# Patient Record
Sex: FEMALE | Race: WHITE | Employment: FULL TIME | URBAN - METROPOLITAN AREA
[De-identification: names, ages, dates, MRNs, and addresses within clinical notes are randomized per-mention and may not be internally consistent; named-entity substitution may affect disease eponyms.]

---

## 2017-04-06 ENCOUNTER — HOSPITAL ENCOUNTER (OUTPATIENT)
Age: 42
Discharge: HOME | End: 2017-04-06
Attending: FAMILY MEDICINE
Payer: COMMERCIAL

## 2017-04-06 VITALS
DIASTOLIC BLOOD PRESSURE: 89 MMHG | RESPIRATION RATE: 20 BRPM | WEIGHT: 170 LBS | BODY MASS INDEX: 26 KG/M2 | OXYGEN SATURATION: 100 % | SYSTOLIC BLOOD PRESSURE: 127 MMHG | HEART RATE: 69 BPM | TEMPERATURE: 98 F

## 2017-04-06 DIAGNOSIS — H57.89 IRRITATION OF LEFT EYE: Primary | ICD-10-CM

## 2017-04-06 PROCEDURE — 99213 OFFICE O/P EST LOW 20 MIN: CPT

## 2017-04-06 PROCEDURE — 99204 OFFICE O/P NEW MOD 45 MIN: CPT

## 2017-04-06 RX ORDER — TOBRAMYCIN 3 MG/ML
1 SOLUTION/ DROPS OPHTHALMIC EVERY 6 HOURS
Qty: 1 BOTTLE | Refills: 0 | Status: SHIPPED | OUTPATIENT
Start: 2017-04-06 | End: 2017-04-13

## 2017-04-07 ENCOUNTER — TELEPHONE (OUTPATIENT)
Dept: INTERNAL MEDICINE CLINIC | Facility: CLINIC | Age: 42
End: 2017-04-07

## 2017-04-07 DIAGNOSIS — H57.89 IRRITATION OF LEFT EYE: Primary | ICD-10-CM

## 2017-04-07 NOTE — TELEPHONE ENCOUNTER
LOV 10/12/16 w/ SD  Referral pended for your approval if ok  Please advise. Thank you.      Rose Marie Fuentes [facility] Clinical Staff Cc: [facility] Referral Pool       Phone Number: [phone]                     Patient Name: Nico Garcia   LAYLA

## 2017-04-07 NOTE — ED PROVIDER NOTES
Patient Seen in: [address] [facility] [place]    History   Patient presents with: Eye Visual Problem (opthalmic)    Stated Complaint: FB in L. Eye    HPI    69-year-old female presents to clinic today with chief complaints of left eye irritation.   [place] Josette headache. May repeat in 2 hours as needed to a max of 2 tablets in 24 hours. Levothyroxine Sodium 150 MCG Oral Tab,  Take 1 tablet (150 mcg total) by mouth once daily.    Pimecrolimus (ELIDEL) 1 % External Cream,  Apply to AA of face BID   ibuprofen (MOT atraumatic.   Left  eye exam:   - Orbits and periorbital area normal : Yes  - PERRL+ : Yes  - EOMI+: Yes  - Cornea : clear  - Conjunctiva: palpebral and bulbar conjunctival injection.   - Perilimbal injection: Yes  - Eyelids normal : Yes  - FB : No  - Photo

## 2017-06-23 NOTE — PROGRESS NOTES
Dallas Tapia is a 37year old female. Patient presents with: Anxiety: has been having alot of anxiety and would  like help for it       HPI:   Presents for eval of anxiety. Her father with cancer and is ill. Children are busy with sports.   Zan Russo MG Oral Tab Take 400 mg by mouth every 6 (six) hours as needed for Pain. Disp:  Rfl:    Multiple Vitamins-Minerals (MULTIVITAMIN & MINERAL OR) Take  by mouth daily. Disp:  Rfl:    Aspirin (ASPIR-81) 81 MG Oral Tab EC Take 81 mg by mouth daily.  Disp:  Rfl: depression  (primary encounter diagnosis)  Start wellbutrin xl. She will email in a few weeks with update. If needed, will add low dose sertraline 25mg. Reviewed medication including use, side effects and follow up.   The options of treatment were discus

## 2017-07-03 ENCOUNTER — APPOINTMENT (OUTPATIENT)
Dept: LAB | Age: 42
End: 2017-07-03
Attending: NURSE PRACTITIONER
Payer: COMMERCIAL

## 2017-07-03 ENCOUNTER — NURSE ONLY (OUTPATIENT)
Dept: INTERNAL MEDICINE CLINIC | Facility: CLINIC | Age: 42
End: 2017-07-03

## 2017-07-03 DIAGNOSIS — Z01.84 IMMUNITY STATUS TESTING: ICD-10-CM

## 2017-07-03 DIAGNOSIS — Z01.84 IMMUNITY STATUS TESTING: Primary | ICD-10-CM

## 2017-07-03 LAB
RUBELLA IGG QUANTITATIVE: 173.8 IU/ML
RUBV IGG SER QL: POSITIVE

## 2017-07-03 PROCEDURE — 90471 IMMUNIZATION ADMIN: CPT | Performed by: NURSE PRACTITIONER

## 2017-07-03 PROCEDURE — 86735 MUMPS ANTIBODY: CPT | Performed by: NURSE PRACTITIONER

## 2017-07-03 PROCEDURE — 90715 TDAP VACCINE 7 YRS/> IM: CPT | Performed by: NURSE PRACTITIONER

## 2017-07-03 PROCEDURE — 86762 RUBELLA ANTIBODY: CPT | Performed by: NURSE PRACTITIONER

## 2017-07-03 PROCEDURE — 86765 RUBEOLA ANTIBODY: CPT | Performed by: NURSE PRACTITIONER

## 2017-07-06 LAB
MEV IGG SER IA-ACNC: POSITIVE
MUV IGG SER IA-ACNC: POSITIVE

## 2017-07-31 RX ORDER — SUMATRIPTAN 100 MG/1
TABLET, FILM COATED ORAL
Qty: 10 TABLET | Refills: 3 | Status: SHIPPED | OUTPATIENT
Start: 2017-07-31 | End: 2018-04-02

## 2017-08-23 ENCOUNTER — HOSPITAL ENCOUNTER (OUTPATIENT)
Dept: CT IMAGING | Facility: HOSPITAL | Age: 42
Discharge: HOME | End: 2017-08-23
Attending: INTERNAL MEDICINE

## 2017-08-23 DIAGNOSIS — Z13.6 SCREENING FOR HEART DISEASE: ICD-10-CM

## 2017-10-19 RX ORDER — LEVOTHYROXINE SODIUM 0.15 MG/1
150 TABLET ORAL
Qty: 90 TABLET | Refills: 0 | Status: SHIPPED | OUTPATIENT
Start: 2017-10-19 | End: 2018-01-21

## 2017-11-21 ENCOUNTER — TELEPHONE (OUTPATIENT)
Dept: INTERNAL MEDICINE CLINIC | Facility: CLINIC | Age: 42
End: 2017-11-21

## 2017-11-21 DIAGNOSIS — Z00.00 LABORATORY EXAMINATION ORDERED AS PART OF A COMPLETE PHYSICAL EXAMINATION: Primary | ICD-10-CM

## 2017-11-21 NOTE — TELEPHONE ENCOUNTER
Patient is having a physical.  Please place orders for [facility]. Patient will be fasting.   Future Appointments  Date Time Provider Melissa Ortiz   12/11/2017 9:10 AM Joshua Alaniz MD [address]   12/12/2017 9:15 AM WAN Caldwell [facility] 7

## 2017-12-12 ENCOUNTER — LAB ENCOUNTER (OUTPATIENT)
Dept: LAB | Age: 42
End: 2017-12-12
Attending: NURSE PRACTITIONER
Payer: COMMERCIAL

## 2017-12-12 ENCOUNTER — OFFICE VISIT (OUTPATIENT)
Dept: INTERNAL MEDICINE CLINIC | Facility: CLINIC | Age: 42
End: 2017-12-12

## 2017-12-12 VITALS
DIASTOLIC BLOOD PRESSURE: 62 MMHG | HEIGHT: 68 IN | RESPIRATION RATE: 16 BRPM | SYSTOLIC BLOOD PRESSURE: 110 MMHG | BODY MASS INDEX: 26.22 KG/M2 | WEIGHT: 173 LBS | HEART RATE: 60 BPM | TEMPERATURE: 98 F

## 2017-12-12 DIAGNOSIS — G43.009 MIGRAINE WITHOUT AURA AND WITHOUT STATUS MIGRAINOSUS, NOT INTRACTABLE: ICD-10-CM

## 2017-12-12 DIAGNOSIS — R93.1 ABNORMAL CT SCAN OF HEART: ICD-10-CM

## 2017-12-12 DIAGNOSIS — Z12.31 ENCOUNTER FOR SCREENING MAMMOGRAM FOR MALIGNANT NEOPLASM OF BREAST: ICD-10-CM

## 2017-12-12 DIAGNOSIS — Z00.00 LABORATORY EXAMINATION ORDERED AS PART OF A COMPLETE PHYSICAL EXAMINATION: ICD-10-CM

## 2017-12-12 DIAGNOSIS — Z00.00 ROUTINE GENERAL MEDICAL EXAMINATION AT A HEALTH CARE FACILITY: Primary | ICD-10-CM

## 2017-12-12 DIAGNOSIS — F41.1 ANXIETY STATE: ICD-10-CM

## 2017-12-12 DIAGNOSIS — Z82.49 FAMILY HISTORY OF EARLY CAD: ICD-10-CM

## 2017-12-12 DIAGNOSIS — M54.50 LOW BACK PAIN WITH RADIATION: ICD-10-CM

## 2017-12-12 DIAGNOSIS — E78.5 DYSLIPIDEMIA: Primary | ICD-10-CM

## 2017-12-12 DIAGNOSIS — E03.9 ACQUIRED HYPOTHYROIDISM: ICD-10-CM

## 2017-12-12 PROCEDURE — 84443 ASSAY THYROID STIM HORMONE: CPT

## 2017-12-12 PROCEDURE — 80061 LIPID PANEL: CPT

## 2017-12-12 PROCEDURE — 99396 PREV VISIT EST AGE 40-64: CPT | Performed by: NURSE PRACTITIONER

## 2017-12-12 PROCEDURE — 80053 COMPREHEN METABOLIC PANEL: CPT

## 2017-12-12 PROCEDURE — 85025 COMPLETE CBC W/AUTO DIFF WBC: CPT

## 2017-12-12 RX ORDER — METHYLPREDNISOLONE 4 MG/1
TABLET ORAL
Refills: 0 | COMMUNITY
Start: 2017-12-09 | End: 2019-01-18 | Stop reason: ALTCHOICE

## 2017-12-12 RX ORDER — ROSUVASTATIN CALCIUM 5 MG/1
5 TABLET, COATED ORAL NIGHTLY
Qty: 90 TABLET | Refills: 1 | Status: SHIPPED | OUTPATIENT
Start: 2017-12-12 | End: 2018-06-08

## 2017-12-12 NOTE — PROGRESS NOTES
Melvina Hollins is a 40year old female who presents for a complete physical exam:       Patient complains of:    Back pain  Started last Wednesday. Sudden onset right low back/buttocks pain with radiation to anterior thigh.   Worsening pain prompted WI 100 MG Oral Tab TAKE 1 TAB BY MOUTH AT ONSET OF HEADACHE, REPEAT DOSE IN 2 HRS AS NEEDED TO A MAX OF 2 TABS IN 24HRS Disp: 10 tablet Rfl: 3   BuPROPion HCl ER, XL, 150 MG Oral Tablet 24 Hr Take 1 tablet (150 mg total) by mouth daily.  Disp: 90 tablet Rfl: 1 status: Never Smoker                                                              Smokeless tobacco: Never Used                      Alcohol use: No              Social History: Occ: . : . Children:   Exercise: three times per week.   Diet: watches f suspicious lesions  HEENT: atraumatic, normocephalic,ears and throat are clear  EYES:PERRLA, EOMI, conjunctiva are clear  NECK: supple,no adenopathy,no bruits  CHEST: no chest tenderness  BREAST: no dominant or suspicious mass  LUNGS: clear to auscultation

## 2017-12-13 RX ORDER — BUPROPION HYDROCHLORIDE 150 MG/1
150 TABLET ORAL DAILY
Qty: 90 TABLET | Refills: 1 | Status: SHIPPED | OUTPATIENT
Start: 2017-12-13 | End: 2018-06-08

## 2017-12-13 NOTE — TELEPHONE ENCOUNTER
LOV:12/13/2017   Upcoming Visit: None   Last Labs: 12/13/2017 lipid, cmp, tsh , cbc   Last refill: 6/23/2017 , 90 tabs .  0 refills     Per protocol route to provider

## 2018-01-22 RX ORDER — LEVOTHYROXINE SODIUM 0.15 MG/1
150 TABLET ORAL
Qty: 90 TABLET | Refills: 3 | Status: SHIPPED | OUTPATIENT
Start: 2018-01-22 | End: 2019-01-11

## 2018-04-02 RX ORDER — SUMATRIPTAN 100 MG/1
TABLET, FILM COATED ORAL
Qty: 10 TABLET | Refills: 3 | Status: SHIPPED | OUTPATIENT
Start: 2018-04-02 | End: 2019-02-14

## 2018-04-02 NOTE — TELEPHONE ENCOUNTER
Medication(s) to Refill:   Pending Prescriptions Disp Refills    SUMATRIPTAN SUCCINATE 100 MG Oral Tab [Pharmacy Med Name: SUMATRIPTAN SUCC 100 MG TABLET] 10 tablet 3     Sig: TAKE 1 TAB BY MOUTH AT ONSET OF HEADACHE, REPEAT DOSE IN 2 HRS AS NEEDED TO JOSÉ MIGUEL CHRISTIAN

## 2018-04-09 ENCOUNTER — HOSPITAL ENCOUNTER (OUTPATIENT)
Dept: MAMMOGRAPHY | Age: 43
Discharge: HOME | End: 2018-04-09
Attending: NURSE PRACTITIONER
Payer: COMMERCIAL

## 2018-04-09 DIAGNOSIS — Z12.31 ENCOUNTER FOR SCREENING MAMMOGRAM FOR MALIGNANT NEOPLASM OF BREAST: ICD-10-CM

## 2018-04-09 PROCEDURE — 77067 SCR MAMMO BI INCL CAD: CPT | Performed by: NURSE PRACTITIONER

## 2018-04-10 ENCOUNTER — APPOINTMENT (OUTPATIENT)
Dept: LAB | Age: 43
End: 2018-04-10
Attending: NURSE PRACTITIONER
Payer: COMMERCIAL

## 2018-04-10 DIAGNOSIS — E78.5 DYSLIPIDEMIA: ICD-10-CM

## 2018-04-10 PROCEDURE — 36415 COLL VENOUS BLD VENIPUNCTURE: CPT

## 2018-04-10 PROCEDURE — 80061 LIPID PANEL: CPT

## 2018-04-10 PROCEDURE — 80053 COMPREHEN METABOLIC PANEL: CPT

## 2018-06-08 ENCOUNTER — TELEPHONE (OUTPATIENT)
Dept: INTERNAL MEDICINE CLINIC | Facility: CLINIC | Age: 43
End: 2018-06-08

## 2018-06-08 RX ORDER — BUPROPION HYDROCHLORIDE 150 MG/1
150 TABLET ORAL DAILY
Qty: 90 TABLET | Refills: 0 | Status: SHIPPED | OUTPATIENT
Start: 2018-06-08 | End: 2019-01-18

## 2018-06-08 RX ORDER — ROSUVASTATIN CALCIUM 5 MG/1
5 TABLET, COATED ORAL NIGHTLY
Qty: 90 TABLET | Refills: 0 | Status: SHIPPED | OUTPATIENT
Start: 2018-06-08 | End: 2019-01-18

## 2018-06-08 NOTE — TELEPHONE ENCOUNTER
LMTCB re: info listed below but pt had a CPE 12/12/17. Is she due for something else other than a CPE?

## 2018-06-08 NOTE — TELEPHONE ENCOUNTER
LOV: 12/12/2017  Future Visit: No appointment scheduled   Last Labs: 04/10/2018 COMP, Lipid panel   Last Rx: 12/13/2017 Bupropion HCL, Rosuvastatin     Per protocol: sent to provider

## 2018-07-23 RX ORDER — BUPROPION HYDROCHLORIDE 150 MG/1
150 TABLET ORAL DAILY
Qty: 90 TABLET | Refills: 1 | Status: SHIPPED | OUTPATIENT
Start: 2018-07-23 | End: 2019-01-18

## 2018-07-23 RX ORDER — ROSUVASTATIN CALCIUM 5 MG/1
5 TABLET, COATED ORAL NIGHTLY
Qty: 90 TABLET | Refills: 1 | Status: SHIPPED | OUTPATIENT
Start: 2018-07-23 | End: 2019-01-18

## 2018-07-23 NOTE — TELEPHONE ENCOUNTER
LOV:12/12/17 SD  FOV: none on file   LAST RX: 6/8/18 Bupropion Hcl Er: 150 mg 1 tablet by mouth daily . 90 tabs 0 refills. 6/8/18 Rosuvastatin calcuim 5 mg 1 tab nightly 90 tabs 0 refills.    LAST LABS:4/10/18 cmp,lipid  PER PROTOCOL: to provider

## 2018-09-04 RX ORDER — ROSUVASTATIN CALCIUM 5 MG/1
TABLET, COATED ORAL
Qty: 90 TABLET | Refills: 0 | OUTPATIENT
Start: 2018-09-04

## 2018-09-04 RX ORDER — BUPROPION HYDROCHLORIDE 150 MG/1
TABLET ORAL
Qty: 90 TABLET | Refills: 0 | OUTPATIENT
Start: 2018-09-04

## 2019-01-02 ENCOUNTER — TELEPHONE (OUTPATIENT)
Dept: INTERNAL MEDICINE CLINIC | Facility: CLINIC | Age: 44
End: 2019-01-02

## 2019-01-02 DIAGNOSIS — Z13.0 SCREENING FOR DISORDER OF BLOOD AND BLOOD-FORMING ORGANS: ICD-10-CM

## 2019-01-02 DIAGNOSIS — Z13.220 SCREENING FOR LIPID DISORDERS: ICD-10-CM

## 2019-01-02 DIAGNOSIS — Z13.228 SCREENING FOR METABOLIC DISORDER: ICD-10-CM

## 2019-01-02 DIAGNOSIS — Z13.29 SCREENING FOR THYROID DISORDER: ICD-10-CM

## 2019-01-02 DIAGNOSIS — Z00.00 ROUTINE GENERAL MEDICAL EXAMINATION AT A HEALTH CARE FACILITY: Primary | ICD-10-CM

## 2019-01-02 NOTE — TELEPHONE ENCOUNTER
Future Appointments   Date Time Provider Melissa Perezi   1/18/2019  9:15 AM JÚNIOR Eaton [facility]   6/12/2019  9:00 AM Hong Moseley MD [address]     Orders to edward- Pt aware to fast-no call back required    Pt h

## 2019-01-11 ENCOUNTER — LAB ENCOUNTER (OUTPATIENT)
Dept: LAB | Age: 44
End: 2019-01-11
Attending: NURSE PRACTITIONER
Payer: COMMERCIAL

## 2019-01-11 DIAGNOSIS — Z00.00 ROUTINE GENERAL MEDICAL EXAMINATION AT A HEALTH CARE FACILITY: ICD-10-CM

## 2019-01-11 DIAGNOSIS — Z13.220 SCREENING FOR LIPID DISORDERS: ICD-10-CM

## 2019-01-11 DIAGNOSIS — E03.9 ACQUIRED HYPOTHYROIDISM: Primary | ICD-10-CM

## 2019-01-11 DIAGNOSIS — Z13.0 SCREENING FOR DISORDER OF BLOOD AND BLOOD-FORMING ORGANS: ICD-10-CM

## 2019-01-11 DIAGNOSIS — Z13.29 SCREENING FOR THYROID DISORDER: ICD-10-CM

## 2019-01-11 DIAGNOSIS — Z13.228 SCREENING FOR METABOLIC DISORDER: ICD-10-CM

## 2019-01-11 LAB
ALBUMIN SERPL-MCNC: 3.7 G/DL (ref 3.1–4.5)
ALBUMIN/GLOB SERPL: 1.1 {RATIO} (ref 1–2)
ALP LIVER SERPL-CCNC: 81 U/L (ref 37–98)
ALT SERPL-CCNC: 34 U/L (ref 14–54)
ANION GAP SERPL CALC-SCNC: 3 MMOL/L (ref 0–18)
AST SERPL-CCNC: 23 U/L (ref 15–41)
BASOPHILS # BLD AUTO: 0.05 X10(3) UL (ref 0–0.1)
BASOPHILS NFR BLD AUTO: 0.8 %
BILIRUB SERPL-MCNC: 0.4 MG/DL (ref 0.1–2)
BUN BLD-MCNC: 11 MG/DL (ref 8–20)
BUN/CREAT SERPL: 14.7 (ref 10–20)
CALCIUM BLD-MCNC: 9.2 MG/DL (ref 8.3–10.3)
CHLORIDE SERPL-SCNC: 105 MMOL/L (ref 101–111)
CHOLEST SMN-MCNC: 120 MG/DL (ref ?–200)
CO2 SERPL-SCNC: 30 MMOL/L (ref 22–32)
CREAT BLD-MCNC: 0.75 MG/DL (ref 0.55–1.02)
EOSINOPHIL # BLD AUTO: 0.18 X10(3) UL (ref 0–0.3)
EOSINOPHIL NFR BLD AUTO: 2.9 %
ERYTHROCYTE [DISTWIDTH] IN BLOOD BY AUTOMATED COUNT: 11.6 % (ref 11.5–16)
GLOBULIN PLAS-MCNC: 3.4 G/DL (ref 2.8–4.4)
GLUCOSE BLD-MCNC: 88 MG/DL (ref 70–99)
HCT VFR BLD AUTO: 39.4 % (ref 34–50)
HDLC SERPL-MCNC: 62 MG/DL (ref 40–59)
HGB BLD-MCNC: 13.5 G/DL (ref 12–16)
IMMATURE GRANULOCYTE COUNT: 0.02 X10(3) UL (ref 0–1)
IMMATURE GRANULOCYTE RATIO %: 0.3 %
LDLC SERPL CALC-MCNC: 46 MG/DL (ref ?–100)
LYMPHOCYTES # BLD AUTO: 2.41 X10(3) UL (ref 0.9–4)
LYMPHOCYTES NFR BLD AUTO: 39.4 %
M PROTEIN MFR SERPL ELPH: 7.1 G/DL (ref 6.4–8.2)
MCH RBC QN AUTO: 31.9 PG (ref 27–33.2)
MCHC RBC AUTO-ENTMCNC: 34.3 G/DL (ref 31–37)
MCV RBC AUTO: 93.1 FL (ref 81–100)
MONOCYTES # BLD AUTO: 0.58 X10(3) UL (ref 0.1–1)
MONOCYTES NFR BLD AUTO: 9.5 %
NEUTROPHIL ABS PRELIM: 2.87 X10 (3) UL (ref 1.3–6.7)
NEUTROPHILS # BLD AUTO: 2.87 X10(3) UL (ref 1.3–6.7)
NEUTROPHILS NFR BLD AUTO: 47.1 %
NONHDLC SERPL-MCNC: 58 MG/DL (ref ?–130)
OSMOLALITY SERPL CALC.SUM OF ELEC: 285 MOSM/KG (ref 275–295)
PLATELET # BLD AUTO: 284 10(3)UL (ref 150–450)
POTASSIUM SERPL-SCNC: 4.1 MMOL/L (ref 3.6–5.1)
RBC # BLD AUTO: 4.23 X10(6)UL (ref 3.8–5.1)
RED CELL DISTRIBUTION WIDTH-SD: 39.5 FL (ref 35.1–46.3)
SODIUM SERPL-SCNC: 138 MMOL/L (ref 136–144)
T4 FREE SERPL-MCNC: 1.5 NG/DL (ref 0.9–1.8)
TRIGL SERPL-MCNC: 60 MG/DL (ref 30–149)
TSI SER-ACNC: 0.06 MIU/ML (ref 0.35–5.5)
VLDLC SERPL CALC-MCNC: 12 MG/DL (ref 0–30)
WBC # BLD AUTO: 6.1 X10(3) UL (ref 4–13)

## 2019-01-11 PROCEDURE — 80053 COMPREHEN METABOLIC PANEL: CPT

## 2019-01-11 PROCEDURE — 80061 LIPID PANEL: CPT

## 2019-01-11 PROCEDURE — 84439 ASSAY OF FREE THYROXINE: CPT

## 2019-01-11 PROCEDURE — 36415 COLL VENOUS BLD VENIPUNCTURE: CPT

## 2019-01-11 PROCEDURE — 84443 ASSAY THYROID STIM HORMONE: CPT

## 2019-01-11 PROCEDURE — 85025 COMPLETE CBC W/AUTO DIFF WBC: CPT

## 2019-01-11 RX ORDER — LEVOTHYROXINE SODIUM 137 UG/1
150 TABLET ORAL
Qty: 90 TABLET | Refills: 1 | Status: SHIPPED | OUTPATIENT
Start: 2019-01-11 | End: 2019-01-16

## 2019-01-16 ENCOUNTER — TELEPHONE (OUTPATIENT)
Dept: INTERNAL MEDICINE CLINIC | Facility: CLINIC | Age: 44
End: 2019-01-16

## 2019-01-16 RX ORDER — LEVOTHYROXINE SODIUM 137 UG/1
137 TABLET ORAL
Qty: 90 TABLET | Refills: 1 | Status: SHIPPED | OUTPATIENT
Start: 2019-01-16 | End: 2019-07-13

## 2019-01-16 NOTE — TELEPHONE ENCOUNTER
Calling to get clarification on directions for Levothyroxine Sodium 137 MCG Oral Tab. It says to take 150mcg?  Please advise

## 2019-01-18 ENCOUNTER — OFFICE VISIT (OUTPATIENT)
Dept: INTERNAL MEDICINE CLINIC | Facility: CLINIC | Age: 44
End: 2019-01-18

## 2019-01-18 VITALS
HEART RATE: 72 BPM | SYSTOLIC BLOOD PRESSURE: 114 MMHG | BODY MASS INDEX: 25.61 KG/M2 | DIASTOLIC BLOOD PRESSURE: 70 MMHG | HEIGHT: 68 IN | TEMPERATURE: 98 F | WEIGHT: 169 LBS

## 2019-01-18 DIAGNOSIS — G43.009 MIGRAINE WITHOUT AURA AND WITHOUT STATUS MIGRAINOSUS, NOT INTRACTABLE: ICD-10-CM

## 2019-01-18 DIAGNOSIS — Z11.51 SCREENING FOR HPV (HUMAN PAPILLOMAVIRUS): ICD-10-CM

## 2019-01-18 DIAGNOSIS — Z12.31 ENCOUNTER FOR SCREENING MAMMOGRAM FOR MALIGNANT NEOPLASM OF BREAST: ICD-10-CM

## 2019-01-18 DIAGNOSIS — F41.1 ANXIETY STATE: ICD-10-CM

## 2019-01-18 DIAGNOSIS — E03.9 ACQUIRED HYPOTHYROIDISM: ICD-10-CM

## 2019-01-18 DIAGNOSIS — L70.0 ACNE VULGARIS: ICD-10-CM

## 2019-01-18 DIAGNOSIS — Z00.00 ROUTINE GENERAL MEDICAL EXAMINATION AT A HEALTH CARE FACILITY: Primary | ICD-10-CM

## 2019-01-18 DIAGNOSIS — Z12.4 SCREENING FOR MALIGNANT NEOPLASM OF CERVIX: ICD-10-CM

## 2019-01-18 PROCEDURE — 99396 PREV VISIT EST AGE 40-64: CPT | Performed by: NURSE PRACTITIONER

## 2019-01-18 PROCEDURE — 87624 HPV HI-RISK TYP POOLED RSLT: CPT | Performed by: NURSE PRACTITIONER

## 2019-01-18 PROCEDURE — 88175 CYTOPATH C/V AUTO FLUID REDO: CPT | Performed by: NURSE PRACTITIONER

## 2019-01-18 RX ORDER — ROSUVASTATIN CALCIUM 5 MG/1
5 TABLET, COATED ORAL NIGHTLY
Qty: 90 TABLET | Refills: 3 | Status: SHIPPED | OUTPATIENT
Start: 2019-01-18

## 2019-01-18 RX ORDER — BUPROPION HYDROCHLORIDE 150 MG/1
150 TABLET ORAL DAILY
Qty: 90 TABLET | Refills: 3 | Status: SHIPPED | OUTPATIENT
Start: 2019-01-18 | End: 2020-02-19

## 2019-01-18 NOTE — PROGRESS NOTES
Amber Hernandez is a 44year old female who presents for a complete physical exam:       Patient complains of:     Anxiety   Well controlled with wellbutrin xl 150mg. Hypothyroid  Over replaced with 150mcg   decresaed to 137mcg with labs 3 months. ONSET OF HEADACHE, REPEAT DOSE IN 2 HRS AS NEEDED TO A MAX OF 2 TABS IN 24HRS Disp: 10 tablet Rfl: 3   ibuprofen (MOTRIN) 400 MG Oral Tab Take 400 mg by mouth every 6 (six) hours as needed for Pain.  Disp:  Rfl:    Multiple Vitamins-Minerals (MULTIVITAMIN & Active:  Yes   Breast Cancer Screening:  Mammogram due on 04/09/2019     Bone Density:  na    Osteoperosis Prevention:    Osteoperosis Prevention was discussed. Reviewed Calcium, Vitamin D supplementation and Weight Bearing Exercises.     Patient is perfor facility  (primary encounter diagnosis)  Screening for malignant neoplasm of cervix  Screening for hpv (human papillomavirus)  Encounter for screening mammogram for malignant neoplasm of breast  Acne vulgaris  Per derm  Anxiety state  Well controlled with

## 2019-01-20 LAB — HPV I/H RISK 1 DNA SPEC QL NAA+PROBE: NEGATIVE

## 2019-01-22 LAB — LAST PAP RESULT: NORMAL

## 2019-02-14 RX ORDER — SUMATRIPTAN 100 MG/1
TABLET, FILM COATED ORAL
Qty: 10 TABLET | Refills: 3 | Status: SHIPPED | OUTPATIENT
Start: 2019-02-14 | End: 2019-09-10

## 2019-02-14 NOTE — TELEPHONE ENCOUNTER
LOV:1/18/19 SD  FOV:none on file   LAST RX:4/2/18 100 mg take 1 tab as needed for headaches 10 tabs 3 refills   LAST LABS:1/11/19 t4 free,cbc,cmp,lipids,tsh   PER PROTOCOL: to provider

## 2019-03-03 ENCOUNTER — HOSPITAL ENCOUNTER (OUTPATIENT)
Age: 44
Discharge: HOME | End: 2019-03-03
Attending: EMERGENCY MEDICINE
Payer: COMMERCIAL

## 2019-03-03 VITALS
HEART RATE: 61 BPM | BODY MASS INDEX: 25.01 KG/M2 | RESPIRATION RATE: 16 BRPM | WEIGHT: 165 LBS | SYSTOLIC BLOOD PRESSURE: 128 MMHG | HEIGHT: 68 IN | OXYGEN SATURATION: 98 % | DIASTOLIC BLOOD PRESSURE: 82 MMHG

## 2019-03-03 DIAGNOSIS — J02.0 STREP PHARYNGITIS: Primary | ICD-10-CM

## 2019-03-03 LAB — POCT RAPID STREP: POSITIVE

## 2019-03-03 PROCEDURE — 87430 STREP A AG IA: CPT | Performed by: EMERGENCY MEDICINE

## 2019-03-03 PROCEDURE — 99213 OFFICE O/P EST LOW 20 MIN: CPT

## 2019-03-03 PROCEDURE — 99214 OFFICE O/P EST MOD 30 MIN: CPT

## 2019-03-03 RX ORDER — AMOXICILLIN 500 MG/1
500 TABLET, FILM COATED ORAL 2 TIMES DAILY
Qty: 30 TABLET | Refills: 0 | Status: SHIPPED | OUTPATIENT
Start: 2019-03-03 | End: 2019-03-13

## 2019-03-03 NOTE — ED PROVIDER NOTES
Patient Seen in: [facility]    History   Patient presents with:  Sore Throat    Stated Complaint: possible strep x3 days     HPI    Patient is a 79-year-old female who presents for evaluation of sore throat, fevers and chil Couple small exudates on the left. No asymmetry concerning for abscess. Eyes: Conjunctivae and EOM are normal. Pupils are equal, round, and reactive to light. Neck: Normal range of motion. Neck supple.    Cardiovascular: Normal rate, regular rhythm and

## 2019-03-15 ENCOUNTER — APPOINTMENT (OUTPATIENT)
Dept: LAB | Age: 44
End: 2019-03-15
Attending: NURSE PRACTITIONER
Payer: COMMERCIAL

## 2019-03-15 DIAGNOSIS — E03.9 ACQUIRED HYPOTHYROIDISM: ICD-10-CM

## 2019-03-15 LAB
T4 FREE SERPL-MCNC: 1.3 NG/DL (ref 0.8–1.7)
TSI SER-ACNC: 1.43 MIU/ML (ref 0.36–3.74)

## 2019-03-15 PROCEDURE — 84439 ASSAY OF FREE THYROXINE: CPT

## 2019-03-15 PROCEDURE — 84443 ASSAY THYROID STIM HORMONE: CPT

## 2019-03-15 PROCEDURE — 36415 COLL VENOUS BLD VENIPUNCTURE: CPT

## 2019-06-13 NOTE — PROGRESS NOTES
Elisa De La Cruz is a 44year old female. Patient presents with: Anxiety: LG. Room 11. Talk about anxiety and depression       HPI:   Here to further discuss her anxiety and depression.   Struggling with some life events she just cant seem to \"get over External Lotion Apply topically to face every morning. Disp: 118 mL Rfl: 2   Multiple Vitamins-Minerals (MULTIVITAMIN & MINERAL OR) Take  by mouth daily. Disp:  Rfl:    Aspirin (ASPIR-81) 81 MG Oral Tab EC Take 81 mg by mouth daily.  Disp:  Rfl:    Folic Ac (primary encounter diagnosis)  Cont wellbutrin xl 150mg   Add sertraline 50mg. Start 25mg and increase to 50mg if tolerating. See me in 4-6 weeks   She will see me end of summer if she is doing well due to her young boys out of school.       No orders of

## 2019-07-13 RX ORDER — LEVOTHYROXINE SODIUM 137 UG/1
TABLET ORAL
Qty: 90 TABLET | Refills: 1 | Status: SHIPPED | OUTPATIENT
Start: 2019-07-13 | End: 2020-01-13

## 2019-08-22 ENCOUNTER — HOSPITAL ENCOUNTER (OUTPATIENT)
Dept: MAMMOGRAPHY | Age: 44
Discharge: HOME | End: 2019-08-22
Attending: NURSE PRACTITIONER
Payer: COMMERCIAL

## 2019-08-22 DIAGNOSIS — Z12.31 ENCOUNTER FOR SCREENING MAMMOGRAM FOR MALIGNANT NEOPLASM OF BREAST: ICD-10-CM

## 2019-08-22 PROCEDURE — 77067 SCR MAMMO BI INCL CAD: CPT | Performed by: NURSE PRACTITIONER

## 2019-08-29 ENCOUNTER — OFFICE VISIT (OUTPATIENT)
Dept: INTERNAL MEDICINE CLINIC | Facility: CLINIC | Age: 44
End: 2019-08-29

## 2019-08-29 VITALS
SYSTOLIC BLOOD PRESSURE: 118 MMHG | HEART RATE: 68 BPM | HEIGHT: 68 IN | TEMPERATURE: 98 F | BODY MASS INDEX: 28.19 KG/M2 | DIASTOLIC BLOOD PRESSURE: 76 MMHG | WEIGHT: 186 LBS

## 2019-08-29 DIAGNOSIS — N92.0 MENORRHAGIA WITH REGULAR CYCLE: ICD-10-CM

## 2019-08-29 DIAGNOSIS — N89.8 VAGINAL ODOR: Primary | ICD-10-CM

## 2019-08-29 LAB
APPEARANCE: CLEAR
MULTISTIX LOT#: ABNORMAL NUMERIC
PH, URINE: 6.5 (ref 4.5–8)
SPECIFIC GRAVITY: 1.01 (ref 1–1.03)
URINE-COLOR: YELLOW
UROBILINOGEN,SEMI-QN: 0.2 MG/DL (ref 0–1.9)

## 2019-08-29 PROCEDURE — 99213 OFFICE O/P EST LOW 20 MIN: CPT | Performed by: NURSE PRACTITIONER

## 2019-08-29 PROCEDURE — 87510 GARDNER VAG DNA DIR PROBE: CPT | Performed by: NURSE PRACTITIONER

## 2019-08-29 PROCEDURE — 87480 CANDIDA DNA DIR PROBE: CPT | Performed by: NURSE PRACTITIONER

## 2019-08-29 PROCEDURE — 87660 TRICHOMONAS VAGIN DIR PROBE: CPT | Performed by: NURSE PRACTITIONER

## 2019-08-29 PROCEDURE — 81003 URINALYSIS AUTO W/O SCOPE: CPT | Performed by: NURSE PRACTITIONER

## 2019-08-29 RX ORDER — METRONIDAZOLE 500 MG/1
500 TABLET ORAL 2 TIMES DAILY
Qty: 14 TABLET | Refills: 0 | Status: SHIPPED | OUTPATIENT
Start: 2019-08-29 | End: 2019-10-17

## 2019-08-29 RX ORDER — FLUCONAZOLE 150 MG/1
150 TABLET ORAL ONCE
Qty: 1 TABLET | Refills: 0 | Status: SHIPPED | OUTPATIENT
Start: 2019-08-29 | End: 2019-08-29

## 2019-08-29 NOTE — PROGRESS NOTES
Padilla Mendoza is a 38year old female. Patient presents with:  Vaginal Problem: LG. Room 11. Vaginal odor for 2 weeks. No irritation or discharge      HPI:   Here for c/o vaginal odor  Noted 2 weeks ago. No pain with intercourse.   No risk factors for Vitamins-Minerals (MULTIVITAMIN & MINERAL OR) Take  by mouth daily. Disp:  Rfl:    Aspirin (ASPIR-81) 81 MG Oral Tab EC Take 81 mg by mouth daily. Disp:  Rfl:    Folic Acid 20 MG Oral Cap Take  by mouth daily.  Disp:  Rfl:       Past Medical History:   Diag encounter diagnosis) diflucan x 1 dsoe. Start flagyl bid until vaginitis panel available given holiday weekend. She agrees. No ETOH with flagyl. Call with unresolved symptoms. Menorrhagia with regular cycle  She will monitor.   If persistent with

## 2019-09-11 RX ORDER — SUMATRIPTAN 100 MG/1
TABLET, FILM COATED ORAL
Qty: 10 TABLET | Refills: 3 | Status: SHIPPED | OUTPATIENT
Start: 2019-09-11 | End: 2020-08-17

## 2019-09-11 NOTE — TELEPHONE ENCOUNTER
Last Office Visit: 8-29-19 with SD for vaginal problem   Last Rx Filled: 2-14-19 10 tabs with 3 refills  Last Labs: 3-15-19 tsh/t4. 1-11-19 t4/tsh/cbc/cmp/lipid/tsh  Future Appointment: none    Per protocol to provider

## 2019-10-17 ENCOUNTER — OFFICE VISIT (OUTPATIENT)
Dept: INTERNAL MEDICINE CLINIC | Facility: CLINIC | Age: 44
End: 2019-10-17

## 2019-10-17 VITALS
DIASTOLIC BLOOD PRESSURE: 78 MMHG | RESPIRATION RATE: 16 BRPM | HEART RATE: 70 BPM | SYSTOLIC BLOOD PRESSURE: 122 MMHG | HEIGHT: 68 IN | BODY MASS INDEX: 29.7 KG/M2 | WEIGHT: 196 LBS

## 2019-10-17 DIAGNOSIS — F41.8 ANXIETY WITH DEPRESSION: ICD-10-CM

## 2019-10-17 DIAGNOSIS — Z51.81 ENCOUNTER FOR THERAPEUTIC DRUG MONITORING: Primary | ICD-10-CM

## 2019-10-17 DIAGNOSIS — E03.9 ACQUIRED HYPOTHYROIDISM: ICD-10-CM

## 2019-10-17 DIAGNOSIS — E66.9 OBESITY (BMI 30.0-34.9): ICD-10-CM

## 2019-10-17 PROBLEM — E72.12 MTHFR (METHYLENE THF REDUCTASE) DEFICIENCY AND HOMOCYSTINURIA (HCC): Status: ACTIVE | Noted: 2019-10-17

## 2019-10-17 PROBLEM — E72.11 MTHFR (METHYLENE THF REDUCTASE) DEFICIENCY AND HOMOCYSTINURIA (HCC): Status: ACTIVE | Noted: 2019-10-17

## 2019-10-17 PROCEDURE — 93000 ELECTROCARDIOGRAM COMPLETE: CPT | Performed by: NURSE PRACTITIONER

## 2019-10-17 PROCEDURE — 99215 OFFICE O/P EST HI 40 MIN: CPT | Performed by: NURSE PRACTITIONER

## 2019-10-17 RX ORDER — DIETHYLPROPION HYDROCHLORIDE 75 MG/1
1 TABLET ORAL EVERY MORNING
Qty: 30 TABLET | Refills: 0 | Status: SHIPPED | OUTPATIENT
Start: 2019-10-17 | End: 2019-11-14

## 2019-10-17 RX ORDER — FLUCONAZOLE 150 MG/1
TABLET ORAL
Refills: 0 | COMMUNITY
Start: 2019-08-29 | End: 2019-10-17

## 2019-10-17 NOTE — PATIENT INSTRUCTIONS
PLAN:  Continue with medications: diethyloprion 75mg   Go to the lab for blood work  Follow up with me in 1 month  Schedule follow up appointments:  Dietitian/nutritionist, behavioral psych      Please try to work on the following dietary changes:  1.  [place]iffin sleep, and raul to your daily life.

## 2019-10-17 NOTE — PROGRESS NOTES
HISTORY OF PRESENT ILLNESS  Patient presents with:  Weight Problem: referred by friends, tried phentermine    Chester Allred is a 38year old female new to our office today for initiation of medical weight loss program.  Patient presents today with c/o per/month  Supplements: none  Stress level: 7-8/10  Sleep hours and integrity: 6-7  Hours per night    MEDICAL HISTORY  PMH reviewed:   Cardiac disorders:negative   Diabetes: negative  Thyroid disease: hypothyroid  Renal disease: negative   Kidney stones: 12/12/2017    GFRNAA 99 01/11/2019    GFRAA 114 01/11/2019    CA 9.2 01/11/2019    OSMOCALC 285 01/11/2019    ALKPHO 81 01/11/2019    AST 23 01/11/2019    ALT 34 01/11/2019    BILT 0.4 01/11/2019    TP 7.1 01/11/2019    ALB 3.7 01/11/2019    GLOBULT 3.0 01 by mouth daily. , Disp: , Rfl:   Folic Acid 20 MG Oral Cap, Take  by mouth daily. , Disp: , Rfl:     No current facility-administered medications on file prior to visit.        ASSESSMENT/PLAN  (Z51.81) Encounter for therapeutic drug monitoring  (primary enco obesity and healthy diet and exercise. Discussed:  · Counseled on comprehensive weight loss plan including attention to nutrition, exercise and behavior/stress management for success. See patient instruction below for more details.   · Schedule appointme option is Lose it)) to help you to monitor daily dietary intake and you will be able to see if you are eating the right amount of calories, protein, carbs                With My Fitness Pal-->When you set-up the razia or need to adjust settings:

## 2019-10-18 ENCOUNTER — LAB ENCOUNTER (OUTPATIENT)
Dept: LAB | Age: 44
End: 2019-10-18
Attending: NURSE PRACTITIONER
Payer: COMMERCIAL

## 2019-10-18 DIAGNOSIS — E66.9 OBESITY (BMI 30.0-34.9): ICD-10-CM

## 2019-10-18 DIAGNOSIS — Z51.81 ENCOUNTER FOR THERAPEUTIC DRUG MONITORING: ICD-10-CM

## 2019-10-18 PROCEDURE — 83036 HEMOGLOBIN GLYCOSYLATED A1C: CPT

## 2019-10-18 PROCEDURE — 82306 VITAMIN D 25 HYDROXY: CPT

## 2019-10-18 PROCEDURE — 82607 VITAMIN B-12: CPT

## 2019-10-18 PROCEDURE — 36415 COLL VENOUS BLD VENIPUNCTURE: CPT

## 2019-10-18 NOTE — PROGRESS NOTES
a1c normal  Borderline low vitamin D level, start daily maintenance vitamin D 2000 Units  Low Vitamin b level,  would recommend vit b injections monthly X 6 months or nascobal

## 2019-10-22 ENCOUNTER — TELEPHONE (OUTPATIENT)
Dept: INTERNAL MEDICINE CLINIC | Facility: CLINIC | Age: 44
End: 2019-10-22

## 2019-11-14 ENCOUNTER — OFFICE VISIT (OUTPATIENT)
Dept: INTERNAL MEDICINE CLINIC | Facility: CLINIC | Age: 44
End: 2019-11-14

## 2019-11-14 VITALS
HEART RATE: 74 BPM | HEIGHT: 68 IN | BODY MASS INDEX: 28.49 KG/M2 | SYSTOLIC BLOOD PRESSURE: 114 MMHG | DIASTOLIC BLOOD PRESSURE: 76 MMHG | WEIGHT: 188 LBS | RESPIRATION RATE: 16 BRPM

## 2019-11-14 DIAGNOSIS — F41.8 ANXIETY WITH DEPRESSION: ICD-10-CM

## 2019-11-14 DIAGNOSIS — Z51.81 ENCOUNTER FOR THERAPEUTIC DRUG MONITORING: Primary | ICD-10-CM

## 2019-11-14 DIAGNOSIS — E03.9 ACQUIRED HYPOTHYROIDISM: ICD-10-CM

## 2019-11-14 DIAGNOSIS — E66.9 OBESITY (BMI 30.0-34.9): ICD-10-CM

## 2019-11-14 DIAGNOSIS — E53.8 B12 DEFICIENCY: ICD-10-CM

## 2019-11-14 PROCEDURE — 99214 OFFICE O/P EST MOD 30 MIN: CPT | Performed by: NURSE PRACTITIONER

## 2019-11-14 PROCEDURE — 96372 THER/PROPH/DIAG INJ SC/IM: CPT | Performed by: NURSE PRACTITIONER

## 2019-11-14 RX ORDER — CYANOCOBALAMIN 1000 UG/ML
1000 INJECTION INTRAMUSCULAR; SUBCUTANEOUS ONCE
Status: COMPLETED | OUTPATIENT
Start: 2019-11-14 | End: 2019-11-14

## 2019-11-14 RX ORDER — DIETHYLPROPION HYDROCHLORIDE 75 MG/1
1 TABLET ORAL EVERY MORNING
Qty: 30 TABLET | Refills: 0 | Status: SHIPPED | OUTPATIENT
Start: 2019-11-14 | End: 2019-12-11

## 2019-11-14 RX ADMIN — CYANOCOBALAMIN 1000 MCG: 1000 INJECTION INTRAMUSCULAR; SUBCUTANEOUS at 10:36:00

## 2019-11-14 NOTE — PROGRESS NOTES
HISTORY OF PRESENT ILLNESS  Patient presents with:  Weight Check: down 8 lbs    Sharla Rosario is a 38year old female here for follow up with medical weight loss program for the treatment of overweight, obesity, or morbid obesity.  Patient has lost -#   Support: yes  Tobacco use: none  ETOH use: 1  per/month  Supplements: none  Stress level: 7-8/10  Sleep hours and integrity: 6-7  Hours per night    MEDICAL HISTORY  PMH reviewed:   Cardiac disorders:negative   Diabetes: negative  Thyroid disease: 01/11/2019     Lab Results   Component Value Date    EAG 91 10/18/2019    A1C 4.8 10/18/2019     Lab Results   Component Value Date    CHOLEST 120 01/11/2019    TRIG 60 01/11/2019    HDL 62 (H) 01/11/2019    LDL 46 01/11/2019    VLDL 12 01/11/2019    TCHDL visit.       ASSESSMENT/PLAN  (Z51.81) Encounter for therapeutic drug monitoring  (primary encounter diagnosis)  Plan: Diethylpropion HCl ER 75 MG Oral Tablet 24 Hr    (E66.9) Obesity (BMI 30.0-34. 9)  Plan: Diethylpropion HCl ER 75 MG Oral Tablet 24 Hr food log  -drink a lot of water 65 oz of water per day  - Do not drink your calories (no soda, juice, high calorie coffee drinks, limit alcohol)  - Do not eat late at night  · FITTE: ACSM recommendations (150-300 minutes/ week in active weight loss)   · Madison Hines

## 2019-11-14 NOTE — PATIENT INSTRUCTIONS
Keep up the great work! !     PLAN:  Continue with medications:  diethylproion 75mg  Vitamin b12 injection #1  Follow up with me in 1 month  Schedule follow up appointments:  Dietitian/nutritionist      Please try to work on the following dietary changes: clarity, sleep, and raul to your daily life.

## 2019-11-20 ENCOUNTER — OFFICE VISIT (OUTPATIENT)
Dept: INTERNAL MEDICINE CLINIC | Facility: CLINIC | Age: 44
End: 2019-11-20

## 2019-11-20 VITALS
BODY MASS INDEX: 28.34 KG/M2 | OXYGEN SATURATION: 98 % | DIASTOLIC BLOOD PRESSURE: 78 MMHG | WEIGHT: 187 LBS | SYSTOLIC BLOOD PRESSURE: 136 MMHG | HEART RATE: 66 BPM | HEIGHT: 68 IN | RESPIRATION RATE: 18 BRPM | TEMPERATURE: 98 F

## 2019-11-20 DIAGNOSIS — N89.8 VAGINAL ODOR: Primary | ICD-10-CM

## 2019-11-20 PROCEDURE — 99213 OFFICE O/P EST LOW 20 MIN: CPT | Performed by: NURSE PRACTITIONER

## 2019-11-20 PROCEDURE — 87480 CANDIDA DNA DIR PROBE: CPT | Performed by: NURSE PRACTITIONER

## 2019-11-20 PROCEDURE — 87510 GARDNER VAG DNA DIR PROBE: CPT | Performed by: NURSE PRACTITIONER

## 2019-11-20 PROCEDURE — 87660 TRICHOMONAS VAGIN DIR PROBE: CPT | Performed by: NURSE PRACTITIONER

## 2019-11-20 NOTE — PROGRESS NOTES
Mirella Yates is a 38year old female. Patient presents with:  Vaginal Discharge: RG rm 10 BV ongoing issues. HPI:   Presents for eval of BV. Treated August, 2019. Flagyl bid x 7 days. Got a bit better, cycled, increased odor.   [address] [place] [address] Oral Cap Take  by mouth daily.         Past Medical History:   Diagnosis Date   • Acne    • Anxiety state, unspecified    • Hypothyroid    • Migraine    • MTHFR (methylene THF reductase) deficiency and homocystinuria (HCC)    • OTHER DISEASES     mthfr clot Orders Placed This Encounter      Vaginitis/Vaginosis, Dna Probe      Meds & Refills for this Visit:  Requested Prescriptions      No prescriptions requested or ordered in this encounter       Imaging & Consults:  None    No follow-ups on file.   There

## 2019-11-21 RX ORDER — METRONIDAZOLE 500 MG/1
500 TABLET ORAL 2 TIMES DAILY
Qty: 28 TABLET | Refills: 0 | Status: SHIPPED | OUTPATIENT
Start: 2019-11-21 | End: 2019-12-05

## 2019-12-10 ENCOUNTER — OFFICE VISIT (OUTPATIENT)
Dept: INTERNAL MEDICINE CLINIC | Facility: CLINIC | Age: 44
End: 2019-12-10

## 2019-12-10 VITALS — BODY MASS INDEX: 28.4 KG/M2 | WEIGHT: 187.38 LBS | HEIGHT: 68 IN

## 2019-12-10 DIAGNOSIS — E66.3 OVERWEIGHT (BMI 25.0-29.9): Primary | ICD-10-CM

## 2019-12-10 PROCEDURE — 97802 MEDICAL NUTRITION INDIV IN: CPT | Performed by: DIETITIAN, REGISTERED

## 2019-12-10 NOTE — PROGRESS NOTES
INITIAL OUTPATIENT NUTRITION CONSULTATION    Nutrition Assessment    Medical Diagnosis: Overweight    Physical Findings: None reported    Client Age and Gender: 38year old female    Marital Status and Occupation: , works as an RN at [employer] Reference interval for fasting triglycerides  Desirable: <150 mg/dL  Borderline: 150-199 mg/dL  High: 200-499 mg/dL  Very High: >=500 mg/dL           LDL Cholesterol   Date Value Ref Range Status   01/11/2019 46 <100 mg/dL Final     Comment:       Leigha Watkins or piece of candy  Beverages: 40 oz per day, 2 coke zeros per day    Meal pattern: 3 meals/d, 1 snacks/d    Number of meals/week eaten at restaurants: 1-2x        Alcohol Intake: 0 ozs/wk    Diet Quality: Poor-moderate    Estimated current caloric intake: CRISTAL, THIERRY

## 2019-12-10 NOTE — PROGRESS NOTES
HISTORY OF PRESENT ILLNESS  Patient presents with:  Weight Check: lost 2 pounds    Elvin Victor is a 38year old female here for follow up with medical weight loss program for the treatment of overweight, obesity, or morbid obesity.  Patient has lost salad 2 cookies Water: adequate   Soda: 2 coke zero  Juice:  Coffee/Tea: 2 coffee     Sweet/ Salty tooth:  Portion: medium  Eats 3 meals per day: yes   Number of restaurant or fast food meals/week: 3 meals/week    Social hx and lifestyle reviewed:    Work: 01/11/2019    OSMOCALC 285 01/11/2019    ALKPHO 81 01/11/2019    AST 23 01/11/2019    ALT 34 01/11/2019    BILT 0.4 01/11/2019    TP 7.1 01/11/2019    ALB 3.7 01/11/2019    GLOBULT 3.0 01/12/2016    GLOBULIN 3.4 01/11/2019    ALBGLOBRAT 1.5 01/12/2016    N Rfl:   Aspirin (ASPIR-81) 81 MG Oral Tab EC, Take 81 mg by mouth daily. , Disp: , Rfl:   Folic Acid 20 MG Oral Cap, Take  by mouth daily. , Disp: , Rfl:     No current facility-administered medications on file prior to visit.        ASSESSMENT/PLAN  (Z51.81) weight loss plan including attention to nutrition, exercise and behavior/stress management for success. See patient instruction below for more details.   · Schedule appointment with dietitian  -low carb high protein  -portion control  -Limit carbohydrates per day)  6. Get a good night of sleep  7. Try to decrease stress in life     Please download apps:  1.  \"My Fitness Pal\" (other option is Lose it)) to help you to monitor daily dietary intake and you will be able to see if you are eating the right amount

## 2019-12-11 ENCOUNTER — OFFICE VISIT (OUTPATIENT)
Dept: INTERNAL MEDICINE CLINIC | Facility: CLINIC | Age: 44
End: 2019-12-11

## 2019-12-11 VITALS
BODY MASS INDEX: 28.19 KG/M2 | DIASTOLIC BLOOD PRESSURE: 78 MMHG | HEIGHT: 68 IN | WEIGHT: 186 LBS | SYSTOLIC BLOOD PRESSURE: 138 MMHG | HEART RATE: 70 BPM | RESPIRATION RATE: 16 BRPM

## 2019-12-11 DIAGNOSIS — E03.9 ACQUIRED HYPOTHYROIDISM: ICD-10-CM

## 2019-12-11 DIAGNOSIS — E53.8 B12 DEFICIENCY: ICD-10-CM

## 2019-12-11 DIAGNOSIS — E66.9 OBESITY (BMI 30.0-34.9): ICD-10-CM

## 2019-12-11 DIAGNOSIS — F41.1 ANXIETY STATE: ICD-10-CM

## 2019-12-11 DIAGNOSIS — Z51.81 ENCOUNTER FOR THERAPEUTIC DRUG MONITORING: Primary | ICD-10-CM

## 2019-12-11 PROCEDURE — 99214 OFFICE O/P EST MOD 30 MIN: CPT | Performed by: NURSE PRACTITIONER

## 2019-12-11 PROCEDURE — 96372 THER/PROPH/DIAG INJ SC/IM: CPT | Performed by: NURSE PRACTITIONER

## 2019-12-11 RX ORDER — TOPIRAMATE 25 MG/1
25 TABLET ORAL 2 TIMES DAILY
Qty: 60 TABLET | Refills: 1 | Status: SHIPPED | OUTPATIENT
Start: 2019-12-11 | End: 2020-01-09

## 2019-12-11 RX ORDER — DIETHYLPROPION HYDROCHLORIDE 75 MG/1
1 TABLET ORAL EVERY MORNING
Qty: 30 TABLET | Refills: 0 | Status: SHIPPED | OUTPATIENT
Start: 2019-12-11 | End: 2020-01-09

## 2019-12-11 RX ORDER — CYANOCOBALAMIN 1000 UG/ML
1000 INJECTION INTRAMUSCULAR; SUBCUTANEOUS ONCE
Status: COMPLETED | OUTPATIENT
Start: 2019-12-11 | End: 2019-12-11

## 2019-12-11 RX ADMIN — CYANOCOBALAMIN 1000 MCG: 1000 INJECTION INTRAMUSCULAR; SUBCUTANEOUS at 11:56:00

## 2019-12-11 NOTE — PATIENT INSTRUCTIONS
Keep up the great work! !     PLAN:  Continue with medications: diethylproion 75mg, Topamax: take 1 tablet before dinner for the first week (to decrease side effects) and than the second week--> increase to 1 tablet in the morning and 1 tablet before dinner minute workout\" to help with exercise/activity which takes 7 minutes of your day and that you can do at home! 3. \"Calm\" or \"Headspace\" which helps with mindfulness, meditation, clarity, sleep, and raul to your daily life.

## 2019-12-13 NOTE — TELEPHONE ENCOUNTER
BDB:[id number] SD  FOV:none on file   LAST RX:6/13/19 50 mg take 1 tab daily 90 tabs 1 refill   LAST LABS: 11/20/19 vaginitis panel  PER PROTOCOL: to provider

## 2019-12-19 NOTE — TELEPHONE ENCOUNTER
Last Ov: 11/20/19, SD, acute  Upcoming appt: no upcoming appt  Last labs: Vaginitis/vaginosis DNA 11/20/19  Last Rx: sertraline 50mg, #90, 0R 12/13/19    Per Protocol, not on protocol. Rx pending.

## 2019-12-31 ENCOUNTER — OFFICE VISIT (OUTPATIENT)
Dept: OBGYN CLINIC | Facility: CLINIC | Age: 44
End: 2019-12-31

## 2019-12-31 VITALS
DIASTOLIC BLOOD PRESSURE: 62 MMHG | WEIGHT: 186 LBS | SYSTOLIC BLOOD PRESSURE: 119 MMHG | HEART RATE: 74 BPM | BODY MASS INDEX: 28.19 KG/M2 | HEIGHT: 68 IN

## 2019-12-31 DIAGNOSIS — Z12.31 ENCOUNTER FOR SCREENING MAMMOGRAM FOR MALIGNANT NEOPLASM OF BREAST: Primary | ICD-10-CM

## 2019-12-31 DIAGNOSIS — N76.0 VAGINITIS AND VULVOVAGINITIS: ICD-10-CM

## 2019-12-31 DIAGNOSIS — Z01.419 WELL WOMAN EXAM WITH ROUTINE GYNECOLOGICAL EXAM: ICD-10-CM

## 2019-12-31 PROCEDURE — 99396 PREV VISIT EST AGE 40-64: CPT | Performed by: OBSTETRICS & GYNECOLOGY

## 2019-12-31 PROCEDURE — 87480 CANDIDA DNA DIR PROBE: CPT | Performed by: OBSTETRICS & GYNECOLOGY

## 2019-12-31 PROCEDURE — 87510 GARDNER VAG DNA DIR PROBE: CPT | Performed by: OBSTETRICS & GYNECOLOGY

## 2019-12-31 PROCEDURE — 87660 TRICHOMONAS VAGIN DIR PROBE: CPT | Performed by: OBSTETRICS & GYNECOLOGY

## 2019-12-31 NOTE — PROGRESS NOTES
Olga Azul is a 38year old female [id number] Patient's last menstrual period was 12/26/2019 (exact date). Patient presents with:  Gyn Problem: flagyl and vaginal suppositories have not helped with vaginal odor  .      Periods are getting closer no hot level: Not on file    Occupational History      Not on file    Social Needs      Financial resource strain: Not on file      Food insecurity:        Worry: Not on file        Inability: Not on file      Transportation needs:        Medical: Not on file Disorder Father    • Prostate Cancer Father 66   • Cancer Father 66        LUNG   • Other (CAD) Father    • Cancer Paternal Grandfather         liver dx age 42-54's   • Breast Cancer Paternal Aunt 61        post menopause 64-70 yrs old   • Hypertension Mot EC, Take 81 mg by mouth daily. , Disp: , Rfl:   •  Folic Acid 20 MG Oral Cap, Take  by mouth daily. , Disp: , Rfl:     ALLERGIES:  No Known Allergies      Review of Systems:  Constitutional:  Denies fatigue, night sweats, hot flashes  Gastrointestinal:  dirk

## 2020-01-07 DIAGNOSIS — Z51.81 ENCOUNTER FOR THERAPEUTIC DRUG MONITORING: ICD-10-CM

## 2020-01-07 DIAGNOSIS — E66.9 OBESITY (BMI 30.0-34.9): ICD-10-CM

## 2020-01-08 RX ORDER — TOPIRAMATE 25 MG/1
TABLET ORAL
Qty: 60 TABLET | Refills: 1 | OUTPATIENT
Start: 2020-01-08

## 2020-01-08 NOTE — TELEPHONE ENCOUNTER
Requesting Topiramate 25 mg  LOV: 12/11/19  RTC: one month  Last Relevant Labs: na  Filled: 12/11/19 #60 with 1 refills    Future Appointments   Date Time Provider Melissa Ortiz   1/9/2020  1:20 PM JÚNIOR Almonte [facility]   2/3/202

## 2020-01-09 ENCOUNTER — OFFICE VISIT (OUTPATIENT)
Dept: INTERNAL MEDICINE CLINIC | Facility: CLINIC | Age: 45
End: 2020-01-09

## 2020-01-09 VITALS
SYSTOLIC BLOOD PRESSURE: 110 MMHG | RESPIRATION RATE: 16 BRPM | WEIGHT: 187 LBS | HEART RATE: 68 BPM | DIASTOLIC BLOOD PRESSURE: 70 MMHG | BODY MASS INDEX: 28.34 KG/M2 | HEIGHT: 68 IN

## 2020-01-09 DIAGNOSIS — E03.9 ACQUIRED HYPOTHYROIDISM: ICD-10-CM

## 2020-01-09 DIAGNOSIS — E53.8 B12 DEFICIENCY: ICD-10-CM

## 2020-01-09 DIAGNOSIS — E66.9 OBESITY (BMI 30.0-34.9): ICD-10-CM

## 2020-01-09 DIAGNOSIS — Z51.81 ENCOUNTER FOR THERAPEUTIC DRUG MONITORING: Primary | ICD-10-CM

## 2020-01-09 PROCEDURE — 99214 OFFICE O/P EST MOD 30 MIN: CPT | Performed by: NURSE PRACTITIONER

## 2020-01-09 PROCEDURE — 96372 THER/PROPH/DIAG INJ SC/IM: CPT | Performed by: NURSE PRACTITIONER

## 2020-01-09 RX ORDER — DIETHYLPROPION HYDROCHLORIDE 75 MG/1
1 TABLET ORAL EVERY MORNING
Qty: 30 TABLET | Refills: 0 | Status: CANCELLED | OUTPATIENT
Start: 2020-01-09 | End: 2020-02-08

## 2020-01-09 RX ORDER — TOPIRAMATE 25 MG/1
25 TABLET ORAL 2 TIMES DAILY
Qty: 60 TABLET | Refills: 1 | Status: CANCELLED | OUTPATIENT
Start: 2020-01-09 | End: 2020-02-08

## 2020-01-09 RX ORDER — CYANOCOBALAMIN 1000 UG/ML
1000 INJECTION INTRAMUSCULAR; SUBCUTANEOUS ONCE
Status: COMPLETED | OUTPATIENT
Start: 2020-01-09 | End: 2020-01-09

## 2020-01-09 RX ORDER — TOPIRAMATE 50 MG/1
50 TABLET, FILM COATED ORAL 2 TIMES DAILY
Qty: 60 TABLET | Refills: 1 | Status: SHIPPED | OUTPATIENT
Start: 2020-01-09 | End: 2020-03-02

## 2020-01-09 RX ORDER — PHENTERMINE HYDROCHLORIDE 37.5 MG/1
37.5 TABLET ORAL
Qty: 30 TABLET | Refills: 0 | Status: SHIPPED | OUTPATIENT
Start: 2020-01-09 | End: 2020-02-03

## 2020-01-09 RX ADMIN — CYANOCOBALAMIN 1000 MCG: 1000 INJECTION INTRAMUSCULAR; SUBCUTANEOUS at 13:50:00

## 2020-01-09 NOTE — PATIENT INSTRUCTIONS
PLAN:  Continue with medications: phentermine 37.5mg, topamax 50mg,   Vitamin b12 #3  Follow up with me in 1 month  Schedule follow up appointments:  Dietitian/nutritionist      Please try to work on the following dietary changes:  1.  Drink lots of wa to your daily life.

## 2020-01-09 NOTE — PROGRESS NOTES
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 , Disp: , Rfl:   Folic Acid 20 MG Oral Cap, Take  by mouth daily. , Disp: , Rfl:     No current facility-administered medications on file prior to visit.        ASSESSMENT/PLAN  (Z51.81) Encounter for t taken phentermine in the past    Discussed:  · Counseled on comprehensive weight loss plan including attention to nutrition, exercise and behavior/stress management for success. See patient instruction below for more details.   · Schedule appointment with rasheed

## 2020-01-13 ENCOUNTER — TELEPHONE (OUTPATIENT)
Dept: INTERNAL MEDICINE CLINIC | Facility: CLINIC | Age: 45
End: 2020-01-13

## 2020-01-13 DIAGNOSIS — Z13.220 SCREENING FOR LIPID DISORDERS: ICD-10-CM

## 2020-01-13 DIAGNOSIS — Z13.29 SCREENING FOR THYROID DISORDER: ICD-10-CM

## 2020-01-13 DIAGNOSIS — Z00.00 ROUTINE GENERAL MEDICAL EXAMINATION AT A HEALTH CARE FACILITY: Primary | ICD-10-CM

## 2020-01-13 DIAGNOSIS — Z13.0 SCREENING FOR DISORDER OF BLOOD AND BLOOD-FORMING ORGANS: ICD-10-CM

## 2020-01-13 DIAGNOSIS — Z13.228 SCREENING FOR METABOLIC DISORDER: ICD-10-CM

## 2020-01-13 RX ORDER — LEVOTHYROXINE SODIUM 137 UG/1
TABLET ORAL
Qty: 90 TABLET | Refills: 0 | Status: SHIPPED | OUTPATIENT
Start: 2020-01-13 | End: 2020-01-29

## 2020-01-13 NOTE — TELEPHONE ENCOUNTER
Future Appointments   Date Time Provider Melissa Diana   1/29/2020  9:00 AM Ailin Montemayor, APRN [facility]     Orders to edward- Pt informed that labs need to be completed no sooner than 2 weeks prior to the appt.  Pt aware to fast-no call twin

## 2020-01-27 ENCOUNTER — LAB ENCOUNTER (OUTPATIENT)
Dept: LAB | Age: 45
End: 2020-01-27
Attending: INTERNAL MEDICINE
Payer: COMMERCIAL

## 2020-01-27 DIAGNOSIS — Z00.00 ROUTINE GENERAL MEDICAL EXAMINATION AT A HEALTH CARE FACILITY: ICD-10-CM

## 2020-01-27 DIAGNOSIS — Z13.0 SCREENING FOR DISORDER OF BLOOD AND BLOOD-FORMING ORGANS: ICD-10-CM

## 2020-01-27 DIAGNOSIS — Z13.228 SCREENING FOR METABOLIC DISORDER: ICD-10-CM

## 2020-01-27 DIAGNOSIS — Z13.29 SCREENING FOR THYROID DISORDER: ICD-10-CM

## 2020-01-27 DIAGNOSIS — Z13.220 SCREENING FOR LIPID DISORDERS: ICD-10-CM

## 2020-01-27 LAB
ALBUMIN SERPL-MCNC: 4 G/DL (ref 3.4–5)
ALBUMIN/GLOB SERPL: 1 {RATIO} (ref 1–2)
ALP LIVER SERPL-CCNC: 69 U/L (ref 37–98)
ALT SERPL-CCNC: 34 U/L (ref 13–56)
ANION GAP SERPL CALC-SCNC: 3 MMOL/L (ref 0–18)
AST SERPL-CCNC: 19 U/L (ref 15–37)
BASOPHILS # BLD AUTO: 0.07 X10(3) UL (ref 0–0.2)
BASOPHILS NFR BLD AUTO: 1.1 %
BILIRUB SERPL-MCNC: 0.6 MG/DL (ref 0.1–2)
BUN BLD-MCNC: 13 MG/DL (ref 7–18)
BUN/CREAT SERPL: 12.3 (ref 10–20)
CALCIUM BLD-MCNC: 10 MG/DL (ref 8.5–10.1)
CHLORIDE SERPL-SCNC: 108 MMOL/L (ref 98–112)
CHOLEST SMN-MCNC: 143 MG/DL (ref ?–200)
CO2 SERPL-SCNC: 28 MMOL/L (ref 21–32)
CREAT BLD-MCNC: 1.06 MG/DL (ref 0.55–1.02)
DEPRECATED RDW RBC AUTO: 41.6 FL (ref 35.1–46.3)
EOSINOPHIL # BLD AUTO: 0.16 X10(3) UL (ref 0–0.7)
EOSINOPHIL NFR BLD AUTO: 2.5 %
ERYTHROCYTE [DISTWIDTH] IN BLOOD BY AUTOMATED COUNT: 12.2 % (ref 11–15)
GLOBULIN PLAS-MCNC: 4 G/DL (ref 2.8–4.4)
GLUCOSE BLD-MCNC: 100 MG/DL (ref 70–99)
HCT VFR BLD AUTO: 43.1 % (ref 35–48)
HDLC SERPL-MCNC: 61 MG/DL (ref 40–59)
HGB BLD-MCNC: 14.7 G/DL (ref 12–16)
IMM GRANULOCYTES # BLD AUTO: 0.01 X10(3) UL (ref 0–1)
IMM GRANULOCYTES NFR BLD: 0.2 %
LDLC SERPL CALC-MCNC: 63 MG/DL (ref ?–100)
LYMPHOCYTES # BLD AUTO: 2.26 X10(3) UL (ref 1–4)
LYMPHOCYTES NFR BLD AUTO: 35.7 %
M PROTEIN MFR SERPL ELPH: 8 G/DL (ref 6.4–8.2)
MCH RBC QN AUTO: 31.9 PG (ref 26–34)
MCHC RBC AUTO-ENTMCNC: 34.1 G/DL (ref 31–37)
MCV RBC AUTO: 93.5 FL (ref 80–100)
MONOCYTES # BLD AUTO: 0.65 X10(3) UL (ref 0.1–1)
MONOCYTES NFR BLD AUTO: 10.3 %
NEUTROPHILS # BLD AUTO: 3.18 X10 (3) UL (ref 1.5–7.7)
NEUTROPHILS # BLD AUTO: 3.18 X10(3) UL (ref 1.5–7.7)
NEUTROPHILS NFR BLD AUTO: 50.2 %
NONHDLC SERPL-MCNC: 82 MG/DL (ref ?–130)
OSMOLALITY SERPL CALC.SUM OF ELEC: 288 MOSM/KG (ref 275–295)
PATIENT FASTING Y/N/NP: YES
PATIENT FASTING Y/N/NP: YES
PLATELET # BLD AUTO: 315 10(3)UL (ref 150–450)
POTASSIUM SERPL-SCNC: 4.2 MMOL/L (ref 3.5–5.1)
RBC # BLD AUTO: 4.61 X10(6)UL (ref 3.8–5.3)
SODIUM SERPL-SCNC: 139 MMOL/L (ref 136–145)
T4 FREE SERPL-MCNC: 1.3 NG/DL (ref 0.8–1.7)
TRIGL SERPL-MCNC: 93 MG/DL (ref 30–149)
TSI SER-ACNC: 4.2 MIU/ML (ref 0.36–3.74)
VLDLC SERPL CALC-MCNC: 19 MG/DL (ref 0–30)
WBC # BLD AUTO: 6.3 X10(3) UL (ref 4–11)

## 2020-01-27 PROCEDURE — 80053 COMPREHEN METABOLIC PANEL: CPT

## 2020-01-27 PROCEDURE — 80061 LIPID PANEL: CPT

## 2020-01-27 PROCEDURE — 84443 ASSAY THYROID STIM HORMONE: CPT

## 2020-01-27 PROCEDURE — 84439 ASSAY OF FREE THYROXINE: CPT

## 2020-01-27 PROCEDURE — 85025 COMPLETE CBC W/AUTO DIFF WBC: CPT

## 2020-01-29 ENCOUNTER — OFFICE VISIT (OUTPATIENT)
Dept: INTERNAL MEDICINE CLINIC | Facility: CLINIC | Age: 45
End: 2020-01-29

## 2020-01-29 VITALS
SYSTOLIC BLOOD PRESSURE: 100 MMHG | HEART RATE: 60 BPM | DIASTOLIC BLOOD PRESSURE: 62 MMHG | WEIGHT: 181 LBS | BODY MASS INDEX: 28.41 KG/M2 | TEMPERATURE: 98 F | HEIGHT: 66.75 IN

## 2020-01-29 DIAGNOSIS — G43.009 MIGRAINE WITHOUT AURA AND WITHOUT STATUS MIGRAINOSUS, NOT INTRACTABLE: ICD-10-CM

## 2020-01-29 DIAGNOSIS — Z00.00 ROUTINE GENERAL MEDICAL EXAMINATION AT A HEALTH CARE FACILITY: Primary | ICD-10-CM

## 2020-01-29 DIAGNOSIS — E03.9 ACQUIRED HYPOTHYROIDISM: ICD-10-CM

## 2020-01-29 PROCEDURE — 99396 PREV VISIT EST AGE 40-64: CPT | Performed by: NURSE PRACTITIONER

## 2020-01-29 RX ORDER — LEVOTHYROXINE SODIUM 0.15 MG/1
137 TABLET ORAL
Qty: 90 TABLET | Refills: 1 | Status: SHIPPED | OUTPATIENT
Start: 2020-01-29 | End: 2020-09-21

## 2020-01-29 NOTE — PROGRESS NOTES
Delaney Garcia is a 38year old female who presents for a complete physical exam:       Patient complains of:    Seeing Mariano Dill in weight loss clinic  Doing well. topamax added which she feels has helped her headaches.     Hypothyroid  Levothyroxine HCL 50 MG Oral Tab TAKE 1 TABLET BY MOUTH EVERY DAY 90 tablet 1   • spironolactone 50 MG Oral Tab Take 1 tablet (50 mg total) by mouth daily.  90 tablet 1   • SUMATRIPTAN SUCCINATE 100 MG Oral Tab TAKE 1 TAB BY MOUTH AT ONSET OF HEADACHE, REPEAT DOSE IN 2 H Hypertension Mother    • Asthma Son    • No Known Problems Maternal Grandfather    • No Known Problems Paternal Grandmother    • No Known Problems Son    • No Known Problems Son            Health Maintenance  Immunizations: flu done    Immunization History clear  EYES:PERRLA, EOMI, conjunctiva are clear  NECK: supple,no adenopathy,  CHEST: no chest tenderness  BREAST: no dominant or suspicious mass  LUNGS: clear to auscultation  CARDIO: RRR without murmur  GI: good BS's,no masses, HSM or tenderness  : Vagi

## 2020-02-03 ENCOUNTER — OFFICE VISIT (OUTPATIENT)
Dept: INTERNAL MEDICINE CLINIC | Facility: CLINIC | Age: 45
End: 2020-02-03

## 2020-02-03 VITALS
RESPIRATION RATE: 16 BRPM | HEIGHT: 68 IN | WEIGHT: 180 LBS | SYSTOLIC BLOOD PRESSURE: 128 MMHG | BODY MASS INDEX: 27.28 KG/M2 | DIASTOLIC BLOOD PRESSURE: 78 MMHG | HEART RATE: 74 BPM

## 2020-02-03 DIAGNOSIS — Z51.81 ENCOUNTER FOR THERAPEUTIC DRUG MONITORING: Primary | ICD-10-CM

## 2020-02-03 DIAGNOSIS — E53.8 VITAMIN B12 DEFICIENCY: ICD-10-CM

## 2020-02-03 DIAGNOSIS — E72.12 MTHFR (METHYLENE THF REDUCTASE) DEFICIENCY AND HOMOCYSTINURIA (HCC): ICD-10-CM

## 2020-02-03 DIAGNOSIS — E72.11 MTHFR (METHYLENE THF REDUCTASE) DEFICIENCY AND HOMOCYSTINURIA (HCC): ICD-10-CM

## 2020-02-03 DIAGNOSIS — F41.8 ANXIETY WITH DEPRESSION: ICD-10-CM

## 2020-02-03 DIAGNOSIS — E66.9 OBESITY (BMI 30.0-34.9): ICD-10-CM

## 2020-02-03 DIAGNOSIS — E03.9 ACQUIRED HYPOTHYROIDISM: ICD-10-CM

## 2020-02-03 PROCEDURE — 96372 THER/PROPH/DIAG INJ SC/IM: CPT | Performed by: NURSE PRACTITIONER

## 2020-02-03 PROCEDURE — 99214 OFFICE O/P EST MOD 30 MIN: CPT | Performed by: NURSE PRACTITIONER

## 2020-02-03 RX ORDER — PHENTERMINE HYDROCHLORIDE 37.5 MG/1
37.5 TABLET ORAL
Qty: 30 TABLET | Refills: 1 | Status: SHIPPED | OUTPATIENT
Start: 2020-02-03 | End: 2020-03-02

## 2020-02-03 RX ORDER — CYANOCOBALAMIN 1000 UG/ML
1000 INJECTION INTRAMUSCULAR; SUBCUTANEOUS ONCE
Status: COMPLETED | OUTPATIENT
Start: 2020-02-03 | End: 2020-02-03

## 2020-02-03 RX ADMIN — CYANOCOBALAMIN 1000 MCG: 1000 INJECTION INTRAMUSCULAR; SUBCUTANEOUS at 13:45:00

## 2020-02-03 NOTE — PATIENT INSTRUCTIONS
Keep up the great work!! # 16 lbs of weight loss so far@!     PLAN:  Continue with medications: phentermine and topamax   Follow up with me in 1 month  Schedule follow up appointments:  Dietitian/nutritionist      Please try to work on the following dietar meditation, clarity, sleep, and raul to your daily life.

## 2020-02-03 NOTE — PROGRESS NOTES
HISTORY OF PRESENT ILLNESS  Patient presents with:  Weight Check: lost 7 pounds    Vesta Jennings is a 38year old female here for follow up with medical weight loss program for the treatment of overweight, obesity, or morbid obesity.  Patient has lost- 2 cookies Water: adequate   Soda: 2 coke zero  Juice:  Coffee/Tea: 2 coffee     Sweet/ Salty tooth:  Portion: medium  Eats 3 meals per day: yes   Number of restaurant or fast food meals/week: 3 meals/week    Social hx and lifestyle reviewed:    Work: RN at 01/27/2020    OSMOCALC 288 01/27/2020    ALKPHO 69 01/27/2020    AST 19 01/27/2020    ALT 34 01/27/2020    BILT 0.6 01/27/2020    TP 8.0 01/27/2020    ALB 4.0 01/27/2020    GLOBULT 3.0 01/12/2016    GLOBULIN 4.0 01/27/2020    ALBGLOBRAT 1.5 01/12/2016    N to face every morning., Disp: 118 mL, Rfl: 2  ibuprofen (MOTRIN) 400 MG Oral Tab, Take 400 mg by mouth every 6 (six) hours as needed for Pain., Disp: , Rfl:   Multiple Vitamins-Minerals (MULTIVITAMIN & MINERAL OR), Take  by mouth daily. , Disp: , Rfl:   Asp effects reviewed with patient.    · Will continue with phentermine 37.5mg  · Will continue topamax 50mg bid off label for weight loss   · Will start vitamin b12 #4  · Is currently taking wellbutrin from PCP   Contraindications: had taken phentermine in the

## 2020-02-05 DIAGNOSIS — Z51.81 ENCOUNTER FOR THERAPEUTIC DRUG MONITORING: ICD-10-CM

## 2020-02-05 DIAGNOSIS — E66.9 OBESITY (BMI 30.0-34.9): ICD-10-CM

## 2020-02-05 RX ORDER — TOPIRAMATE 50 MG/1
TABLET, FILM COATED ORAL
Qty: 60 TABLET | Refills: 1 | OUTPATIENT
Start: 2020-02-05

## 2020-02-05 NOTE — TELEPHONE ENCOUNTER
Requesting topiramate  LOV: 2/3/2020  RTC: one month  Last Relevant Labs: na  Filled: 1/9/2020 #60 with 1 refills    Future Appointments   Date Time Provider Melissa Ortiz   3/2/2020  9:00 AM [place]OR [place] [facility] [place] [address]   6/10/2020  9:

## 2020-02-19 RX ORDER — BUPROPION HYDROCHLORIDE 150 MG/1
TABLET ORAL
Qty: 90 TABLET | Refills: 3 | Status: SHIPPED | OUTPATIENT
Start: 2020-02-19 | End: 2021-03-22

## 2020-02-19 NOTE — TELEPHONE ENCOUNTER
Last Ov: 1/29/20, SD, physical  Upcoming appt: no upcoming appt  Last labs: Free T4, TSH w Ref, Lipid, CMP, CBC 1/27/20  Last Rx: bupropion ER 150mg, #90, 3R 1/18/19    Per Protocol, not on protocol. Rx pending.

## 2020-03-02 ENCOUNTER — OFFICE VISIT (OUTPATIENT)
Dept: INTERNAL MEDICINE CLINIC | Facility: CLINIC | Age: 45
End: 2020-03-02

## 2020-03-02 VITALS
SYSTOLIC BLOOD PRESSURE: 130 MMHG | RESPIRATION RATE: 16 BRPM | HEIGHT: 68 IN | BODY MASS INDEX: 26.83 KG/M2 | HEART RATE: 72 BPM | WEIGHT: 177 LBS | DIASTOLIC BLOOD PRESSURE: 78 MMHG

## 2020-03-02 DIAGNOSIS — E53.8 VITAMIN B12 DEFICIENCY: ICD-10-CM

## 2020-03-02 DIAGNOSIS — E72.12 MTHFR (METHYLENE THF REDUCTASE) DEFICIENCY AND HOMOCYSTINURIA (HCC): ICD-10-CM

## 2020-03-02 DIAGNOSIS — Z51.81 ENCOUNTER FOR THERAPEUTIC DRUG MONITORING: ICD-10-CM

## 2020-03-02 DIAGNOSIS — E03.9 ACQUIRED HYPOTHYROIDISM: ICD-10-CM

## 2020-03-02 DIAGNOSIS — F41.8 ANXIETY WITH DEPRESSION: ICD-10-CM

## 2020-03-02 DIAGNOSIS — E72.11 MTHFR (METHYLENE THF REDUCTASE) DEFICIENCY AND HOMOCYSTINURIA (HCC): ICD-10-CM

## 2020-03-02 PROCEDURE — 99214 OFFICE O/P EST MOD 30 MIN: CPT | Performed by: NURSE PRACTITIONER

## 2020-03-02 PROCEDURE — 96372 THER/PROPH/DIAG INJ SC/IM: CPT | Performed by: NURSE PRACTITIONER

## 2020-03-02 RX ORDER — CYANOCOBALAMIN 1000 UG/ML
1000 INJECTION INTRAMUSCULAR; SUBCUTANEOUS ONCE
Status: COMPLETED | OUTPATIENT
Start: 2020-03-02 | End: 2020-03-02

## 2020-03-02 RX ORDER — PHENTERMINE HYDROCHLORIDE 37.5 MG/1
37.5 TABLET ORAL
Qty: 30 TABLET | Refills: 1 | Status: SHIPPED | OUTPATIENT
Start: 2020-03-02 | End: 2020-05-04

## 2020-03-02 RX ORDER — TOPIRAMATE 50 MG/1
50 TABLET, FILM COATED ORAL 2 TIMES DAILY
Qty: 90 TABLET | Refills: 1 | Status: SHIPPED | OUTPATIENT
Start: 2020-03-02 | End: 2020-05-25

## 2020-03-02 RX ADMIN — CYANOCOBALAMIN 1000 MCG: 1000 INJECTION INTRAMUSCULAR; SUBCUTANEOUS at 09:51:00

## 2020-03-02 NOTE — PATIENT INSTRUCTIONS
We are here to support you with weight loss, but please remember that you still need your primary care provider for your routine health maintenance.       PLAN:   Will continue with phentermine 37.5mg and topamax 50mg  vitamin b12 injection #5/6  Follow up Activity level: not very active (can't count exercise towards calorie number per day)                   ** Daily INPUT> Look at nutrition section-- \"nutrients\" and it will break down your macros for the day (ie.  Protein, carbs, fibers, suga

## 2020-03-02 NOTE — PROGRESS NOTES
HISTORY OF PRESENT ILLNESS  Patient presents with:  Weight Check: lost 3 pounds    Carola Story is a 38year old female here for follow up with medical weight loss program for the treatment of overweight, obesity, or morbid obesity.  Patient has lost /78   Pulse 72   Resp 16   Ht 68\"   Wt 177 lb (80.3 kg)   LMP 02/10/2020   BMI 26.91 kg/m² , Percent body fat: Female 38.1%;       GENERAL: well developed, well nourished, in no apparent distress, A/O x3  SKIN: no rashes,no suspicious lesions  HEENT Levothyroxine Sodium 150 MCG Oral Tab, Take 1 tablet (150 mcg total) by mouth once daily. , Disp: 90 tablet, Rfl: 1  SERTRALINE HCL 50 MG Oral Tab, TAKE 1 TABLET BY MOUTH EVERY DAY, Disp: 90 tablet, Rfl: 1  spironolactone 50 MG Oral Tab, Take 1 tablet (50 m PLAN   · Initial consult: #196 lbs on 10/2019 # Down 3 lb from previous visit  · Down 19 lbs total  · Continue with medications: phentermine 37.5mg  · --advised of side effects and adverse effects of this medication  · Continue with topamax 50mg bid off la 2. Drink lots of water and cut down on soda/juice consumption if soda/juice drinker  3. Focus on protein: (15-30 grams with each meal) ie. greek yogurt, cottage cheese, string cheese, hard boiled eggs  4.  Healthy snacks: peanut butter and apples, hummus an -dried edamame   -poncho seeds soaked in water or almond milk  -soy nuts  -cured meats (monitor for sodium issues)   -hummus with vegetables  -bean dip with vegetables     FRUIT  Low carb fruit options   Raspberries: Half a cup (60 grams) contains 3 grams of

## 2020-03-09 ENCOUNTER — TELEPHONE (OUTPATIENT)
Dept: INTERNAL MEDICINE CLINIC | Facility: CLINIC | Age: 45
End: 2020-03-09

## 2020-03-09 NOTE — TELEPHONE ENCOUNTER
Matias Watson at [facility] transferred me to the pharmacy to give verbal for their mistake.   Given to the pharmacist Adela Mccormack

## 2020-04-01 ENCOUNTER — E-VISIT (OUTPATIENT)
Dept: FAMILY MEDICINE CLINIC | Facility: CLINIC | Age: 45
End: 2020-04-01

## 2020-04-01 DIAGNOSIS — J02.9 SORE THROAT: Primary | ICD-10-CM

## 2020-04-01 RX ORDER — AMOXICILLIN 875 MG/1
875 TABLET, COATED ORAL 2 TIMES DAILY
Qty: 20 TABLET | Refills: 0 | Status: SHIPPED | OUTPATIENT
Start: 2020-04-01 | End: 2020-04-11

## 2020-04-02 NOTE — PROGRESS NOTES
Dallas Tapia is a 38year old female. HPI:   See answers to questions above. Current Outpatient Medications   Medication Sig Dispense Refill   • amoxicillin 875 MG Oral Tab Take 1 tablet (875 mg total) by mouth 2 (two) times daily for 10 days.  2 Date   •      • LASER SURGERY OF EYE      corneal LASIK   • ORAL SURGERY PROCEDURE  20yo    wisdom teeth extraction   • TUBAL LIGATION        Family History   Problem Relation Age of Onset   • Heart Attack Maternal Grandmother    • Stroke [facility]

## 2020-04-06 ENCOUNTER — HOSPITAL ENCOUNTER (OUTPATIENT)
Age: 45
Discharge: HOME | End: 2020-04-06
Attending: FAMILY MEDICINE
Payer: COMMERCIAL

## 2020-04-06 ENCOUNTER — HOSPITAL ENCOUNTER (OUTPATIENT)
Dept: CT IMAGING | Age: 45
Discharge: HOME | End: 2020-04-06
Attending: FAMILY MEDICINE
Payer: COMMERCIAL

## 2020-04-06 VITALS
OXYGEN SATURATION: 98 % | HEART RATE: 59 BPM | TEMPERATURE: 99 F | RESPIRATION RATE: 16 BRPM | WEIGHT: 170 LBS | SYSTOLIC BLOOD PRESSURE: 134 MMHG | DIASTOLIC BLOOD PRESSURE: 71 MMHG | BODY MASS INDEX: 26 KG/M2

## 2020-04-06 DIAGNOSIS — H10.32 ACUTE CONJUNCTIVITIS OF LEFT EYE, UNSPECIFIED ACUTE CONJUNCTIVITIS TYPE: ICD-10-CM

## 2020-04-06 DIAGNOSIS — G51.0 BELL'S PALSY: Primary | ICD-10-CM

## 2020-04-06 PROCEDURE — 99214 OFFICE O/P EST MOD 30 MIN: CPT

## 2020-04-06 PROCEDURE — 70450 CT HEAD/BRAIN W/O DYE: CPT | Performed by: FAMILY MEDICINE

## 2020-04-06 RX ORDER — VALACYCLOVIR HYDROCHLORIDE 1 G/1
1 TABLET, FILM COATED ORAL 3 TIMES DAILY
Qty: 21 TABLET | Refills: 0 | Status: SHIPPED | OUTPATIENT
Start: 2020-04-06 | End: 2020-04-13

## 2020-04-06 RX ORDER — PREDNISONE 20 MG/1
60 TABLET ORAL DAILY
Qty: 21 TABLET | Refills: 0 | Status: SHIPPED | OUTPATIENT
Start: 2020-04-06 | End: 2020-04-13

## 2020-04-06 RX ORDER — ERYTHROMYCIN 5 MG/G
1 OINTMENT OPHTHALMIC 3 TIMES DAILY
Qty: 3.5 G | Refills: 0 | Status: SHIPPED | OUTPATIENT
Start: 2020-04-06 | End: 2020-04-11

## 2020-04-06 NOTE — ED INITIAL ASSESSMENT (HPI)
Pt states starting last night having sensations to left cheek while blowing nose. Pt states today unable to blow out cheeks. Also unable to blink left eye and smile crooked.   Pt states while drinking fluids dribbling out of left eye

## 2020-04-06 NOTE — ED PROVIDER NOTES
Patient Seen in: [id number] [facility]      History   Patient presents with: Eye Visual Problem    Stated Complaint: Eye Issues    HPI    52-year-old female presents with complaints of left-sided facial weakness that started last night.   Jose 77.1 kg   LMP 03/30/2020   SpO2 98%   BMI 25.85 kg/m²         Physical Exam    Patient is alert oriented ×3 in no acute distress  Slight redness to the bulbar conjunctiva on the left eye.   Fluorescein staining shows some fluorescein staining over the conju Patient is unable to close left eye. Patient denies any injury, headache, visual change or other neurological symptom. Patient has history of MTHFR a clotting disorder. FINDINGS:  The ventricles are normal in size and configuration.  There is no evidence List as of 4/6/2020 12:46 PM    START taking these medications    erythromycin 5 MG/GM Ophthalmic Ointment  Place 1 Application into the left eye 3 (three) times daily for 5 days. , Normal, Disp-3.5 g, R-0    predniSONE 20 MG Oral Tab  Take 3 tablets (60 mg

## 2020-04-13 ENCOUNTER — TELEMEDICINE (OUTPATIENT)
Dept: INTERNAL MEDICINE CLINIC | Facility: CLINIC | Age: 45
End: 2020-04-13

## 2020-04-13 DIAGNOSIS — G51.0 BELL'S PALSY: Primary | ICD-10-CM

## 2020-04-13 PROCEDURE — 99213 OFFICE O/P EST LOW 20 MIN: CPT | Performed by: NURSE PRACTITIONER

## 2020-04-13 NOTE — PROGRESS NOTES
Due to COVID-19 ACTION PLAN, the patient's office visit was converted to a phone or video visit. This visit is conducted using live video and/or audio. The patient consents to this service.   The patient understands and accepts financial responsibility fo Sig Dispense Refill   • predniSONE 20 MG Oral Tab Take 3 tablets (60 mg total) by mouth daily for 7 days. 21 tablet 0   • valACYclovir HCl 1 G Oral Tab Take 1 tablet (1,000 mg total) by mouth 3 (three) times daily for 7 days.  21 tablet 0   • Phentermine HC visit. Appropriate affect, alert and oriented x 3. No distress. No conversational dyspnea. Speaking in full sentences. Sounds comfortable. Left facial droop is mild. Asymmetry with smile. Unable to blink left eye.       ASSESSMENT AND PLAN:     Weplay Rickie

## 2020-05-03 NOTE — PROGRESS NOTES
Virtual Telephone Check-In    [facility] verbally consents to a Virtual/Telephone Check-In visit on 5/4/2020. Patient understands and accepts financial responsibility for any deductible, co-insurance and/or co-pays associated with this service. visit:    Encounter for therapeutic drug monitoring  BMI 27.0-27.9,adult  See below     Vitamin B12 deficiency  Will continue with injection #6, when the office opens back up     Acquired hypothyroidism  Stable, on meds with pcp    Anxiety with depression

## 2020-05-04 ENCOUNTER — VIRTUAL PHONE E/M (OUTPATIENT)
Dept: INTERNAL MEDICINE CLINIC | Facility: CLINIC | Age: 45
End: 2020-05-04

## 2020-05-04 VITALS — WEIGHT: 173 LBS | BODY MASS INDEX: 26 KG/M2

## 2020-05-04 DIAGNOSIS — E53.8 VITAMIN B12 DEFICIENCY: ICD-10-CM

## 2020-05-04 DIAGNOSIS — Z51.81 ENCOUNTER FOR THERAPEUTIC DRUG MONITORING: Primary | ICD-10-CM

## 2020-05-04 DIAGNOSIS — E03.9 ACQUIRED HYPOTHYROIDISM: ICD-10-CM

## 2020-05-04 DIAGNOSIS — F41.8 ANXIETY WITH DEPRESSION: ICD-10-CM

## 2020-05-04 PROCEDURE — 99213 OFFICE O/P EST LOW 20 MIN: CPT | Performed by: NURSE PRACTITIONER

## 2020-05-04 RX ORDER — PHENTERMINE HYDROCHLORIDE 37.5 MG/1
37.5 TABLET ORAL
Qty: 30 TABLET | Refills: 1 | Status: SHIPPED | OUTPATIENT
Start: 2020-05-04 | End: 2020-06-22

## 2020-05-04 NOTE — PATIENT INSTRUCTIONS
We are here to support you with weight loss, but please remember that you still need your primary care provider for your routine health maintenance.       PLAN:  Continue with phentermine 37,5mg and topamax 50mg   Follow up with me in 2 month  Schedule foll ** Daily INPUT> Look at nutrition section-- \"nutrients\" and it will break down your macros for the day (ie. Protein, carbs, fibers, sugars and fats). Try to stay within these numbers daily     2.  \"7 minute workout\" to help with exercise/a

## 2020-05-23 DIAGNOSIS — Z51.81 ENCOUNTER FOR THERAPEUTIC DRUG MONITORING: ICD-10-CM

## 2020-05-25 RX ORDER — TOPIRAMATE 50 MG/1
TABLET, FILM COATED ORAL
Qty: 180 TABLET | Refills: 0 | Status: SHIPPED | OUTPATIENT
Start: 2020-05-25 | End: 2020-07-21

## 2020-05-25 NOTE — TELEPHONE ENCOUNTER
Requesting Topiramate 50 mg  LOV: 5/4/20  RTC: 8 weeks  Last Relevant Labs: na  Filled: 3/2/20 #90 with 1 refills    6/22/2020 10:40 AM JÚNIOR Barrios [facility]   7/7/2020  9:00 AM Jackie Rivero APRN [facility]     Pended

## 2020-06-21 NOTE — PROGRESS NOTES
Virtual Telephone Check-In    [facility] verbally consents to a Virtual/Telephone Check-In visit on 6/22/2020. Patient understands and accepts financial responsibility for any deductible, co-insurance and/or co-pays associated with this service. breathing    Assessment/Plan:   Diagnoses and all orders for this visit:    Encounter for therapeutic drug monitoring  BMI 27.0-27.9,adult  See below     Vitamin B12 deficiency  Will continue with injection #6, when the office opens back up     Acquired hy

## 2020-06-22 ENCOUNTER — VIRTUAL PHONE E/M (OUTPATIENT)
Dept: INTERNAL MEDICINE CLINIC | Facility: CLINIC | Age: 45
End: 2020-06-22

## 2020-06-22 VITALS — WEIGHT: 175 LBS | BODY MASS INDEX: 27 KG/M2

## 2020-06-22 DIAGNOSIS — Z51.81 ENCOUNTER FOR THERAPEUTIC DRUG MONITORING: Primary | ICD-10-CM

## 2020-06-22 DIAGNOSIS — E53.8 VITAMIN B12 DEFICIENCY: ICD-10-CM

## 2020-06-22 DIAGNOSIS — F41.8 ANXIETY WITH DEPRESSION: ICD-10-CM

## 2020-06-22 DIAGNOSIS — E03.9 ACQUIRED HYPOTHYROIDISM: ICD-10-CM

## 2020-06-22 PROCEDURE — 99213 OFFICE O/P EST LOW 20 MIN: CPT | Performed by: NURSE PRACTITIONER

## 2020-06-22 RX ORDER — PHENTERMINE HYDROCHLORIDE 37.5 MG/1
37.5 TABLET ORAL
Qty: 30 TABLET | Refills: 0 | Status: SHIPPED | OUTPATIENT
Start: 2020-07-02 | End: 2020-07-21

## 2020-06-22 NOTE — PATIENT INSTRUCTIONS
We are here to support you with weight loss, but please remember that you still need your primary care provider for your routine health maintenance.       PLAN:  Will continue with phentermine 37.5mg and topamax 50mg   Follow up with me in 1 month  Schedule ** Daily INPUT> Look at nutrition section-- \"nutrients\" and it will break down your macros for the day (ie. Protein, carbs, fibers, sugars and fats). Try to stay within these numbers daily     2.  \"7 minute workout\" to help with exercise/a

## 2020-07-13 DIAGNOSIS — Z78.9 PARTICIPANT IN HEALTH AND WELLNESS PLAN: Primary | ICD-10-CM

## 2020-07-21 ENCOUNTER — VIRTUAL PHONE E/M (OUTPATIENT)
Dept: INTERNAL MEDICINE CLINIC | Facility: CLINIC | Age: 45
End: 2020-07-21

## 2020-07-21 VITALS — BODY MASS INDEX: 26 KG/M2 | WEIGHT: 172 LBS

## 2020-07-21 DIAGNOSIS — E53.8 VITAMIN B12 DEFICIENCY: ICD-10-CM

## 2020-07-21 DIAGNOSIS — Z51.81 ENCOUNTER FOR THERAPEUTIC DRUG MONITORING: Primary | ICD-10-CM

## 2020-07-21 DIAGNOSIS — F41.8 ANXIETY WITH DEPRESSION: ICD-10-CM

## 2020-07-21 DIAGNOSIS — E03.9 ACQUIRED HYPOTHYROIDISM: ICD-10-CM

## 2020-07-21 PROCEDURE — 99213 OFFICE O/P EST LOW 20 MIN: CPT | Performed by: NURSE PRACTITIONER

## 2020-07-21 RX ORDER — PHENTERMINE HYDROCHLORIDE 37.5 MG/1
37.5 TABLET ORAL
Qty: 30 TABLET | Refills: 1 | Status: SHIPPED | OUTPATIENT
Start: 2020-07-31 | End: 2020-09-28

## 2020-07-21 RX ORDER — TOPIRAMATE 50 MG/1
50 TABLET, FILM COATED ORAL 2 TIMES DAILY
Qty: 180 TABLET | Refills: 0 | Status: SHIPPED | OUTPATIENT
Start: 2020-08-20 | End: 2021-03-15

## 2020-07-21 NOTE — PATIENT INSTRUCTIONS
We are here to support you with weight loss, but please remember that you still need your primary care provider for your routine health maintenance.       PLAN:  Will continue with phentermine 37.5mg and topamax  Follow up with me in 2 month  Schedule rico ** Daily INPUT> Look at nutrition section-- \"nutrients\" and it will break down your macros for the day (ie. Protein, carbs, fibers, sugars and fats). Try to stay within these numbers daily     2.  \"7 minute workout\" to help with exercise/a

## 2020-07-21 NOTE — PROGRESS NOTES
Virtual Telephone Check-In    Buzz Kawasaki verbally consents to a Virtual/Telephone Check-In visit on 7/21/2020. Patient understands and accepts financial responsibility for any deductible, co-insurance and/or co-pays associated with this service. visit:    Encounter for therapeutic drug monitoring  BMI 27.0-27.9,adult  See below     Vitamin B12 deficiency  Will continue with injection #6, when the office opens back up     Acquired hypothyroidism  Stable, on meds with pcp    Anxiety with depression

## 2020-08-15 NOTE — TELEPHONE ENCOUNTER
LOV:1/29/20, CPE, SD  Last CPE:1/29/20  Last refill:SUMATRIPTAN SUCCINATE 100 MG Oral Tab-9/11/19  Quantity:10 tablet, 3 refills  Last labs that are related: cbc 1/27/20  Future appointment:  Future Appointments   Date Time Provider Melissa Ortiz   8/31/2020  9:00 AM [facility] Antibody [facility]   12/9/2020  9:00 AM Kayli Massey MD [facility]     Protocol: pend for provider    Please approve or deny

## 2020-08-17 RX ORDER — SUMATRIPTAN 100 MG/1
TABLET, FILM COATED ORAL
Qty: 27 TABLET | Refills: 0 | Status: SHIPPED | OUTPATIENT
Start: 2020-08-17 | End: 2020-09-08

## 2020-08-22 NOTE — TELEPHONE ENCOUNTER
Last Ov:1/29/20  Upcoming appt:none  Last labs:1/27/20 cbc, cmp, lipid, tsh, t4  Last Rx:  12/19/19 sertraline HCL 50mg  Per Protocol sent for review

## 2020-08-31 ENCOUNTER — NURSE ONLY (OUTPATIENT)
Dept: LAB | Age: 45
End: 2020-08-31
Attending: PREVENTIVE MEDICINE
Payer: COMMERCIAL

## 2020-08-31 DIAGNOSIS — Z78.9 PARTICIPANT IN HEALTH AND WELLNESS PLAN: ICD-10-CM

## 2020-08-31 LAB — SARS-COV-2 IGG SERPLBLD QL IA.RAPID: NEGATIVE

## 2020-08-31 PROCEDURE — 86769 SARS-COV-2 COVID-19 ANTIBODY: CPT

## 2020-09-08 RX ORDER — SUMATRIPTAN 100 MG/1
TABLET, FILM COATED ORAL
Qty: 18 TABLET | Refills: 1 | Status: SHIPPED | OUTPATIENT
Start: 2020-09-08 | End: 2021-05-13

## 2020-09-08 NOTE — TELEPHONE ENCOUNTER
Last Ov: 1/29/20, SD, CPE  Last labs: Free T4, TSH w Ref, Lipid, CMP, CBC 1/27/20  Last Rx: sumatriptan 100mg, #27, 0R 8/17/20    Future Appointments   Date Time Provider Melissa Ortiz   12/9/2020  9:00 AM Bryan Catherine MD  [address]       Per

## 2020-09-21 RX ORDER — LEVOTHYROXINE SODIUM 0.15 MG/1
137 TABLET ORAL
Qty: 90 TABLET | Refills: 1 | Status: SHIPPED | OUTPATIENT
Start: 2020-09-21 | End: 2021-04-05

## 2020-09-28 ENCOUNTER — LAB ENCOUNTER (OUTPATIENT)
Dept: LAB | Age: 45
End: 2020-09-28
Attending: NURSE PRACTITIONER
Payer: COMMERCIAL

## 2020-09-28 ENCOUNTER — VIRTUAL PHONE E/M (OUTPATIENT)
Dept: INTERNAL MEDICINE CLINIC | Facility: CLINIC | Age: 45
End: 2020-09-28

## 2020-09-28 DIAGNOSIS — E03.9 ACQUIRED HYPOTHYROIDISM: ICD-10-CM

## 2020-09-28 DIAGNOSIS — Z51.81 ENCOUNTER FOR THERAPEUTIC DRUG MONITORING: ICD-10-CM

## 2020-09-28 LAB
T4 FREE SERPL-MCNC: 1.2 NG/DL (ref 0.8–1.7)
TSI SER-ACNC: 1.59 MIU/ML (ref 0.36–3.74)

## 2020-09-28 PROCEDURE — 36415 COLL VENOUS BLD VENIPUNCTURE: CPT

## 2020-09-28 PROCEDURE — 99213 OFFICE O/P EST LOW 20 MIN: CPT | Performed by: PHYSICIAN ASSISTANT

## 2020-09-28 PROCEDURE — 84439 ASSAY OF FREE THYROXINE: CPT

## 2020-09-28 PROCEDURE — 84443 ASSAY THYROID STIM HORMONE: CPT

## 2020-09-28 RX ORDER — PHENTERMINE HYDROCHLORIDE 37.5 MG/1
37.5 TABLET ORAL
Qty: 30 TABLET | Refills: 0 | Status: SHIPPED | OUTPATIENT
Start: 2020-09-28 | End: 2021-03-15

## 2020-09-28 NOTE — PROGRESS NOTES
Virtual Telephone Check-In    Sagrario Villeda verbally consents to a Virtual/Telephone Check-In visit on 9/28/2020. Patient understands and accepts financial responsibility for any deductible, co-insurance and/or co-pays associated with this service. speaking in full sentences, no increased work of breathing    Assessment/Plan:   Diagnoses and all orders for this visit:    Encounter for therapeutic drug monitoring  -     Phentermine HCl 37.5 MG Oral Tab;  Take 1 tablet (37.5 mg total) by mouth every mor

## 2020-11-09 ENCOUNTER — HOSPITAL ENCOUNTER (OUTPATIENT)
Age: 45
Discharge: HOME | End: 2020-11-09
Payer: COMMERCIAL

## 2020-11-09 VITALS
RESPIRATION RATE: 18 BRPM | DIASTOLIC BLOOD PRESSURE: 83 MMHG | SYSTOLIC BLOOD PRESSURE: 145 MMHG | TEMPERATURE: 97 F | OXYGEN SATURATION: 100 % | HEART RATE: 60 BPM

## 2020-11-09 DIAGNOSIS — Z20.822 CLOSE EXPOSURE TO COVID-19 VIRUS: Primary | ICD-10-CM

## 2020-11-09 PROCEDURE — 99214 OFFICE O/P EST MOD 30 MIN: CPT | Performed by: NURSE PRACTITIONER

## 2020-11-09 NOTE — ED PROVIDER NOTES
Patient Seen in: [facility] [address]      History   Patient presents with:  Testing: Entered by patient    Stated Complaint: cough, congestion, headache, exposure x1 day    Patient presents to immediate care for COVID testing.   Patient states 96.7 °F (35.9 °C)   Temp src Temporal   SpO2 100 %   O2 Device None (Room air)       Current:/83   Pulse 60   Temp 96.7 °F (35.9 °C) (Temporal)   Resp 18   LMP 11/02/2020   SpO2 100%         Physical Exam  Constitutional:       Appearance: Normal razia

## 2020-11-09 NOTE — ED INITIAL ASSESSMENT (HPI)
Pt here for testing. States she was exposed to a pt with covid on Friday. Pt having h/a, congestion and cough.

## 2021-02-12 ENCOUNTER — TELEPHONE (OUTPATIENT)
Dept: INTERNAL MEDICINE CLINIC | Facility: CLINIC | Age: 46
End: 2021-02-12

## 2021-02-12 DIAGNOSIS — Z00.00 ROUTINE GENERAL MEDICAL EXAMINATION AT A HEALTH CARE FACILITY: Primary | ICD-10-CM

## 2021-02-12 DIAGNOSIS — Z13.0 SCREENING FOR DISORDER OF BLOOD AND BLOOD-FORMING ORGANS: ICD-10-CM

## 2021-02-12 DIAGNOSIS — Z13.29 SCREENING FOR THYROID DISORDER: ICD-10-CM

## 2021-02-12 DIAGNOSIS — Z13.228 SCREENING FOR METABOLIC DISORDER: ICD-10-CM

## 2021-02-12 DIAGNOSIS — Z13.220 SCREENING FOR LIPID DISORDERS: ICD-10-CM

## 2021-02-12 NOTE — TELEPHONE ENCOUNTER
Bloodwork orders placed to [facility] lab for upcoming physical per protocol.

## 2021-02-12 NOTE — TELEPHONE ENCOUNTER
Future Appointments   Date Time Provider Melissa Diana   3/5/2021  1:00 PM JÚNIOR Rdz [facility] [address] [facility]     Orders to edward -Pt informed that labs need to be completed no sooner than 2 weeks prior to the appt.  Pt aware to fast-no call back

## 2021-03-01 ENCOUNTER — TELEPHONE (OUTPATIENT)
Dept: INTERNAL MEDICINE CLINIC | Facility: CLINIC | Age: 46
End: 2021-03-01

## 2021-03-01 NOTE — TELEPHONE ENCOUNTER
CPE   Future Appointments   Date Time Provider Melissa Ortiz   3/22/2021  1:00 PM JÚNIOR Khan [facility] [address] [facility]      Orders to [facility]  aware must fast no call back required

## 2021-03-15 ENCOUNTER — OFFICE VISIT (OUTPATIENT)
Dept: INTERNAL MEDICINE CLINIC | Facility: CLINIC | Age: 46
End: 2021-03-15

## 2021-03-15 VITALS
SYSTOLIC BLOOD PRESSURE: 124 MMHG | HEART RATE: 58 BPM | DIASTOLIC BLOOD PRESSURE: 82 MMHG | WEIGHT: 181 LBS | HEIGHT: 68 IN | BODY MASS INDEX: 27.43 KG/M2 | RESPIRATION RATE: 16 BRPM

## 2021-03-15 DIAGNOSIS — E03.9 ACQUIRED HYPOTHYROIDISM: ICD-10-CM

## 2021-03-15 DIAGNOSIS — E53.8 VITAMIN B12 DEFICIENCY: ICD-10-CM

## 2021-03-15 DIAGNOSIS — E55.9 VITAMIN D DEFICIENCY: ICD-10-CM

## 2021-03-15 DIAGNOSIS — Z23 NEED FOR VACCINATION: ICD-10-CM

## 2021-03-15 DIAGNOSIS — F41.8 ANXIETY WITH DEPRESSION: ICD-10-CM

## 2021-03-15 DIAGNOSIS — Z51.81 ENCOUNTER FOR THERAPEUTIC DRUG MONITORING: Primary | ICD-10-CM

## 2021-03-15 PROCEDURE — 3008F BODY MASS INDEX DOCD: CPT | Performed by: NURSE PRACTITIONER

## 2021-03-15 PROCEDURE — 3074F SYST BP LT 130 MM HG: CPT | Performed by: NURSE PRACTITIONER

## 2021-03-15 PROCEDURE — 99214 OFFICE O/P EST MOD 30 MIN: CPT | Performed by: NURSE PRACTITIONER

## 2021-03-15 PROCEDURE — 3079F DIAST BP 80-89 MM HG: CPT | Performed by: NURSE PRACTITIONER

## 2021-03-15 RX ORDER — PHENTERMINE HYDROCHLORIDE 37.5 MG/1
37.5 TABLET ORAL
Qty: 30 TABLET | Refills: 0 | Status: CANCELLED | OUTPATIENT
Start: 2021-03-15

## 2021-03-15 RX ORDER — PHENTERMINE HYDROCHLORIDE 15 MG/1
15 CAPSULE ORAL EVERY MORNING
Qty: 30 CAPSULE | Refills: 0 | Status: SHIPPED | OUTPATIENT
Start: 2021-03-15 | End: 2021-04-12

## 2021-03-15 RX ORDER — TOPIRAMATE 50 MG/1
50 TABLET, FILM COATED ORAL 2 TIMES DAILY
Qty: 180 TABLET | Refills: 0 | Status: SHIPPED | OUTPATIENT
Start: 2021-03-15 | End: 2021-06-08

## 2021-03-15 NOTE — PATIENT INSTRUCTIONS
We are here to support you with weight loss, but please remember that you still need your primary care provider for your routine health maintenance.       PLAN:  Will restart phentermine 15mg, continue with topamax   Follow up with me in 1 month  Schedule f ** Daily INPUT> Look at nutrition section-- \"nutrients\" and it will break down your macros for the day (ie. Protein, carbs, fibers, sugars and fats). Try to stay within these numbers daily     2.  \"7 minute workout\" to help with exercise/activity wh

## 2021-03-15 NOTE — PROGRESS NOTES
HISTORY OF PRESENT ILLNESS  Patient presents with:  Weight Check: up 4 lbs     Deana Waters is a 41year old female here for follow up with medical weight loss program for the treatment of overweight, obesity, or morbid obesity.      Up#4 lbs  Has bee developed, well nourished, in no apparent distress, A/O x3  SKIN: no rashes, no suspicious lesions  HEENT: conjunctiva pink, sclera non icteric, PERRLA  NECK: supple, no adenopathy  LUNGS: CTA in all fields, breathing non labored  CARDIO: RRR without murmu MOUTH AT ONSET OF HEADACHE, REPEAT DOSE IN 2 HRS AS NEEDED TO A MAX OF 2 TABS IN 24HRS, Disp: 18 tablet, Rfl: 1  SERTRALINE HCL 50 MG Oral Tab, TAKE 1 TABLET BY MOUTH EVERY DAY, Disp: 90 tablet, Rfl: 1  BUPROPION HCL ER, XL, 150 MG Oral Tablet 24 Hr, TAKE do another round of vitamin b12 injections   · Try out protein shake on nights that working long hours  · Hypothyroid- stable, continue with current medication regimen, managed by PCP   · Nutrition: Low carb diet, recommended to eat breakfast daily/ regula

## 2021-03-22 ENCOUNTER — OFFICE VISIT (OUTPATIENT)
Dept: INTERNAL MEDICINE CLINIC | Facility: CLINIC | Age: 46
End: 2021-03-22

## 2021-03-22 VITALS
DIASTOLIC BLOOD PRESSURE: 86 MMHG | SYSTOLIC BLOOD PRESSURE: 134 MMHG | BODY MASS INDEX: 27.89 KG/M2 | HEART RATE: 68 BPM | WEIGHT: 184 LBS | TEMPERATURE: 97 F | HEIGHT: 68 IN

## 2021-03-22 DIAGNOSIS — E78.5 DYSLIPIDEMIA: ICD-10-CM

## 2021-03-22 DIAGNOSIS — Z12.31 ENCOUNTER FOR SCREENING MAMMOGRAM FOR MALIGNANT NEOPLASM OF BREAST: ICD-10-CM

## 2021-03-22 DIAGNOSIS — G43.009 MIGRAINE WITHOUT AURA AND WITHOUT STATUS MIGRAINOSUS, NOT INTRACTABLE: ICD-10-CM

## 2021-03-22 DIAGNOSIS — E03.9 ACQUIRED HYPOTHYROIDISM: ICD-10-CM

## 2021-03-22 DIAGNOSIS — E72.12 MTHFR (METHYLENE THF REDUCTASE) DEFICIENCY AND HOMOCYSTINURIA (HCC): ICD-10-CM

## 2021-03-22 DIAGNOSIS — F41.1 ANXIETY STATE: ICD-10-CM

## 2021-03-22 DIAGNOSIS — Z00.00 ROUTINE GENERAL MEDICAL EXAMINATION AT A HEALTH CARE FACILITY: Primary | ICD-10-CM

## 2021-03-22 DIAGNOSIS — E72.11 MTHFR (METHYLENE THF REDUCTASE) DEFICIENCY AND HOMOCYSTINURIA (HCC): ICD-10-CM

## 2021-03-22 PROCEDURE — 99396 PREV VISIT EST AGE 40-64: CPT | Performed by: NURSE PRACTITIONER

## 2021-03-22 PROCEDURE — 3075F SYST BP GE 130 - 139MM HG: CPT | Performed by: NURSE PRACTITIONER

## 2021-03-22 PROCEDURE — 3079F DIAST BP 80-89 MM HG: CPT | Performed by: NURSE PRACTITIONER

## 2021-03-22 PROCEDURE — 3008F BODY MASS INDEX DOCD: CPT | Performed by: NURSE PRACTITIONER

## 2021-03-22 RX ORDER — BUPROPION HYDROCHLORIDE 150 MG/1
TABLET ORAL
Qty: 90 TABLET | Refills: 3 | Status: SHIPPED | OUTPATIENT
Start: 2021-03-22

## 2021-03-22 RX ORDER — LEVOTHYROXINE SODIUM 0.15 MG/1
137 TABLET ORAL
Qty: 90 TABLET | Refills: 0 | OUTPATIENT
Start: 2021-03-22

## 2021-03-22 NOTE — PROGRESS NOTES
Elvia Haley is a 41year old female who presents for a complete physical exam:       Patient complains of:     Hypothyroid  levothryoxine 150mcg  Having labs done next week    Anxiety  Stable with sertraline and wellbutrin XL    Irregular mensus   S 30 capsule 0   • spironolactone 50 MG Oral Tab Take 1 tablet (50 mg total) by mouth daily.  90 tablet 1   • Dapsone (ACZONE) 5 % External Gel Apply topically to face every AM 60 g 2   • Tretinoin 0.05 % External Cream Apply small rice sz amount to face Q PM dx age 42-54's   • Breast Cancer Paternal Aunt 61        post menopause 64-70 yrs old   • Hypertension Mother    • Asthma Son    • No Known Problems Maternal Grandfather    • No Known Problems Paternal Grandmother    • No Known Problems Son    • No Known P Wt 184 lb (83.5 kg)   LMP 03/21/2021   BMI 27.98 kg/m²   Body mass index is 27.98 kg/m².    GENERAL: well developed, well nourished,in no apparent distress  SKIN: no rashes,no suspicious lesions  HEENT: atraumatic, normocephalic,ears and throat are clear

## 2021-03-27 ENCOUNTER — LAB ENCOUNTER (OUTPATIENT)
Dept: LAB | Age: 46
End: 2021-03-27
Attending: NURSE PRACTITIONER
Payer: COMMERCIAL

## 2021-03-27 DIAGNOSIS — Z00.00 ROUTINE GENERAL MEDICAL EXAMINATION AT A HEALTH CARE FACILITY: ICD-10-CM

## 2021-03-27 DIAGNOSIS — Z13.220 SCREENING FOR LIPID DISORDERS: ICD-10-CM

## 2021-03-27 DIAGNOSIS — Z13.29 SCREENING FOR THYROID DISORDER: ICD-10-CM

## 2021-03-27 DIAGNOSIS — Z13.228 SCREENING FOR METABOLIC DISORDER: ICD-10-CM

## 2021-03-27 DIAGNOSIS — E53.8 VITAMIN B12 DEFICIENCY: ICD-10-CM

## 2021-03-27 DIAGNOSIS — Z13.0 SCREENING FOR DISORDER OF BLOOD AND BLOOD-FORMING ORGANS: ICD-10-CM

## 2021-03-27 DIAGNOSIS — E55.9 VITAMIN D DEFICIENCY: ICD-10-CM

## 2021-03-27 DIAGNOSIS — Z51.81 ENCOUNTER FOR THERAPEUTIC DRUG MONITORING: ICD-10-CM

## 2021-03-27 LAB
ALBUMIN SERPL-MCNC: 3.7 G/DL (ref 3.4–5)
ALBUMIN/GLOB SERPL: 0.9 {RATIO} (ref 1–2)
ALP LIVER SERPL-CCNC: 71 U/L
ALT SERPL-CCNC: 40 U/L
ANION GAP SERPL CALC-SCNC: 8 MMOL/L (ref 0–18)
AST SERPL-CCNC: 19 U/L (ref 15–37)
BASOPHILS # BLD AUTO: 0.04 X10(3) UL (ref 0–0.2)
BASOPHILS NFR BLD AUTO: 0.6 %
BILIRUB SERPL-MCNC: 0.4 MG/DL (ref 0.1–2)
BUN BLD-MCNC: 14 MG/DL (ref 7–18)
BUN/CREAT SERPL: 16.3 (ref 10–20)
CALCIUM BLD-MCNC: 9.7 MG/DL (ref 8.5–10.1)
CHLORIDE SERPL-SCNC: 106 MMOL/L (ref 98–112)
CHOLEST SMN-MCNC: 172 MG/DL (ref ?–200)
CO2 SERPL-SCNC: 24 MMOL/L (ref 21–32)
CREAT BLD-MCNC: 0.86 MG/DL
DEPRECATED RDW RBC AUTO: 46.6 FL (ref 35.1–46.3)
EOSINOPHIL # BLD AUTO: 0.2 X10(3) UL (ref 0–0.7)
EOSINOPHIL NFR BLD AUTO: 3.1 %
ERYTHROCYTE [DISTWIDTH] IN BLOOD BY AUTOMATED COUNT: 13 % (ref 11–15)
GLOBULIN PLAS-MCNC: 3.9 G/DL (ref 2.8–4.4)
GLUCOSE BLD-MCNC: 84 MG/DL (ref 70–99)
HCT VFR BLD AUTO: 45.9 %
HDLC SERPL-MCNC: 79 MG/DL (ref 40–59)
HGB BLD-MCNC: 14.9 G/DL
IMM GRANULOCYTES # BLD AUTO: 0.06 X10(3) UL (ref 0–1)
IMM GRANULOCYTES NFR BLD: 0.9 %
LDLC SERPL CALC-MCNC: 79 MG/DL (ref ?–100)
LYMPHOCYTES # BLD AUTO: 1.67 X10(3) UL (ref 1–4)
LYMPHOCYTES NFR BLD AUTO: 25.9 %
M PROTEIN MFR SERPL ELPH: 7.6 G/DL (ref 6.4–8.2)
MCH RBC QN AUTO: 32 PG (ref 26–34)
MCHC RBC AUTO-ENTMCNC: 32.5 G/DL (ref 31–37)
MCV RBC AUTO: 98.7 FL
MONOCYTES # BLD AUTO: 0.65 X10(3) UL (ref 0.1–1)
MONOCYTES NFR BLD AUTO: 10.1 %
NEUTROPHILS # BLD AUTO: 3.84 X10 (3) UL (ref 1.5–7.7)
NEUTROPHILS # BLD AUTO: 3.84 X10(3) UL (ref 1.5–7.7)
NEUTROPHILS NFR BLD AUTO: 59.4 %
NONHDLC SERPL-MCNC: 93 MG/DL (ref ?–130)
OSMOLALITY SERPL CALC.SUM OF ELEC: 286 MOSM/KG (ref 275–295)
PATIENT FASTING Y/N/NP: YES
PATIENT FASTING Y/N/NP: YES
PLATELET # BLD AUTO: 301 10(3)UL (ref 150–450)
POTASSIUM SERPL-SCNC: 3.9 MMOL/L (ref 3.5–5.1)
RBC # BLD AUTO: 4.65 X10(6)UL
SODIUM SERPL-SCNC: 138 MMOL/L (ref 136–145)
TRIGL SERPL-MCNC: 70 MG/DL (ref 30–149)
TSI SER-ACNC: 1.15 MIU/ML (ref 0.36–3.74)
VIT B12 SERPL-MCNC: 586 PG/ML (ref 193–986)
VIT D+METAB SERPL-MCNC: 26.3 NG/ML (ref 30–100)
VLDLC SERPL CALC-MCNC: 14 MG/DL (ref 0–30)
WBC # BLD AUTO: 6.5 X10(3) UL (ref 4–11)

## 2021-03-27 PROCEDURE — 85025 COMPLETE CBC W/AUTO DIFF WBC: CPT

## 2021-03-27 PROCEDURE — 36415 COLL VENOUS BLD VENIPUNCTURE: CPT

## 2021-03-27 PROCEDURE — 84443 ASSAY THYROID STIM HORMONE: CPT

## 2021-03-27 PROCEDURE — 82306 VITAMIN D 25 HYDROXY: CPT

## 2021-03-27 PROCEDURE — 80061 LIPID PANEL: CPT

## 2021-03-27 PROCEDURE — 82607 VITAMIN B-12: CPT

## 2021-03-27 PROCEDURE — 80053 COMPREHEN METABOLIC PANEL: CPT

## 2021-03-28 NOTE — PROGRESS NOTES
Vitamin b12 is normal (don't need to do vitamin b12 injections)   Low vitamin D level, please start taking ergocalciferol 50,000 U q week 12 weeks (please order 12 no refill).  Then start daily maintenance vitamin D 2000 Units

## 2021-03-29 ENCOUNTER — HOSPITAL ENCOUNTER (OUTPATIENT)
Dept: MAMMOGRAPHY | Age: 46
Discharge: HOME | End: 2021-03-29
Attending: NURSE PRACTITIONER
Payer: COMMERCIAL

## 2021-03-29 DIAGNOSIS — Z12.31 ENCOUNTER FOR SCREENING MAMMOGRAM FOR MALIGNANT NEOPLASM OF BREAST: ICD-10-CM

## 2021-03-29 PROCEDURE — 77067 SCR MAMMO BI INCL CAD: CPT | Performed by: NURSE PRACTITIONER

## 2021-03-29 PROCEDURE — 77063 BREAST TOMOSYNTHESIS BI: CPT | Performed by: NURSE PRACTITIONER

## 2021-04-05 RX ORDER — LEVOTHYROXINE SODIUM 0.15 MG/1
137 TABLET ORAL
Qty: 90 TABLET | Refills: 1 | Status: SHIPPED | OUTPATIENT
Start: 2021-04-05 | End: 2021-10-11

## 2021-04-11 NOTE — PROGRESS NOTES
HISTORY OF PRESENT ILLNESS  Patient presents with:  Weight Check: down 1 lb    Sagrario Villeda is a 41year old female here for follow up with medical weight loss program for the treatment of overweight, obesity, or morbid obesity.      Down 1 lbs  Compl conjunctiva pink, sclera non icteric, PERRLA  NECK: supple, no adenopathy  LUNGS: CTA in all fields, breathing non labored  CARDIO: RRR without murmur  GI: +BS, NT/ND, no masses or HSM  EXTREMITIES: no cyanosis, no clubbing, no edema  PSYCH: pleasant, coop times daily. , Disp: 180 tablet, Rfl: 0  spironolactone 50 MG Oral Tab, Take 1 tablet (50 mg total) by mouth daily. , Disp: 90 tablet, Rfl: 1  Dapsone (ACZONE) 5 % External Gel, Apply topically to face every AM, Disp: 60 g, Rfl: 2  Tretinoin 0.05 % External past, diethylproion 75mg   · Reviewed labs   · Continue with vitamin d OTC   · Gave ideas to help with lunches, and frozen meals (eating well) that she can get from the grocery store  · Hypothyroid- stable, continue with current medication regimen, managed

## 2021-04-12 ENCOUNTER — OFFICE VISIT (OUTPATIENT)
Dept: INTERNAL MEDICINE CLINIC | Facility: CLINIC | Age: 46
End: 2021-04-12

## 2021-04-12 VITALS
BODY MASS INDEX: 27.28 KG/M2 | WEIGHT: 180 LBS | HEART RATE: 60 BPM | DIASTOLIC BLOOD PRESSURE: 84 MMHG | RESPIRATION RATE: 16 BRPM | SYSTOLIC BLOOD PRESSURE: 122 MMHG | HEIGHT: 68 IN

## 2021-04-12 DIAGNOSIS — E03.9 ACQUIRED HYPOTHYROIDISM: ICD-10-CM

## 2021-04-12 DIAGNOSIS — E78.5 DYSLIPIDEMIA: ICD-10-CM

## 2021-04-12 DIAGNOSIS — Z51.81 ENCOUNTER FOR THERAPEUTIC DRUG MONITORING: Primary | ICD-10-CM

## 2021-04-12 DIAGNOSIS — E55.9 VITAMIN D DEFICIENCY: ICD-10-CM

## 2021-04-12 DIAGNOSIS — E66.3 OVERWEIGHT (BMI 25.0-29.9): ICD-10-CM

## 2021-04-12 DIAGNOSIS — F41.1 ANXIETY STATE: ICD-10-CM

## 2021-04-12 DIAGNOSIS — E53.8 VITAMIN B12 DEFICIENCY: ICD-10-CM

## 2021-04-12 PROCEDURE — 3074F SYST BP LT 130 MM HG: CPT | Performed by: NURSE PRACTITIONER

## 2021-04-12 PROCEDURE — 99213 OFFICE O/P EST LOW 20 MIN: CPT | Performed by: NURSE PRACTITIONER

## 2021-04-12 PROCEDURE — 3079F DIAST BP 80-89 MM HG: CPT | Performed by: NURSE PRACTITIONER

## 2021-04-12 PROCEDURE — 3008F BODY MASS INDEX DOCD: CPT | Performed by: NURSE PRACTITIONER

## 2021-04-12 RX ORDER — PHENTERMINE HYDROCHLORIDE 37.5 MG/1
37.5 CAPSULE ORAL EVERY MORNING
Qty: 30 CAPSULE | Refills: 0 | Status: SHIPPED | OUTPATIENT
Start: 2021-04-12 | End: 2021-07-09

## 2021-04-12 RX ORDER — PHENTERMINE HYDROCHLORIDE 15 MG/1
15 CAPSULE ORAL EVERY MORNING
Qty: 30 CAPSULE | Refills: 0 | Status: CANCELLED | OUTPATIENT
Start: 2021-04-12

## 2021-04-12 NOTE — PATIENT INSTRUCTIONS
We are here to support you with weight loss, but please remember that you still need your primary care provider for your routine health maintenance.       PLAN:  Will increase phentermine 37.5mg and topamax 50mg  Check out eating well- frozen meals, try to per week                Activity level: not very active (can't count exercise towards calorie number per day)                   ** Daily INPUT> Look at nutrition section-- \"nutrients\" and it will break down your macros for the day (ie.  Protein, carbs, fi

## 2021-05-13 RX ORDER — SUMATRIPTAN 100 MG/1
TABLET, FILM COATED ORAL
Qty: 18 TABLET | Refills: 1 | Status: SHIPPED | OUTPATIENT
Start: 2021-05-13 | End: 2021-12-03

## 2021-05-13 NOTE — TELEPHONE ENCOUNTER
Last Ov: 3/22/21, SD, CPE  Last labs: Vit D, CBC, CMP, Lipid, TSH w Ref, Vit B12 3/27/21  Last Rx: sumatriptan 100mg, #18, 1R 9/8/20    Future Appointments   Date Time Provider Melissa Ortiz   7/26/2021 12:30 PM Moon Kemp MD [address] [facility]

## 2021-06-06 DIAGNOSIS — Z51.81 ENCOUNTER FOR THERAPEUTIC DRUG MONITORING: ICD-10-CM

## 2021-06-07 NOTE — TELEPHONE ENCOUNTER
Requesting topiramate 50 mg  LOV: 4/12/21  RTC: one month  Last Relevant Labs: na  Filled: 3/15/21 #180 with 0 refills    Future Appointments   Date Time Provider Melissa Ortiz   7/26/2021 12:30 PM Bryan Catherine MD [facility]     Pt cancelled

## 2021-06-08 ENCOUNTER — TELEPHONE (OUTPATIENT)
Dept: INTERNAL MEDICINE CLINIC | Facility: CLINIC | Age: 46
End: 2021-06-08

## 2021-06-08 RX ORDER — TOPIRAMATE 50 MG/1
TABLET, FILM COATED ORAL
Qty: 180 TABLET | Refills: 0 | Status: SHIPPED | OUTPATIENT
Start: 2021-06-08 | End: 2021-07-09

## 2021-06-08 NOTE — TELEPHONE ENCOUNTER
No appt made so far - I left message for patient she is overdue for  Visit and to schedule online or call us for help. I sent her my chart previously that she did not read.     Future Appointments   Date Time Provider Melissa Ortiz   7/26/2021 12:30 PM

## 2021-06-09 ENCOUNTER — PATIENT MESSAGE (OUTPATIENT)
Dept: INTERNAL MEDICINE CLINIC | Facility: CLINIC | Age: 46
End: 2021-06-09

## 2021-06-09 DIAGNOSIS — E66.3 PATIENT OVERWEIGHT: Primary | ICD-10-CM

## 2021-06-09 DIAGNOSIS — E66.09 CLASS 1 OBESITY DUE TO EXCESS CALORIES WITHOUT SERIOUS COMORBIDITY WITH BODY MASS INDEX (BMI) OF 30.0 TO 30.9 IN ADULT: ICD-10-CM

## 2021-06-10 NOTE — TELEPHONE ENCOUNTER
From: Maninder Rubio  To:  JÚNIOR Trujillo  Sent: 6/9/2021 8:39 PM CDT  Subject: Referral Request    Yoanna  The weight loss clinic is asking for a renewal of the referral. When you have time, could your office please put in a referral request?    Th

## 2021-06-20 RX ORDER — ERGOCALCIFEROL 1.25 MG/1
CAPSULE ORAL
Qty: 12 CAPSULE | Refills: 0 | OUTPATIENT
Start: 2021-06-20

## 2021-06-20 NOTE — TELEPHONE ENCOUNTER
Requesting vitamin D  LOV: 4/12/21  RTC: one month  Last Relevant Labs: 3/27/21  Filled: 3/29/21 #12 with 0 refills    Therapy completed - denied refill

## 2021-07-09 ENCOUNTER — TELEMEDICINE (OUTPATIENT)
Dept: INTERNAL MEDICINE CLINIC | Facility: CLINIC | Age: 46
End: 2021-07-09

## 2021-07-09 VITALS — WEIGHT: 181 LBS | BODY MASS INDEX: 28 KG/M2

## 2021-07-09 DIAGNOSIS — E66.3 OVERWEIGHT (BMI 25.0-29.9): ICD-10-CM

## 2021-07-09 DIAGNOSIS — E03.9 ACQUIRED HYPOTHYROIDISM: ICD-10-CM

## 2021-07-09 DIAGNOSIS — Z51.81 ENCOUNTER FOR THERAPEUTIC DRUG MONITORING: Primary | ICD-10-CM

## 2021-07-09 DIAGNOSIS — E55.9 VITAMIN D DEFICIENCY: ICD-10-CM

## 2021-07-09 DIAGNOSIS — E78.5 DYSLIPIDEMIA: ICD-10-CM

## 2021-07-09 PROCEDURE — 99213 OFFICE O/P EST LOW 20 MIN: CPT | Performed by: NURSE PRACTITIONER

## 2021-07-09 RX ORDER — PHENTERMINE HYDROCHLORIDE 37.5 MG/1
37.5 CAPSULE ORAL EVERY MORNING
Qty: 30 CAPSULE | Refills: 1 | Status: SHIPPED | OUTPATIENT
Start: 2021-07-09 | End: 2021-10-01

## 2021-07-09 NOTE — PROGRESS NOTES
[facility] WEIGHT MANAGEMENT VIRTUAL ENCOUNTER     Sarah Zhou verbally consents to a Virtual/Telephone Check-In service on 07/09/21   Patient understands and accepts financial responsibility for any deductible, co-insurance and/or co-pays asso in full sentences comfortably   SKIN: warm, pink, dry without rashes to exposed area   EYES: conjunctiva pink  HEENT: atraumatic, normocephalic  LUNGS: normal work of breathing, non labored  CARDIO: normal work, no exertion  EXTREMITIES: no cyanosis, no cl Oral Tab, TAKE 1 TAB BY MOUTH AT ONSET OF HEADACHE, REPEAT DOSE IN 2 HRS AS NEEDED TO A MAX OF 2 TABS IN 24HRS, Disp: 18 tablet, Rfl: 1  LEVOTHYROXINE SODIUM 150 MCG Oral Tab, TAKE 1 TABLET (150 MCG TOTAL) BY MOUTH ONCE DAILY. , Disp: 90 tablet, Rfl: 1  erg doing a lot)  · Will trial wegovy 0.25mg weekly X 4 weeks and increase 0.5mg weekly   · --advised of side effects and adverse effects of this medication  ?  Contradictions: had taken phentermine in the past, diethylproion 75mg   · Reviewed labs   · Continue limitations of this visit as no physical exam could be performed. Every conscious effort was taken to allow for sufficient and adequate time. This billing was spent on reviewing labs, medications, radiology tests and decision making.   Appropriate medical

## 2021-07-09 NOTE — PATIENT INSTRUCTIONS
We are here to support you with weight loss, but please remember that you still need your primary care provider for your routine health maintenance.       PLAN:  Continue with phentermine, stop topamax   Will start wegovy 0.25mg weekly X 4 weeks and then in very active (can't count exercise towards calorie number per day)                   ** Daily INPUT> Look at nutrition section-- \"nutrients\" and it will break down your macros for the day (ie. Protein, carbs, fibers, sugars and fats).  Try to stay within t

## 2021-07-25 ENCOUNTER — PATIENT MESSAGE (OUTPATIENT)
Dept: INTERNAL MEDICINE CLINIC | Facility: CLINIC | Age: 46
End: 2021-07-25

## 2021-07-25 DIAGNOSIS — Z51.81 ENCOUNTER FOR THERAPEUTIC DRUG MONITORING: ICD-10-CM

## 2021-07-25 DIAGNOSIS — E66.3 OVERWEIGHT (BMI 25.0-29.9): Primary | ICD-10-CM

## 2021-07-26 RX ORDER — SEMAGLUTIDE 0.25 MG/.5ML
0.25 INJECTION, SOLUTION SUBCUTANEOUS WEEKLY
Qty: 2 ML | Refills: 0 | Status: SHIPPED | OUTPATIENT
Start: 2021-07-26 | End: 2021-08-17

## 2021-07-26 NOTE — TELEPHONE ENCOUNTER
From: Melvina Hollins  To: JÚNIOR Salmon  Sent: 7/25/2021 9:43 PM CDT  Subject: Visit Follow-up Question    Sonali Salas,    I never received an email or phone call from the [facility] about [facility]DIANA BUSTAMANTE. I even looked in my junk/spam folder.  Do

## 2021-07-26 NOTE — TELEPHONE ENCOUNTER
Last visit 7/9/21 telemedicine.   It is noted to begin [facility]DIANA BUSTAMANTE - pended to [place] if you wish her to start

## 2021-09-12 DIAGNOSIS — Z51.81 ENCOUNTER FOR THERAPEUTIC DRUG MONITORING: ICD-10-CM

## 2021-09-13 RX ORDER — TOPIRAMATE 50 MG/1
TABLET, FILM COATED ORAL
Qty: 180 TABLET | Refills: 0 | OUTPATIENT
Start: 2021-09-13

## 2021-09-13 NOTE — TELEPHONE ENCOUNTER
Requesting topamax  LOV: televisit 7/9/21  RTC: unkniwn  Filled: 6/8/21 #180 with 0 refills    Future Appointments   Date Time Provider Melissa Stephensisti   1/26/2022 12:45 PM Malcolm Faust MD [place] [address]         Per note patient stopped topamax.  Re

## 2021-10-01 ENCOUNTER — OFFICE VISIT (OUTPATIENT)
Dept: INTERNAL MEDICINE CLINIC | Facility: CLINIC | Age: 46
End: 2021-10-01

## 2021-10-01 VITALS
TEMPERATURE: 97 F | HEIGHT: 66.93 IN | WEIGHT: 190.19 LBS | BODY MASS INDEX: 29.85 KG/M2 | OXYGEN SATURATION: 97 % | DIASTOLIC BLOOD PRESSURE: 74 MMHG | HEART RATE: 71 BPM | RESPIRATION RATE: 16 BRPM | SYSTOLIC BLOOD PRESSURE: 126 MMHG

## 2021-10-01 DIAGNOSIS — R25.1 TREMOR: ICD-10-CM

## 2021-10-01 DIAGNOSIS — M54.9 BACK PAIN WITH HISTORY OF SPINAL SURGERY: ICD-10-CM

## 2021-10-01 DIAGNOSIS — I15.8 OTHER SECONDARY HYPERTENSION: ICD-10-CM

## 2021-10-01 DIAGNOSIS — M51.26 LUMBAR DISC HERNIATION: ICD-10-CM

## 2021-10-01 DIAGNOSIS — Z98.890 BACK PAIN WITH HISTORY OF SPINAL SURGERY: ICD-10-CM

## 2021-10-01 DIAGNOSIS — E03.9 ACQUIRED HYPOTHYROIDISM: ICD-10-CM

## 2021-10-01 DIAGNOSIS — T38.0X5S ADVERSE EFFECT OF GLUCOCORTICOID OR SYNTHETIC ANALOGUE, SEQUELA: Primary | ICD-10-CM

## 2021-10-01 DIAGNOSIS — R79.89 ABNORMAL CORTISOL LEVEL: ICD-10-CM

## 2021-10-01 PROCEDURE — 3078F DIAST BP <80 MM HG: CPT | Performed by: FAMILY MEDICINE

## 2021-10-01 PROCEDURE — 3008F BODY MASS INDEX DOCD: CPT | Performed by: FAMILY MEDICINE

## 2021-10-01 PROCEDURE — 99214 OFFICE O/P EST MOD 30 MIN: CPT | Performed by: FAMILY MEDICINE

## 2021-10-01 PROCEDURE — 3074F SYST BP LT 130 MM HG: CPT | Performed by: FAMILY MEDICINE

## 2021-10-01 RX ORDER — CLONIDINE 0.1 MG/24H
2 PATCH, EXTENDED RELEASE TRANSDERMAL WEEKLY
COMMUNITY
Start: 2021-09-23 | End: 2021-10-15

## 2021-10-01 RX ORDER — PROPRANOLOL HYDROCHLORIDE 20 MG/1
20 TABLET ORAL 3 TIMES DAILY PRN
Qty: 60 TABLET | Refills: 0 | Status: SHIPPED | OUTPATIENT
Start: 2021-10-01 | End: 2022-01-20

## 2021-10-01 NOTE — PROGRESS NOTES
Amber Hernandez  7/1/1975    Patient presents with: Adverse Reaction: ES rm  - 2 Reaction to epidural injection - Puffy face, bp rise, and rash on chest from 9/13/21.       HPI:   Amber Hernandez is a 40year old female who presents for evaluation of • aspirin 81 MG Oral Tab EC Take 81 mg by mouth daily. • Folic Acid 20 MG Oral Cap Take  by mouth daily. • cloNIDine 0.1 MG/24HR Transdermal Patch Weekly Place 2 patches onto the skin once a week.      • Desonide 0.05 % External Cream Apply to AA fever, chills, chest pain, SOB, abdominal pain, N/V.      EXAM:   /74 (BP Location: Left arm, Patient Position: Sitting, Cuff Size: adult)   Pulse 71   Temp 97.4 °F (36.3 °C) (Temporal)   Resp 16   Ht 5' 6.93\" (1.7 m)   Wt 190 lb 3.2 oz (86.3 kg)   L T4; Future    Back pain with history of spinal surgery, Lumbar disc herniation  Was treated with medrol dose pack and LESI as above. Currently pain not sig. CTM. All questions were answered and the patient agrees with the plan.      Thank you,  Carmela Acuña

## 2021-10-11 RX ORDER — LEVOTHYROXINE SODIUM 0.15 MG/1
137 TABLET ORAL
Qty: 90 TABLET | Refills: 0 | Status: SHIPPED | OUTPATIENT
Start: 2021-10-11 | End: 2021-10-20 | Stop reason: DRUGHIGH

## 2021-10-12 ENCOUNTER — LAB ENCOUNTER (OUTPATIENT)
Dept: LAB | Age: 46
End: 2021-10-12
Attending: FAMILY MEDICINE
Payer: COMMERCIAL

## 2021-10-12 DIAGNOSIS — R79.89 ABNORMAL CORTISOL LEVEL: ICD-10-CM

## 2021-10-12 DIAGNOSIS — T38.0X5S ADVERSE EFFECT OF GLUCOCORTICOID OR SYNTHETIC ANALOGUE, SEQUELA: ICD-10-CM

## 2021-10-12 DIAGNOSIS — E03.9 ACQUIRED HYPOTHYROIDISM: ICD-10-CM

## 2021-10-12 PROCEDURE — 82533 TOTAL CORTISOL: CPT

## 2021-10-12 PROCEDURE — 84439 ASSAY OF FREE THYROXINE: CPT

## 2021-10-12 PROCEDURE — 84443 ASSAY THYROID STIM HORMONE: CPT

## 2021-10-12 PROCEDURE — 82024 ASSAY OF ACTH: CPT

## 2021-10-12 PROCEDURE — 36415 COLL VENOUS BLD VENIPUNCTURE: CPT

## 2021-10-15 ENCOUNTER — OFFICE VISIT (OUTPATIENT)
Dept: INTERNAL MEDICINE CLINIC | Facility: CLINIC | Age: 46
End: 2021-10-15

## 2021-10-15 VITALS
TEMPERATURE: 97 F | HEART RATE: 64 BPM | OXYGEN SATURATION: 98 % | DIASTOLIC BLOOD PRESSURE: 86 MMHG | SYSTOLIC BLOOD PRESSURE: 134 MMHG | HEIGHT: 66.93 IN | WEIGHT: 188.63 LBS | BODY MASS INDEX: 29.61 KG/M2 | RESPIRATION RATE: 16 BRPM

## 2021-10-15 DIAGNOSIS — E03.9 ACQUIRED HYPOTHYROIDISM: ICD-10-CM

## 2021-10-15 DIAGNOSIS — T38.0X5S ADVERSE EFFECT OF GLUCOCORTICOID OR SYNTHETIC ANALOGUE, SEQUELA: Primary | ICD-10-CM

## 2021-10-15 DIAGNOSIS — I15.8 OTHER SECONDARY HYPERTENSION: ICD-10-CM

## 2021-10-15 DIAGNOSIS — R79.89 ABNORMAL CORTISOL LEVEL: ICD-10-CM

## 2021-10-15 DIAGNOSIS — R25.1 TREMOR: ICD-10-CM

## 2021-10-15 PROCEDURE — 3075F SYST BP GE 130 - 139MM HG: CPT | Performed by: FAMILY MEDICINE

## 2021-10-15 PROCEDURE — 3079F DIAST BP 80-89 MM HG: CPT | Performed by: FAMILY MEDICINE

## 2021-10-15 PROCEDURE — 3008F BODY MASS INDEX DOCD: CPT | Performed by: FAMILY MEDICINE

## 2021-10-15 PROCEDURE — 99213 OFFICE O/P EST LOW 20 MIN: CPT | Performed by: FAMILY MEDICINE

## 2021-10-15 NOTE — PROGRESS NOTES
Renate Mcpherson  7/1/1975    Patient presents with: Follow - Up: ES rm - 2 - 1mo. f/u reaction to steroid medication.  Slightly elevated BP and       HPI:   Renate Mcpherson is a 40year old female who presents for follow-up on her elevated BP, daviei Medical History:   Diagnosis Date   • Acne    • Anxiety state, unspecified    • Hypothyroid    • Migraine    • MTHFR (methylene THF reductase) deficiency and homocystinuria (HCC)    • OTHER DISEASES     mthfr clotting disorder   • Other malaise and fatigue rashes  EYES: conjunctiva are clear  HEENT: atraumatic, normocephalicar  NECK: supple  LUNGS: clear to auscultation  CARDIO: RRR without murmur  GI: good BS's,no masses, HSM or tenderness  NEURO: improvement but still persistent hand tremor    ASSESSMENT A

## 2021-11-22 ENCOUNTER — LAB ENCOUNTER (OUTPATIENT)
Dept: LAB | Age: 46
End: 2021-11-22
Attending: INTERNAL MEDICINE
Payer: COMMERCIAL

## 2021-11-22 DIAGNOSIS — E03.9 HYPOTHYROIDISM (ACQUIRED): ICD-10-CM

## 2021-11-22 DIAGNOSIS — R79.89 ABNORMAL CORTISOL LEVEL: ICD-10-CM

## 2021-11-22 PROCEDURE — 84481 FREE ASSAY (FT-3): CPT

## 2021-11-22 PROCEDURE — 84146 ASSAY OF PROLACTIN: CPT

## 2021-11-22 PROCEDURE — 84305 ASSAY OF SOMATOMEDIN: CPT

## 2021-11-22 PROCEDURE — 36415 COLL VENOUS BLD VENIPUNCTURE: CPT

## 2021-11-22 PROCEDURE — 84439 ASSAY OF FREE THYROXINE: CPT

## 2021-11-22 PROCEDURE — 84443 ASSAY THYROID STIM HORMONE: CPT

## 2021-11-22 PROCEDURE — 82024 ASSAY OF ACTH: CPT

## 2021-11-22 PROCEDURE — 82533 TOTAL CORTISOL: CPT

## 2021-12-03 RX ORDER — SUMATRIPTAN 100 MG/1
TABLET, FILM COATED ORAL
Qty: 18 TABLET | Refills: 1 | Status: SHIPPED | OUTPATIENT
Start: 2021-12-03

## 2021-12-29 ENCOUNTER — PATIENT MESSAGE (OUTPATIENT)
Dept: INTERNAL MEDICINE CLINIC | Facility: CLINIC | Age: 46
End: 2021-12-29

## 2021-12-29 DIAGNOSIS — L70.0 ACNE VULGARIS: Primary | ICD-10-CM

## 2021-12-30 ENCOUNTER — LAB ENCOUNTER (OUTPATIENT)
Dept: LAB | Age: 46
End: 2021-12-30
Attending: DERMATOLOGY
Payer: COMMERCIAL

## 2021-12-30 DIAGNOSIS — L70.0 ACNE VULGARIS: ICD-10-CM

## 2021-12-30 DIAGNOSIS — Z79.899 HIGH RISK MEDICATION USE: ICD-10-CM

## 2021-12-30 LAB
B-HCG SERPL-ACNC: <1 MIU/ML
BASOPHILS # BLD AUTO: 0.08 X10(3) UL (ref 0–0.2)
BASOPHILS NFR BLD AUTO: 1.2 %
CHOLEST SERPL-MCNC: 243 MG/DL (ref ?–200)
EOSINOPHIL # BLD AUTO: 0.12 X10(3) UL (ref 0–0.7)
EOSINOPHIL NFR BLD AUTO: 1.8 %
ERYTHROCYTE [DISTWIDTH] IN BLOOD BY AUTOMATED COUNT: 11.9 %
FASTING PATIENT LIPID ANSWER: YES
HCT VFR BLD AUTO: 45 %
HDLC SERPL-MCNC: 65 MG/DL (ref 40–59)
HGB BLD-MCNC: 14.6 G/DL
IMM GRANULOCYTES # BLD AUTO: 0.06 X10(3) UL (ref 0–1)
IMM GRANULOCYTES NFR BLD: 0.9 %
LDLC SERPL CALC-MCNC: 157 MG/DL (ref ?–100)
LYMPHOCYTES # BLD AUTO: 2.18 X10(3) UL (ref 1–4)
LYMPHOCYTES NFR BLD AUTO: 33.2 %
MCH RBC QN AUTO: 32.2 PG (ref 26–34)
MCHC RBC AUTO-ENTMCNC: 32.4 G/DL (ref 31–37)
MCV RBC AUTO: 99.3 FL
MONOCYTES # BLD AUTO: 0.71 X10(3) UL (ref 0.1–1)
MONOCYTES NFR BLD AUTO: 10.8 %
NEUTROPHILS # BLD AUTO: 3.41 X10 (3) UL (ref 1.5–7.7)
NEUTROPHILS # BLD AUTO: 3.41 X10(3) UL (ref 1.5–7.7)
NEUTROPHILS NFR BLD AUTO: 52.1 %
NONHDLC SERPL-MCNC: 178 MG/DL (ref ?–130)
PLATELET # BLD AUTO: 371 10(3)UL (ref 150–450)
RBC # BLD AUTO: 4.53 X10(6)UL
TRIGL SERPL-MCNC: 121 MG/DL (ref 30–149)
VLDLC SERPL CALC-MCNC: 23 MG/DL (ref 0–30)
WBC # BLD AUTO: 6.6 X10(3) UL (ref 4–11)

## 2021-12-30 PROCEDURE — 36415 COLL VENOUS BLD VENIPUNCTURE: CPT

## 2021-12-30 PROCEDURE — 84702 CHORIONIC GONADOTROPIN TEST: CPT

## 2021-12-30 PROCEDURE — 85025 COMPLETE CBC W/AUTO DIFF WBC: CPT

## 2021-12-30 PROCEDURE — 80061 LIPID PANEL: CPT

## 2022-01-03 NOTE — PROGRESS NOTES
1) Please register pt into Ipledge   2) Order Quat HCG  3) Inform of when to obtain next pregnancy test and f/u to clinic visit 1-2 days after.       Please inform pt cholesterol is elevated and will cont to monitor

## 2022-01-03 NOTE — PROGRESS NOTES
1) Pt registered into iPledge  2) HCG ordered  3) Pt to obtain next pregnancy test after 02/02/22, pt to f/u 02/07/22 with [facility]. Pt verbalized understanding to complete HCG 1-2 days before apt.

## 2022-03-12 ENCOUNTER — LAB ENCOUNTER (OUTPATIENT)
Dept: LAB | Age: 47
End: 2022-03-12
Attending: INTERNAL MEDICINE
Payer: COMMERCIAL

## 2022-03-12 DIAGNOSIS — R79.89 ABNORMAL CORTISOL LEVEL: ICD-10-CM

## 2022-03-12 DIAGNOSIS — E03.9 HYPOTHYROIDISM (ACQUIRED): ICD-10-CM

## 2022-03-12 DIAGNOSIS — Z79.899 HIGH RISK MEDICATION USE: ICD-10-CM

## 2022-03-12 DIAGNOSIS — E03.9 ACQUIRED HYPOTHYROIDISM: ICD-10-CM

## 2022-03-12 LAB
B-HCG SERPL-ACNC: <1 MIU/ML
CORTIS SERPL-MCNC: 7 UG/DL
T3FREE SERPL-MCNC: 2.78 PG/ML (ref 2.4–4.2)
T4 FREE SERPL-MCNC: 1.4 NG/DL (ref 0.8–1.7)

## 2022-03-12 PROCEDURE — 84439 ASSAY OF FREE THYROXINE: CPT

## 2022-03-12 PROCEDURE — 36415 COLL VENOUS BLD VENIPUNCTURE: CPT

## 2022-03-12 PROCEDURE — 84702 CHORIONIC GONADOTROPIN TEST: CPT

## 2022-03-12 PROCEDURE — 84481 FREE ASSAY (FT-3): CPT

## 2022-03-12 PROCEDURE — 82024 ASSAY OF ACTH: CPT

## 2022-03-12 PROCEDURE — 82533 TOTAL CORTISOL: CPT

## 2022-03-12 PROCEDURE — 84443 ASSAY THYROID STIM HORMONE: CPT

## 2022-03-15 ENCOUNTER — TELEPHONE (OUTPATIENT)
Dept: INTERNAL MEDICINE CLINIC | Facility: CLINIC | Age: 47
End: 2022-03-15

## 2022-03-17 LAB — ADRENOCORTICOTROPIC HORMONE: 16.6 PG/ML

## 2022-04-04 RX ORDER — BUPROPION HYDROCHLORIDE 150 MG/1
150 TABLET ORAL DAILY
Qty: 90 TABLET | Refills: 0 | Status: SHIPPED | OUTPATIENT
Start: 2022-04-04

## 2022-04-04 NOTE — TELEPHONE ENCOUNTER
Last OV: 10/15/21    Future Appointments   Date Time Provider Melissa Perezi   4/12/2022 12:15 PM KONRAD Rouse  [facility] [place]   5/10/2022  1:10 PM Gibran Daily MD  [facility] [place]   6/7/2022  1:10 PM Gibran Daily MD  [facility] [place]   7/5/2022  1:10 PM Gibran Daily MD  [facility] [place]   7/18/2022  2:00 PM MD LAVON Beckman Pro   8/2/2022  1:20 PM Gibran Daily MD  [facility] [place]   8/30/2022  1:10 PM Gibran Daily MD [facility]        Latest labs: 3/12/22 Free T3, T4, TSH    Latest RX: sertraline and Bupropion- 90 tabs 3 refills on 3/22/21    Per protocol, not on protocol. Rx pending.

## 2022-04-13 ENCOUNTER — TELEPHONE (OUTPATIENT)
Dept: INTERNAL MEDICINE CLINIC | Facility: CLINIC | Age: 47
End: 2022-04-13

## 2022-04-13 NOTE — TELEPHONE ENCOUNTER
Pt has had a few recent labs already. 3/12/22 T4 and TSH was done 12/30/21 Lipid and CBC were done. Pending labs from Dr Merline Velazquez are Lipid, CMP and CBC.  DO you wish to order any labs prior to appointment on 4/26/22

## 2022-04-13 NOTE — TELEPHONE ENCOUNTER
Annual Physical   Future Appointments   Date Time Provider Melissa Ortiz   4/26/2022  1:40 PM Judith Humphrey, APRN [facility] [address] [facility]       Lab is [facility]  Pt aware to fast and to complete labs no sooner than 2 weeks prior to physical   No call back required

## 2022-04-26 ENCOUNTER — OFFICE VISIT (OUTPATIENT)
Dept: INTERNAL MEDICINE CLINIC | Facility: CLINIC | Age: 47
End: 2022-04-26
Payer: COMMERCIAL

## 2022-04-26 VITALS
HEIGHT: 67 IN | HEART RATE: 60 BPM | WEIGHT: 186 LBS | TEMPERATURE: 98 F | SYSTOLIC BLOOD PRESSURE: 136 MMHG | BODY MASS INDEX: 29.19 KG/M2 | DIASTOLIC BLOOD PRESSURE: 84 MMHG

## 2022-04-26 DIAGNOSIS — T38.0X5D ADVERSE EFFECT OF GLUCOCORTICOID OR SYNTHETIC ANALOGUE, SUBSEQUENT ENCOUNTER: ICD-10-CM

## 2022-04-26 DIAGNOSIS — Z12.31 ENCOUNTER FOR SCREENING MAMMOGRAM FOR MALIGNANT NEOPLASM OF BREAST: ICD-10-CM

## 2022-04-26 DIAGNOSIS — G43.009 MIGRAINE WITHOUT AURA AND WITHOUT STATUS MIGRAINOSUS, NOT INTRACTABLE: ICD-10-CM

## 2022-04-26 DIAGNOSIS — Z12.11 SCREEN FOR COLON CANCER: ICD-10-CM

## 2022-04-26 DIAGNOSIS — E03.9 HYPOTHYROIDISM (ACQUIRED): ICD-10-CM

## 2022-04-26 DIAGNOSIS — Z00.00 ROUTINE GENERAL MEDICAL EXAMINATION AT A HEALTH CARE FACILITY: Primary | ICD-10-CM

## 2022-04-26 DIAGNOSIS — E78.5 DYSLIPIDEMIA: ICD-10-CM

## 2022-04-26 DIAGNOSIS — F41.1 ANXIETY STATE: ICD-10-CM

## 2022-04-26 PROBLEM — T38.0X5A ADVERSE EFFECT OF GLUCOCORTICOID OR SYNTHETIC ANALOGUE: Status: ACTIVE | Noted: 2022-04-26

## 2022-04-26 PROCEDURE — 99396 PREV VISIT EST AGE 40-64: CPT | Performed by: NURSE PRACTITIONER

## 2022-04-26 PROCEDURE — 3008F BODY MASS INDEX DOCD: CPT | Performed by: NURSE PRACTITIONER

## 2022-04-26 PROCEDURE — 3079F DIAST BP 80-89 MM HG: CPT | Performed by: NURSE PRACTITIONER

## 2022-04-26 PROCEDURE — 3075F SYST BP GE 130 - 139MM HG: CPT | Performed by: NURSE PRACTITIONER

## 2022-04-26 RX ORDER — LEVOTHYROXINE SODIUM 175 UG/1
175 TABLET ORAL
Qty: 90 TABLET | Refills: 3 | Status: SHIPPED | OUTPATIENT
Start: 2022-04-26

## 2022-04-26 RX ORDER — BUPROPION HYDROCHLORIDE 150 MG/1
150 TABLET ORAL DAILY
Qty: 90 TABLET | Refills: 3 | Status: SHIPPED | OUTPATIENT
Start: 2022-04-26

## 2022-04-26 RX ORDER — SUMATRIPTAN 100 MG/1
100 TABLET, FILM COATED ORAL AS NEEDED
Qty: 18 TABLET | Refills: 3 | Status: SHIPPED | OUTPATIENT
Start: 2022-04-26

## 2022-07-28 ENCOUNTER — LAB ENCOUNTER (OUTPATIENT)
Dept: LAB | Age: 47
End: 2022-07-28
Attending: NURSE PRACTITIONER
Payer: COMMERCIAL

## 2022-07-28 DIAGNOSIS — E03.9 HYPOTHYROIDISM (ACQUIRED): ICD-10-CM

## 2022-07-28 LAB
T4 FREE SERPL-MCNC: 1.2 NG/DL (ref 0.8–1.7)
TSI SER-ACNC: 4.69 MIU/ML (ref 0.36–3.74)

## 2022-07-28 PROCEDURE — 84443 ASSAY THYROID STIM HORMONE: CPT

## 2022-07-28 PROCEDURE — 36415 COLL VENOUS BLD VENIPUNCTURE: CPT

## 2022-07-28 PROCEDURE — 84439 ASSAY OF FREE THYROXINE: CPT

## 2022-08-01 DIAGNOSIS — E03.9 HYPOTHYROIDISM (ACQUIRED): Primary | ICD-10-CM

## 2022-09-19 ENCOUNTER — HOSPITAL ENCOUNTER (EMERGENCY)
Facility: HOSPITAL | Age: 47
Discharge: HOME | End: 2022-09-19
Attending: EMERGENCY MEDICINE

## 2022-09-19 ENCOUNTER — APPOINTMENT (OUTPATIENT)
Dept: MRI IMAGING | Facility: HOSPITAL | Age: 47
End: 2022-09-19
Attending: EMERGENCY MEDICINE

## 2022-09-19 VITALS
OXYGEN SATURATION: 99 % | BODY MASS INDEX: 28.25 KG/M2 | HEART RATE: 56 BPM | DIASTOLIC BLOOD PRESSURE: 97 MMHG | RESPIRATION RATE: 18 BRPM | TEMPERATURE: 97 F | WEIGHT: 180 LBS | SYSTOLIC BLOOD PRESSURE: 154 MMHG | HEIGHT: 67 IN

## 2022-09-19 DIAGNOSIS — H53.453 DECREASED PERIPHERAL VISION OF BOTH EYES: Primary | ICD-10-CM

## 2022-09-19 LAB
ALBUMIN SERPL-MCNC: 3.8 G/DL (ref 3.4–5)
ALBUMIN/GLOB SERPL: 1 {RATIO} (ref 1–2)
ALP LIVER SERPL-CCNC: 92 U/L
ALT SERPL-CCNC: 50 U/L
ANION GAP SERPL CALC-SCNC: 5 MMOL/L (ref 0–18)
APTT PPP: 28.7 SECONDS (ref 23.3–35.6)
AST SERPL-CCNC: 27 U/L (ref 15–37)
B-HCG UR QL: NEGATIVE
BASOPHILS # BLD AUTO: 0.12 X10(3) UL (ref 0–0.2)
BASOPHILS NFR BLD AUTO: 1.5 %
BILIRUB SERPL-MCNC: 0.6 MG/DL (ref 0.1–2)
BILIRUB UR QL STRIP.AUTO: NEGATIVE
BUN BLD-MCNC: 13 MG/DL (ref 7–18)
CALCIUM BLD-MCNC: 9.7 MG/DL (ref 8.5–10.1)
CHLORIDE SERPL-SCNC: 103 MMOL/L (ref 98–112)
CLARITY UR REFRACT.AUTO: CLEAR
CO2 SERPL-SCNC: 29 MMOL/L (ref 21–32)
COLOR UR AUTO: YELLOW
CREAT BLD-MCNC: 0.97 MG/DL
EOSINOPHIL # BLD AUTO: 0.32 X10(3) UL (ref 0–0.7)
EOSINOPHIL NFR BLD AUTO: 4 %
ERYTHROCYTE [DISTWIDTH] IN BLOOD BY AUTOMATED COUNT: 12.5 %
ERYTHROCYTE [SEDIMENTATION RATE] IN BLOOD: 30 MM/HR
GFR SERPLBLD BASED ON 1.73 SQ M-ARVRAT: 73 ML/MIN/1.73M2 (ref 60–?)
GLOBULIN PLAS-MCNC: 4 G/DL (ref 2.8–4.4)
GLUCOSE BLD-MCNC: 91 MG/DL (ref 70–99)
GLUCOSE UR STRIP.AUTO-MCNC: NEGATIVE MG/DL
HCT VFR BLD AUTO: 46 %
HGB BLD-MCNC: 15.7 G/DL
IMM GRANULOCYTES # BLD AUTO: 0.15 X10(3) UL (ref 0–1)
IMM GRANULOCYTES NFR BLD: 1.9 %
INR BLD: 0.95 (ref 0.85–1.16)
KETONES UR STRIP.AUTO-MCNC: NEGATIVE MG/DL
LEUKOCYTE ESTERASE UR QL STRIP.AUTO: NEGATIVE
LYMPHOCYTES # BLD AUTO: 1.66 X10(3) UL (ref 1–4)
LYMPHOCYTES NFR BLD AUTO: 20.9 %
MCH RBC QN AUTO: 33.1 PG (ref 26–34)
MCHC RBC AUTO-ENTMCNC: 34.1 G/DL (ref 31–37)
MCV RBC AUTO: 97 FL
MONOCYTES # BLD AUTO: 0.83 X10(3) UL (ref 0.1–1)
MONOCYTES NFR BLD AUTO: 10.4 %
NEUTROPHILS # BLD AUTO: 4.88 X10 (3) UL (ref 1.5–7.7)
NEUTROPHILS # BLD AUTO: 4.88 X10(3) UL (ref 1.5–7.7)
NEUTROPHILS NFR BLD AUTO: 61.3 %
NITRITE UR QL STRIP.AUTO: NEGATIVE
OSMOLALITY SERPL CALC.SUM OF ELEC: 284 MOSM/KG (ref 275–295)
PH UR STRIP.AUTO: 7 [PH] (ref 5–8)
PLATELET # BLD AUTO: 352 10(3)UL (ref 150–450)
POTASSIUM SERPL-SCNC: 3.8 MMOL/L (ref 3.5–5.1)
PROT SERPL-MCNC: 7.8 G/DL (ref 6.4–8.2)
PROT UR STRIP.AUTO-MCNC: NEGATIVE MG/DL
PROTHROMBIN TIME: 12.7 SECONDS (ref 11.6–14.8)
RBC # BLD AUTO: 4.74 X10(6)UL
SARS-COV-2 RNA RESP QL NAA+PROBE: NOT DETECTED
SODIUM SERPL-SCNC: 137 MMOL/L (ref 136–145)
SP GR UR STRIP.AUTO: 1.01 (ref 1–1.03)
UROBILINOGEN UR STRIP.AUTO-MCNC: 0.2 MG/DL
WBC # BLD AUTO: 8 X10(3) UL (ref 4–11)

## 2022-09-19 PROCEDURE — 80053 COMPREHEN METABOLIC PANEL: CPT | Performed by: EMERGENCY MEDICINE

## 2022-09-19 PROCEDURE — 85730 THROMBOPLASTIN TIME PARTIAL: CPT | Performed by: EMERGENCY MEDICINE

## 2022-09-19 PROCEDURE — A9575 INJ GADOTERATE MEGLUMI 0.1ML: HCPCS | Performed by: EMERGENCY MEDICINE

## 2022-09-19 PROCEDURE — 85025 COMPLETE CBC W/AUTO DIFF WBC: CPT | Performed by: EMERGENCY MEDICINE

## 2022-09-19 PROCEDURE — 81025 URINE PREGNANCY TEST: CPT

## 2022-09-19 PROCEDURE — 85610 PROTHROMBIN TIME: CPT | Performed by: EMERGENCY MEDICINE

## 2022-09-19 PROCEDURE — 81015 MICROSCOPIC EXAM OF URINE: CPT | Performed by: EMERGENCY MEDICINE

## 2022-09-19 PROCEDURE — 36415 COLL VENOUS BLD VENIPUNCTURE: CPT

## 2022-09-19 PROCEDURE — 81001 URINALYSIS AUTO W/SCOPE: CPT | Performed by: EMERGENCY MEDICINE

## 2022-09-19 PROCEDURE — 85652 RBC SED RATE AUTOMATED: CPT | Performed by: EMERGENCY MEDICINE

## 2022-09-19 PROCEDURE — 70553 MRI BRAIN STEM W/O & W/DYE: CPT | Performed by: EMERGENCY MEDICINE

## 2022-09-19 PROCEDURE — 99284 EMERGENCY DEPT VISIT MOD MDM: CPT

## 2022-09-19 NOTE — ED INITIAL ASSESSMENT (HPI)
Patient sts peripheral and outer eye visual fields she can't see and seems blurry at times. Intermittent visual issues. Patient sts since last night it's been constant. Patient sts saw an eye doctor 2 weeks ago and had a normal exam. Denies headaches. Denies weakness.  sts patient acting normal and no memory or speech problems. Patient sts has never happened in the past before. Denies fevers.

## 2022-09-21 ENCOUNTER — PATIENT MESSAGE (OUTPATIENT)
Dept: INTERNAL MEDICINE CLINIC | Facility: CLINIC | Age: 47
End: 2022-09-21

## 2022-09-22 NOTE — TELEPHONE ENCOUNTER
From: Magda Massey  To: Janet Huizar MD  Sent: 9/21/2022 12:22 PM CDT  Subject: Ophthalmology Referral    Good afternoon  I was seen in the ER Monday night for vision changes. I had an MRI brain that was normal. I was at optometrist 2 weeks ago but ER wants me to see an ophthalmologist. Could I please get a referral for one? I messaged [facility], but have not heard back.     Thank you,  Kwesi Strong

## 2022-09-23 ENCOUNTER — HOSPITAL ENCOUNTER (OUTPATIENT)
Dept: MRI IMAGING | Facility: HOSPITAL | Age: 47
Discharge: HOME | End: 2022-09-23
Attending: OPHTHALMOLOGY

## 2022-09-23 DIAGNOSIS — H54.7 LOSS OF VISION: ICD-10-CM

## 2022-09-23 PROCEDURE — 70553 MRI BRAIN STEM W/O & W/DYE: CPT | Performed by: OPHTHALMOLOGY

## 2022-09-23 PROCEDURE — 70543 MRI ORBT/FAC/NCK W/O &W/DYE: CPT | Performed by: OPHTHALMOLOGY

## 2022-09-23 PROCEDURE — A9575 INJ GADOTERATE MEGLUMI 0.1ML: HCPCS | Performed by: OPHTHALMOLOGY

## 2022-09-26 ENCOUNTER — MED REC SCAN ONLY (OUTPATIENT)
Dept: INTERNAL MEDICINE CLINIC | Facility: CLINIC | Age: 47
End: 2022-09-26

## 2022-10-05 ENCOUNTER — MED REC SCAN ONLY (OUTPATIENT)
Dept: INTERNAL MEDICINE CLINIC | Facility: CLINIC | Age: 47
End: 2022-10-05

## 2022-12-05 ENCOUNTER — LAB ENCOUNTER (OUTPATIENT)
Dept: LAB | Age: 47
End: 2022-12-05
Attending: NURSE PRACTITIONER
Payer: COMMERCIAL

## 2022-12-05 DIAGNOSIS — Z11.59 NEED FOR HEPATITIS B SCREENING TEST: ICD-10-CM

## 2022-12-05 DIAGNOSIS — Z01.84 IMMUNITY STATUS TESTING: ICD-10-CM

## 2022-12-05 DIAGNOSIS — E03.9 HYPOTHYROIDISM (ACQUIRED): ICD-10-CM

## 2022-12-05 DIAGNOSIS — Z11.1 SCREENING-PULMONARY TB: ICD-10-CM

## 2022-12-05 LAB
HBV SURFACE AB SER QL: REACTIVE
HBV SURFACE AB SERPL IA-ACNC: 263.61 MIU/ML
RUBV IGG SER QL: POSITIVE
RUBV IGG SER-ACNC: 259.3 IU/ML (ref 10–?)
T4 FREE SERPL-MCNC: 0.9 NG/DL (ref 0.8–1.7)
TSI SER-ACNC: 11.5 MIU/ML (ref 0.36–3.74)

## 2022-12-05 PROCEDURE — 86480 TB TEST CELL IMMUN MEASURE: CPT

## 2022-12-05 PROCEDURE — 86765 RUBEOLA ANTIBODY: CPT

## 2022-12-05 PROCEDURE — 86735 MUMPS ANTIBODY: CPT

## 2022-12-05 PROCEDURE — 84439 ASSAY OF FREE THYROXINE: CPT

## 2022-12-05 PROCEDURE — 86787 VARICELLA-ZOSTER ANTIBODY: CPT

## 2022-12-05 PROCEDURE — 86762 RUBELLA ANTIBODY: CPT

## 2022-12-05 PROCEDURE — 36415 COLL VENOUS BLD VENIPUNCTURE: CPT

## 2022-12-05 PROCEDURE — 86706 HEP B SURFACE ANTIBODY: CPT

## 2022-12-05 PROCEDURE — 84443 ASSAY THYROID STIM HORMONE: CPT

## 2022-12-05 RX ORDER — SUMATRIPTAN 100 MG/1
100 TABLET, FILM COATED ORAL AS NEEDED
Qty: 18 TABLET | Refills: 0 | Status: SHIPPED | OUTPATIENT
Start: 2022-12-05

## 2022-12-06 ENCOUNTER — PATIENT MESSAGE (OUTPATIENT)
Dept: INTERNAL MEDICINE CLINIC | Facility: CLINIC | Age: 47
End: 2022-12-06

## 2022-12-06 DIAGNOSIS — L70.0 ACNE VULGARIS: Primary | ICD-10-CM

## 2022-12-06 NOTE — TELEPHONE ENCOUNTER
From: Kike Aponte  To: JÚNIOR Vicente  Sent: 12/6/2022 9:03 AM CST  Subject: Referral for dermatology     Good morning. I have an appointment with Dr [facility]ology [facility] tomorrow and they just called me to let me know I need a referral. Would you be able to place a referral please?     Thank you,  Rabia Spivey

## 2022-12-07 LAB
M TB IFN-G CD4+ T-CELLS BLD-ACNC: 0.04 IU/ML
M TB TUBERC IFN-G BLD QL: NEGATIVE
M TB TUBERC IGNF/MITOGEN IGNF CONTROL: >10 IU/ML
QFT TB1 AG MINUS NIL: 0 IU/ML
QFT TB2 AG MINUS NIL: 0.01 IU/ML

## 2022-12-09 LAB
MEV IGG SER-ACNC: 9.1 AU/ML (ref 16.5–?)
MUV IGG SER IA-ACNC: 105 AU/ML (ref 11–?)
VZV IGG SER IA-ACNC: 1392 (ref 165–?)

## 2023-02-13 RX ORDER — SUMATRIPTAN 100 MG/1
100 TABLET, FILM COATED ORAL AS NEEDED
Qty: 18 TABLET | Refills: 0 | Status: SHIPPED | OUTPATIENT
Start: 2023-02-13

## 2023-02-24 ENCOUNTER — LAB ENCOUNTER (OUTPATIENT)
Dept: LAB | Age: 48
End: 2023-02-24
Attending: INTERNAL MEDICINE
Payer: COMMERCIAL

## 2023-02-24 DIAGNOSIS — R79.89 HYPOURICEMIA: ICD-10-CM

## 2023-02-24 DIAGNOSIS — I51.9 MYXEDEMA HEART DISEASE: Primary | ICD-10-CM

## 2023-02-24 DIAGNOSIS — E03.9 MYXEDEMA HEART DISEASE: Primary | ICD-10-CM

## 2023-02-24 LAB
CORTIS SERPL-MCNC: 15 UG/DL
EST. AVERAGE GLUCOSE BLD GHB EST-MCNC: 108 MG/DL (ref 68–126)
HBA1C MFR BLD: 5.4 % (ref ?–5.7)
PROLACTIN SERPL-MCNC: 13.2 NG/ML
T3FREE SERPL-MCNC: 2.59 PG/ML (ref 2.4–4.2)
T4 FREE SERPL-MCNC: 1.1 NG/DL (ref 0.8–1.7)
TSI SER-ACNC: 0.58 MIU/ML (ref 0.36–3.74)

## 2023-02-24 PROCEDURE — 84146 ASSAY OF PROLACTIN: CPT

## 2023-02-24 PROCEDURE — 84481 FREE ASSAY (FT-3): CPT

## 2023-02-24 PROCEDURE — 82533 TOTAL CORTISOL: CPT

## 2023-02-24 PROCEDURE — 84439 ASSAY OF FREE THYROXINE: CPT

## 2023-02-24 PROCEDURE — 84443 ASSAY THYROID STIM HORMONE: CPT

## 2023-02-24 PROCEDURE — 36415 COLL VENOUS BLD VENIPUNCTURE: CPT

## 2023-02-24 PROCEDURE — 82024 ASSAY OF ACTH: CPT

## 2023-02-24 PROCEDURE — 83036 HEMOGLOBIN GLYCOSYLATED A1C: CPT

## 2023-02-24 PROCEDURE — 84305 ASSAY OF SOMATOMEDIN: CPT

## 2023-02-26 LAB
ADRENOCORTICOTROPIC HORMONE: 40.9 PG/ML
IGF 1 Z SCORE CALCULATION: -0.3
IGF-1 (INSULINE-LIKE GROWTH FACTOR 1): 121 NG/ML

## 2023-04-11 ENCOUNTER — HOSPITAL ENCOUNTER (OUTPATIENT)
Age: 48
Discharge: HOME | End: 2023-04-11
Payer: COMMERCIAL

## 2023-04-11 VITALS
WEIGHT: 170 LBS | DIASTOLIC BLOOD PRESSURE: 84 MMHG | SYSTOLIC BLOOD PRESSURE: 143 MMHG | HEART RATE: 60 BPM | BODY MASS INDEX: 25.76 KG/M2 | OXYGEN SATURATION: 98 % | HEIGHT: 68 IN | RESPIRATION RATE: 18 BRPM

## 2023-04-11 DIAGNOSIS — H60.501 ACUTE OTITIS EXTERNA OF RIGHT EAR, UNSPECIFIED TYPE: Primary | ICD-10-CM

## 2023-04-11 PROCEDURE — 99213 OFFICE O/P EST LOW 20 MIN: CPT | Performed by: NURSE PRACTITIONER

## 2023-05-03 ENCOUNTER — TELEPHONE (OUTPATIENT)
Dept: INTERNAL MEDICINE CLINIC | Facility: CLINIC | Age: 48
End: 2023-05-03

## 2023-05-03 DIAGNOSIS — Z13.0 SCREENING FOR DISORDER OF BLOOD AND BLOOD-FORMING ORGANS: ICD-10-CM

## 2023-05-03 DIAGNOSIS — Z13.220 SCREENING FOR LIPID DISORDERS: ICD-10-CM

## 2023-05-03 DIAGNOSIS — Z00.00 ROUTINE GENERAL MEDICAL EXAMINATION AT A HEALTH CARE FACILITY: Primary | ICD-10-CM

## 2023-05-03 DIAGNOSIS — Z13.228 SCREENING FOR METABOLIC DISORDER: ICD-10-CM

## 2023-05-03 NOTE — TELEPHONE ENCOUNTER
Orders to   [facility]            Pt aware to get labs done no sooner than 2 weeks prior to the appt. Pt aware to fast.  No call back required.   Future Appointments   Date Time Provider Melissa Ortiz   5/31/2023  7:20 AM JÚNIOR Veloz [facility]

## 2023-05-04 RX ORDER — SUMATRIPTAN 100 MG/1
100 TABLET, FILM COATED ORAL AS NEEDED
Qty: 18 TABLET | Refills: 0 | Status: SHIPPED | OUTPATIENT
Start: 2023-05-04

## 2023-05-04 NOTE — TELEPHONE ENCOUNTER
Last OV: 04/26/22    Future Appointments   Date Time Provider Melissa Ortiz   5/31/2023  7:20 AM JÚNIOR Chowdary [facility]        Latest labs: 02/24/23     Latest RX: sumatriptan 10 mg 02/13/23

## 2023-05-27 ENCOUNTER — LAB ENCOUNTER (OUTPATIENT)
Dept: LAB | Age: 48
End: 2023-05-27
Attending: NURSE PRACTITIONER
Payer: COMMERCIAL

## 2023-05-27 DIAGNOSIS — Z13.220 SCREENING FOR LIPID DISORDERS: ICD-10-CM

## 2023-05-27 DIAGNOSIS — Z13.0 SCREENING FOR DISORDER OF BLOOD AND BLOOD-FORMING ORGANS: ICD-10-CM

## 2023-05-27 DIAGNOSIS — Z00.00 ROUTINE GENERAL MEDICAL EXAMINATION AT A HEALTH CARE FACILITY: ICD-10-CM

## 2023-05-27 DIAGNOSIS — Z13.228 SCREENING FOR METABOLIC DISORDER: ICD-10-CM

## 2023-05-27 LAB
ALBUMIN SERPL-MCNC: 3.8 G/DL (ref 3.4–5)
ALBUMIN/GLOB SERPL: 1.1 {RATIO} (ref 1–2)
ALP LIVER SERPL-CCNC: 99 U/L
ALT SERPL-CCNC: 28 U/L
ANION GAP SERPL CALC-SCNC: 3 MMOL/L (ref 0–18)
AST SERPL-CCNC: 24 U/L (ref 15–37)
BASOPHILS # BLD AUTO: 0.07 X10(3) UL (ref 0–0.2)
BASOPHILS NFR BLD AUTO: 1.2 %
BILIRUB SERPL-MCNC: 0.6 MG/DL (ref 0.1–2)
BUN BLD-MCNC: 16 MG/DL (ref 7–18)
CALCIUM BLD-MCNC: 9.3 MG/DL (ref 8.5–10.1)
CHLORIDE SERPL-SCNC: 108 MMOL/L (ref 98–112)
CHOLEST SERPL-MCNC: 213 MG/DL (ref ?–200)
CO2 SERPL-SCNC: 27 MMOL/L (ref 21–32)
CREAT BLD-MCNC: 0.88 MG/DL
EOSINOPHIL # BLD AUTO: 0.13 X10(3) UL (ref 0–0.7)
EOSINOPHIL NFR BLD AUTO: 2.2 %
ERYTHROCYTE [DISTWIDTH] IN BLOOD BY AUTOMATED COUNT: 12.1 %
FASTING PATIENT LIPID ANSWER: YES
FASTING STATUS PATIENT QL REPORTED: YES
GFR SERPLBLD BASED ON 1.73 SQ M-ARVRAT: 82 ML/MIN/1.73M2 (ref 60–?)
GLOBULIN PLAS-MCNC: 3.5 G/DL (ref 2.8–4.4)
GLUCOSE BLD-MCNC: 94 MG/DL (ref 70–99)
HCT VFR BLD AUTO: 44.2 %
HDLC SERPL-MCNC: 52 MG/DL (ref 40–59)
HGB BLD-MCNC: 15 G/DL
IMM GRANULOCYTES # BLD AUTO: 0.03 X10(3) UL (ref 0–1)
IMM GRANULOCYTES NFR BLD: 0.5 %
LDLC SERPL CALC-MCNC: 143 MG/DL (ref ?–100)
LYMPHOCYTES # BLD AUTO: 2.03 X10(3) UL (ref 1–4)
LYMPHOCYTES NFR BLD AUTO: 34 %
MCH RBC QN AUTO: 31.3 PG (ref 26–34)
MCHC RBC AUTO-ENTMCNC: 33.9 G/DL (ref 31–37)
MCV RBC AUTO: 92.3 FL
MONOCYTES # BLD AUTO: 0.54 X10(3) UL (ref 0.1–1)
MONOCYTES NFR BLD AUTO: 9 %
NEUTROPHILS # BLD AUTO: 3.17 X10 (3) UL (ref 1.5–7.7)
NEUTROPHILS # BLD AUTO: 3.17 X10(3) UL (ref 1.5–7.7)
NEUTROPHILS NFR BLD AUTO: 53.1 %
NONHDLC SERPL-MCNC: 161 MG/DL (ref ?–130)
OSMOLALITY SERPL CALC.SUM OF ELEC: 287 MOSM/KG (ref 275–295)
PLATELET # BLD AUTO: 313 10(3)UL (ref 150–450)
POTASSIUM SERPL-SCNC: 4.7 MMOL/L (ref 3.5–5.1)
PROT SERPL-MCNC: 7.3 G/DL (ref 6.4–8.2)
RBC # BLD AUTO: 4.79 X10(6)UL
SODIUM SERPL-SCNC: 138 MMOL/L (ref 136–145)
TRIGL SERPL-MCNC: 101 MG/DL (ref 30–149)
VLDLC SERPL CALC-MCNC: 19 MG/DL (ref 0–30)
WBC # BLD AUTO: 6 X10(3) UL (ref 4–11)

## 2023-05-27 PROCEDURE — 80061 LIPID PANEL: CPT

## 2023-05-27 PROCEDURE — 80053 COMPREHEN METABOLIC PANEL: CPT

## 2023-05-27 PROCEDURE — 85025 COMPLETE CBC W/AUTO DIFF WBC: CPT

## 2023-05-27 PROCEDURE — 36415 COLL VENOUS BLD VENIPUNCTURE: CPT

## 2023-06-03 RX ORDER — SUMATRIPTAN 100 MG/1
TABLET, FILM COATED ORAL
Qty: 18 TABLET | Refills: 0 | Status: SHIPPED | OUTPATIENT
Start: 2023-06-03

## 2023-06-14 ENCOUNTER — OFFICE VISIT (OUTPATIENT)
Dept: INTERNAL MEDICINE CLINIC | Facility: CLINIC | Age: 48
End: 2023-06-14
Payer: COMMERCIAL

## 2023-06-14 VITALS
SYSTOLIC BLOOD PRESSURE: 134 MMHG | HEIGHT: 66.54 IN | TEMPERATURE: 97 F | DIASTOLIC BLOOD PRESSURE: 78 MMHG | WEIGHT: 191.19 LBS | RESPIRATION RATE: 16 BRPM | OXYGEN SATURATION: 96 % | BODY MASS INDEX: 30.36 KG/M2 | HEART RATE: 64 BPM

## 2023-06-14 DIAGNOSIS — E78.5 DYSLIPIDEMIA: ICD-10-CM

## 2023-06-14 DIAGNOSIS — Z12.11 SCREEN FOR COLON CANCER: ICD-10-CM

## 2023-06-14 DIAGNOSIS — E03.9 HYPOTHYROIDISM (ACQUIRED): Primary | ICD-10-CM

## 2023-06-14 DIAGNOSIS — Z00.00 ROUTINE GENERAL MEDICAL EXAMINATION AT A HEALTH CARE FACILITY: ICD-10-CM

## 2023-06-14 DIAGNOSIS — F41.9 ANXIETY: ICD-10-CM

## 2023-06-14 DIAGNOSIS — G43.009 MIGRAINE WITHOUT AURA AND WITHOUT STATUS MIGRAINOSUS, NOT INTRACTABLE: ICD-10-CM

## 2023-06-14 DIAGNOSIS — Z12.31 ENCOUNTER FOR SCREENING MAMMOGRAM FOR MALIGNANT NEOPLASM OF BREAST: ICD-10-CM

## 2023-06-14 PROCEDURE — 99396 PREV VISIT EST AGE 40-64: CPT | Performed by: NURSE PRACTITIONER

## 2023-06-14 PROCEDURE — 3078F DIAST BP <80 MM HG: CPT | Performed by: NURSE PRACTITIONER

## 2023-06-14 PROCEDURE — 3075F SYST BP GE 130 - 139MM HG: CPT | Performed by: NURSE PRACTITIONER

## 2023-06-14 PROCEDURE — 3008F BODY MASS INDEX DOCD: CPT | Performed by: NURSE PRACTITIONER

## 2023-06-14 RX ORDER — SUMATRIPTAN 100 MG/1
TABLET, FILM COATED ORAL
Qty: 18 TABLET | Refills: 0 | Status: SHIPPED | OUTPATIENT
Start: 2023-06-14

## 2023-06-14 RX ORDER — LEVOTHYROXINE SODIUM 175 UG/1
175 TABLET ORAL
Qty: 90 TABLET | Refills: 3 | Status: CANCELLED | OUTPATIENT
Start: 2023-06-14

## 2023-08-24 ENCOUNTER — OFFICE VISIT (OUTPATIENT)
Dept: INTERNAL MEDICINE CLINIC | Facility: CLINIC | Age: 48
End: 2023-08-24
Payer: COMMERCIAL

## 2023-08-24 VITALS
WEIGHT: 195 LBS | SYSTOLIC BLOOD PRESSURE: 122 MMHG | HEART RATE: 78 BPM | DIASTOLIC BLOOD PRESSURE: 78 MMHG | HEIGHT: 68 IN | BODY MASS INDEX: 29.55 KG/M2 | RESPIRATION RATE: 16 BRPM

## 2023-08-24 DIAGNOSIS — E03.9 HYPOTHYROIDISM (ACQUIRED): ICD-10-CM

## 2023-08-24 DIAGNOSIS — E66.3 OVERWEIGHT (BMI 25.0-29.9): ICD-10-CM

## 2023-08-24 DIAGNOSIS — Z51.81 ENCOUNTER FOR THERAPEUTIC DRUG MONITORING: Primary | ICD-10-CM

## 2023-08-24 DIAGNOSIS — E78.5 DYSLIPIDEMIA: ICD-10-CM

## 2023-08-24 PROCEDURE — 3074F SYST BP LT 130 MM HG: CPT | Performed by: INTERNAL MEDICINE

## 2023-08-24 PROCEDURE — 99214 OFFICE O/P EST MOD 30 MIN: CPT | Performed by: INTERNAL MEDICINE

## 2023-08-24 PROCEDURE — 3008F BODY MASS INDEX DOCD: CPT | Performed by: INTERNAL MEDICINE

## 2023-08-24 PROCEDURE — 3078F DIAST BP <80 MM HG: CPT | Performed by: INTERNAL MEDICINE

## 2023-08-24 RX ORDER — SEMAGLUTIDE 0.68 MG/ML
INJECTION, SOLUTION SUBCUTANEOUS
Qty: 1 EACH | Refills: 2 | Status: SHIPPED | OUTPATIENT
Start: 2023-08-24

## 2023-08-24 RX ORDER — AZELAIC ACID 0.15 G/G
1 GEL TOPICAL EVERY MORNING
COMMUNITY
Start: 2022-12-07

## 2023-08-29 ENCOUNTER — TELEPHONE (OUTPATIENT)
Dept: INTERNAL MEDICINE CLINIC | Facility: CLINIC | Age: 48
End: 2023-08-29

## 2023-08-29 DIAGNOSIS — E66.3 OVERWEIGHT (BMI 25.0-29.9): ICD-10-CM

## 2023-08-29 DIAGNOSIS — Z51.81 ENCOUNTER FOR THERAPEUTIC DRUG MONITORING: ICD-10-CM

## 2023-08-29 DIAGNOSIS — E78.5 DYSLIPIDEMIA: Primary | ICD-10-CM

## 2023-08-29 NOTE — TELEPHONE ENCOUNTER
PA requested for Ozempic in [facility]  [id number]      No noted diagnosis of DM or Pre DM - must verify with provider what diagnosis I am using to apply?

## 2023-09-16 ENCOUNTER — EKG ENCOUNTER (OUTPATIENT)
Dept: LAB | Age: 48
End: 2023-09-16
Attending: INTERNAL MEDICINE
Payer: COMMERCIAL

## 2023-09-16 DIAGNOSIS — E78.5 DYSLIPIDEMIA: ICD-10-CM

## 2023-09-16 DIAGNOSIS — E66.3 OVERWEIGHT (BMI 25.0-29.9): ICD-10-CM

## 2023-09-16 DIAGNOSIS — Z51.81 ENCOUNTER FOR THERAPEUTIC DRUG MONITORING: ICD-10-CM

## 2023-09-16 LAB
ATRIAL RATE: 49 BPM
P AXIS: 38 DEGREES
P-R INTERVAL: 174 MS
Q-T INTERVAL: 446 MS
QRS DURATION: 80 MS
QTC CALCULATION (BEZET): 402 MS
R AXIS: 37 DEGREES
T AXIS: 66 DEGREES
VENTRICULAR RATE: 49 BPM

## 2023-09-16 PROCEDURE — 93010 ELECTROCARDIOGRAM REPORT: CPT | Performed by: INTERNAL MEDICINE

## 2023-09-16 PROCEDURE — 93005 ELECTROCARDIOGRAM TRACING: CPT

## 2023-10-10 RX ORDER — SUMATRIPTAN 100 MG/1
TABLET, FILM COATED ORAL
Qty: 18 TABLET | Refills: 0 | Status: SHIPPED | OUTPATIENT
Start: 2023-10-10

## 2023-10-27 ENCOUNTER — TELEMEDICINE (OUTPATIENT)
Dept: TELEHEALTH | Age: 48
End: 2023-10-27
Payer: COMMERCIAL

## 2023-10-27 DIAGNOSIS — E66.9 OBESITY (BMI 30-39.9): ICD-10-CM

## 2023-10-27 DIAGNOSIS — Z51.81 THERAPEUTIC DRUG MONITORING: Primary | ICD-10-CM

## 2023-10-27 PROCEDURE — 99213 OFFICE O/P EST LOW 20 MIN: CPT | Performed by: INTERNAL MEDICINE

## 2023-10-27 NOTE — PROGRESS NOTES
HISTORY OF PRESENT ILLNESS  Patient presents with:  Weight Check: Video        Giovanni Cody is a 53year old female here for follow up in medical weight loss program.     Denies chest pain, shortness of breath, dizziness, blurred vision, headache, paresthesia, nausea/vomiting. Was unable to get her medicaiton until oct 4th.    Down to 184 lb on home scale   Feels she has more energy and more focus     Does a coffee and protein bar   Afternoon : does a sandwich / turkey / pb: fruit   Dinner: more carbs/ pasta / pizza and salad     Exercise: 4-5 times per walking             Wt Readings from Last 6 Encounters:  10/17/23 : 185 lb (83.9 kg)  08/24/23 : 195 lb (88.5 kg)  06/14/23 : 191 lb 3.2 oz (86.7 kg)  04/11/23 : 170 lb (77.1 kg)  09/19/22 : 180 lb (81.6 kg)  04/26/22 : 186 lb (84.4 kg)           Breakfast Lunch Dinner Snacks Fluids   reviewed           REVIEW OF SYSTEMS  GENERAL HEALTH: feels well otherwise, denied any fevers chills or night sweats   RESPIRATORY: denies shortness of breath   CARDIOVASCULAR: denies chest pain  GI: denies abdominal pain    EXAM  LMP 07/24/2023 (Approximate)   GENERAL: well developed, well nourished,in no apparent distress, A/O x3  SKIN: no rashes,no suspicious lesions  HEENT: atraumatic, normocephalic, OP-clear, PERRL  NECK: supple,no adenopathy  LUNGS: clear to auscultation bilaterally   CARDIO: RRR without murmur  GI: good BS's,NT/ND, no masses or HSM  EXTREMITIES: no cyanosis, no clubbing, no edema    Lab Results   Component Value Date    WBC 6.0 05/27/2023    RBC 4.79 05/27/2023    HGB 15.0 05/27/2023    HCT 44.2 05/27/2023    MCV 92.3 05/27/2023    MCH 31.3 05/27/2023    MCHC 33.9 05/27/2023    RDW 12.1 05/27/2023    .0 05/27/2023    MPV 10.5 (H) 10/22/2011     Lab Results   Component Value Date    GLU 94 05/27/2023    BUN 16 05/27/2023    BUNCREA 16.3 03/27/2021    CREATSERUM 0.88 05/27/2023    ANIONGAP 3 05/27/2023    GFR 85 12/12/2017    GFRNAA 82 03/27/2021 GFRAA 95 03/27/2021    CA 9.3 05/27/2023    OSMOCALC 287 05/27/2023    ALKPHO 99 05/27/2023    AST 24 05/27/2023    ALT 28 05/27/2023    BILT 0.6 05/27/2023    TP 7.3 05/27/2023    ALB 3.8 05/27/2023    GLOBULIN 3.5 05/27/2023    AGRATIO 1.5 01/12/2016     05/27/2023    K 4.7 05/27/2023     05/27/2023    CO2 27.0 05/27/2023     Lab Results   Component Value Date     02/24/2023    A1C 5.4 02/24/2023     Lab Results   Component Value Date    CHOLEST 213 (H) 05/27/2023    TRIG 101 05/27/2023    HDL 52 05/27/2023     (H) 05/27/2023    VLDL 19 05/27/2023    TCHDLRATIO 2.64 04/10/2018    NONHDLC 161 (H) 05/27/2023     Lab Results   Component Value Date    T4F 1.1 02/24/2023    TSH 0.584 02/24/2023    TSHT4 0.58 12/15/2015     Lab Results   Component Value Date    B12 586 03/27/2021     Lab Results   Component Value Date    VITD 26.3 (L) 03/27/2021       PEG 3350-KCl-Na Bicarb-NaCl 420 g Oral Recon Soln, Take as directed by physician, Disp: 4000 mL, Rfl: 0  SUMAtriptan Succinate 100 MG Oral Tab, TAKE 1 TABLET (100 MG TOTAL) BY MOUTH AS NEEDED. MAY REPEAT IN 2 HOURS MAX 2 IN 24 HOURS, Disp: 18 tablet, Rfl: 0  Phentermine HCl 37.5 MG Oral Tab, Take 1 tablet (37.5 mg total) by mouth every morning before breakfast., Disp: 30 tablet, Rfl: 1  Azelaic Acid 15 % External Gel, Apply 1 Application topically every morning., Disp: , Rfl:   sertraline 50 MG Oral Tab, Take 1 tablet (50 mg total) by mouth daily. , Disp: 90 tablet, Rfl: 3  levothyroxine 175 MCG Oral Tab, Take 1 tablet (175 mcg total) by mouth every morning before breakfast., Disp: 90 tablet, Rfl: 3  spironolactone 50 MG Oral Tab, TAKE 1 TABLET BY MOUTH TWICE DAILY (Patient taking differently: Take 1 tablet (50 mg total) by mouth daily.  TAKE 1 TABLET BY MOUTH TWICE DAILY), Disp: 180 tablet, Rfl: 1  ibuprofen (MOTRIN) 400 MG Oral Tab, Take 1 tablet (400 mg total) by mouth every 6 (six) hours as needed for Pain., Disp: , Rfl:   Multiple Vitamins-Minerals (MULTIVITAMIN & MINERAL OR), Take  by mouth daily. , Disp: , Rfl:     No current facility-administered medications on file prior to visit. ASSESSMENT  Analyzed weight data:       Diagnoses and all orders for this visit:    Therapeutic drug monitoring    Obesity (BMI 30-39. 9)          PLAN  Reconsult at 195 lb on 8/24/23   At 184 on home scale  Starting to see progress   Continue phentermine   -advised of side effects and adverse effects of this medication  Tolerating medication well   Reviewed no wegovy coverage  Nutrition: low carb diet/ recommended to eat breakfast daily/ regular protein intake  Medication use and side effects reviewed with patient. Medication contraindications: n/a   Follow up with dietitian and psychologist as recommended. Discussed the role of sleep and stress in weight management. Counseled on comprehensive weight loss plan including attention to nutrition, exercise and behavior/stress management for success. See patient instruction below for more details. Discussed strategies to overcome barriers to successful weight loss and weight maintenance  FITTE: ACSM recommendations (150-300 minutes/ week in active weight loss)   Weight Loss consent to treat reviewed and signed n/a    There are no Patient Instructions on file for this visit. No follow-ups on file. Patient verbalizes understanding.     Huma Easley MD

## 2023-11-09 ENCOUNTER — HOSPITAL ENCOUNTER (OUTPATIENT)
Age: 48
Discharge: HOME | End: 2023-11-09
Payer: COMMERCIAL

## 2023-11-09 VITALS
SYSTOLIC BLOOD PRESSURE: 153 MMHG | WEIGHT: 170 LBS | BODY MASS INDEX: 26.68 KG/M2 | RESPIRATION RATE: 16 BRPM | TEMPERATURE: 97 F | HEART RATE: 68 BPM | HEIGHT: 67 IN | OXYGEN SATURATION: 96 % | DIASTOLIC BLOOD PRESSURE: 98 MMHG

## 2023-11-09 DIAGNOSIS — B34.9 VIRAL SYNDROME: Primary | ICD-10-CM

## 2023-11-09 LAB — SARS-COV-2 RNA RESP QL NAA+PROBE: NOT DETECTED

## 2023-11-09 RX ORDER — ALBUTEROL SULFATE 90 UG/1
2 AEROSOL, METERED RESPIRATORY (INHALATION) EVERY 4 HOURS PRN
Qty: 1 EACH | Refills: 0 | Status: SHIPPED | OUTPATIENT
Start: 2023-11-09 | End: 2023-12-09

## 2023-11-09 RX ORDER — BENZONATATE 100 MG/1
100 CAPSULE ORAL 3 TIMES DAILY PRN
Qty: 30 CAPSULE | Refills: 0 | Status: SHIPPED | OUTPATIENT
Start: 2023-11-09 | End: 2023-12-09

## 2023-11-09 NOTE — DISCHARGE INSTRUCTIONS
Follow-up with your primary care physician in one week if symptoms have not improved or symptoms are starting to get worse. Increase fluids, keep well-hydrated. Take Tylenol and Motrin for fever and pain.   Take the Tessalon Perles for the cough  Use inhaler as needed  Over-the-counter Delsym or guaifenesin at night can be helpful  Return to the emergency room for symptoms or concerns

## 2023-11-14 ENCOUNTER — HOSPITAL ENCOUNTER (OUTPATIENT)
Age: 48
Discharge: HOME | End: 2023-11-14
Payer: COMMERCIAL

## 2023-11-14 VITALS
SYSTOLIC BLOOD PRESSURE: 167 MMHG | DIASTOLIC BLOOD PRESSURE: 94 MMHG | OXYGEN SATURATION: 97 % | TEMPERATURE: 97 F | HEART RATE: 67 BPM | RESPIRATION RATE: 16 BRPM

## 2023-11-14 DIAGNOSIS — B34.9 VIRAL SYNDROME: Primary | ICD-10-CM

## 2023-11-14 PROCEDURE — 99213 OFFICE O/P EST LOW 20 MIN: CPT | Performed by: NURSE PRACTITIONER

## 2023-11-14 RX ORDER — ALBUTEROL SULFATE 2.5 MG/3ML
2.5 SOLUTION RESPIRATORY (INHALATION) EVERY 4 HOURS PRN
Qty: 30 EACH | Refills: 0 | Status: SHIPPED | OUTPATIENT
Start: 2023-11-14 | End: 2023-11-19

## 2023-11-14 NOTE — ED INITIAL ASSESSMENT (HPI)
Pt c/o cough since Thursday. Pt was seen here the day her symptoms started and tested negative for covid. Pt prescribed inhaler and tesslon. No improvement in symptoms.  Tmax 100.7 at home

## 2023-11-17 ENCOUNTER — PATIENT MESSAGE (OUTPATIENT)
Dept: INTERNAL MEDICINE CLINIC | Facility: CLINIC | Age: 48
End: 2023-11-17

## 2023-11-19 RX ORDER — ALBUTEROL SULFATE 90 UG/1
2 AEROSOL, METERED RESPIRATORY (INHALATION) EVERY 4 HOURS PRN
Qty: 1 EACH | Refills: 0 | Status: SHIPPED | OUTPATIENT
Start: 2023-11-19 | End: 2023-12-19

## 2023-11-19 RX ORDER — BENZONATATE 100 MG/1
100 CAPSULE ORAL 3 TIMES DAILY PRN
Qty: 60 CAPSULE | Refills: 0 | Status: SHIPPED | OUTPATIENT
Start: 2023-11-19 | End: 2023-12-19

## 2023-11-19 RX ORDER — ALBUTEROL SULFATE 2.5 MG/3ML
2.5 SOLUTION RESPIRATORY (INHALATION) EVERY 4 HOURS PRN
Qty: 30 EACH | Refills: 0 | Status: SHIPPED | OUTPATIENT
Start: 2023-11-19 | End: 2023-12-19

## 2023-12-08 ENCOUNTER — HOSPITAL ENCOUNTER (OUTPATIENT)
Dept: MAMMOGRAPHY | Age: 48
Discharge: HOME | End: 2023-12-08
Attending: NURSE PRACTITIONER
Payer: COMMERCIAL

## 2023-12-08 DIAGNOSIS — Z12.31 ENCOUNTER FOR SCREENING MAMMOGRAM FOR MALIGNANT NEOPLASM OF BREAST: ICD-10-CM

## 2023-12-08 PROCEDURE — 77063 BREAST TOMOSYNTHESIS BI: CPT | Performed by: NURSE PRACTITIONER

## 2023-12-08 PROCEDURE — 77067 SCR MAMMO BI INCL CAD: CPT | Performed by: NURSE PRACTITIONER

## 2023-12-29 DIAGNOSIS — F41.9 ANXIETY: ICD-10-CM

## 2023-12-31 RX ORDER — SUMATRIPTAN 100 MG/1
TABLET, FILM COATED ORAL
Qty: 18 TABLET | Refills: 1 | Status: SHIPPED | OUTPATIENT
Start: 2023-12-31

## 2024-01-04 ENCOUNTER — HOSPITAL ENCOUNTER (OUTPATIENT)
Age: 49
Discharge: HOME | End: 2024-01-04
Payer: COMMERCIAL

## 2024-01-04 VITALS
RESPIRATION RATE: 16 BRPM | DIASTOLIC BLOOD PRESSURE: 90 MMHG | WEIGHT: 175 LBS | BODY MASS INDEX: 27.47 KG/M2 | SYSTOLIC BLOOD PRESSURE: 143 MMHG | OXYGEN SATURATION: 98 % | HEIGHT: 67 IN | TEMPERATURE: 97 F | HEART RATE: 58 BPM

## 2024-01-04 DIAGNOSIS — H92.01 RIGHT EAR PAIN: Primary | ICD-10-CM

## 2024-01-04 NOTE — ED INITIAL ASSESSMENT (HPI)
Right ear pain x 1 week. Pt was seen at another IC and prescribed drops and is on day 8 of them.

## 2024-01-04 NOTE — DISCHARGE INSTRUCTIONS
May use over-the-counter Flonase nasal spray to help decrease inflammation of the sinus throat and ears.  Also try over-the-counter antihistamine such as Zyrtec Allegra Claritin or Xyzal.  Follow-up with either your primary care physician or ENT in 1 week if symptoms do not improve.

## 2024-01-04 NOTE — ED PROVIDER NOTES
Patient Seen in: [facility]      History     Chief Complaint   Patient presents with    Ear Pain     Stated Complaint: ear pain    Subjective:   48-year-old female presents today with complaints of pain to the right ear.  Denies any dizziness or nausea vomiting.  Denies any recent URI symptoms, headache or fever or chills.  Patient is alert oriented x 3.  Recently was treated for otitis externa and given prescription for neomycin OT.  States has been on this for about 1 week and still having no improvement of symptoms.  Patient is alert oriented x 3.  No other symptoms or concerns.  The patient's medication list, past medical history and social history elements as listed in today's nurse's notes were reviewed and agreed (except as otherwise stated in the HPI).  The patient's family history reviewed and determined to be noncontributory to the presenting problem            Objective:   Past Medical History:   Diagnosis Date    Acne     Anxiety state, unspecified     Back pain     L4-5 and L5-S1 lami in     Decorative tattoo     Headache disorder     High cholesterol     Hypothyroidism (acquired)     Migraine     MTHFR (methylene THF reductase) deficiency and homocystinuria (HCC)     OTHER DISEASES     mthfr clotting disorder    Other malaise and fatigue     Weight gain               Past Surgical History:   Procedure Laterality Date    BACK SURGERY            LASER SURGERY OF EYE  2006    corneal LASIK    ORAL SURGERY PROCEDURE  18yo    wisdom teeth extraction    SPINE SURGERY PROCEDURE UNLISTED      TUBAL LIGATION                  Social History     Socioeconomic History    Marital status:     Number of children: 3   Tobacco Use    Smoking status: Never    Smokeless tobacco: Never   Vaping Use    Vaping Use: Never used   Substance and Sexual Activity    Alcohol use: No    Drug use: No    Sexual activity: Yes     Partners: Male   Other Topics Concern     Caffeine Concern No    Exercise Yes   Social History Narrative    ** Merged History Encounter **                   Review of Systems    Positive for stated complaint: ear pain  Other systems are as noted in HPI.  Constitutional and vital signs reviewed.      All other systems reviewed and negative except as noted above.    Physical Exam     ED Triage Vitals [01/04/24 1718]   /90   Pulse 58   Resp 16   Temp 97.2 °F (36.2 °C)   Temp src Temporal   SpO2 98 %   O2 Device None (Room air)       Current:/90   Pulse 58   Temp 97.2 °F (36.2 °C) (Temporal)   Resp 16   Ht 170.2 cm (5' 7\")   Wt 79.4 kg   LMP 12/13/2023 (Approximate)   SpO2 98%   BMI 27.41 kg/m²         Physical Exam  Vitals and nursing note reviewed.   Constitutional:       Appearance: Normal appearance.   HENT:      Head: Normocephalic.      Right Ear: Ear canal normal. A middle ear effusion is present.      Left Ear: Tympanic membrane normal.   Cardiovascular:      Rate and Rhythm: Normal rate.   Pulmonary:      Effort: Pulmonary effort is normal.   Musculoskeletal:      Cervical back: Normal range of motion and neck supple.   Skin:     General: Skin is warm and dry.   Neurological:      Mental Status: She is alert and oriented to person, place, and time.               ED Course   Labs Reviewed - No data to display                   MDM     Please note that this report has been produced using speech recognition software and may contain errors related to that system including, but not limited to, errors in grammar, punctuation, and spelling, as well as words and phrases that possibly may have been recognized inappropriately.  If there are any questions or concerns, contact the dictating provider for clarification.        Note to patient: The 21st Century Cures Act makes medical notes like these available to patients in the interest of transparency. However, this is a medical document intended as peer to peer communication. It is written in  medical language and may contain abbreviations or verbiage that are unfamiliar. It may appear blunt or direct. Medical documents are intended to carry relevant information, facts as evident, and the clinical opinion of the practitioner.                                   Medical Decision Making  Differential diagnosis includes but is not limited to: Otitis media, otitis externa, strep throat, eustachian tube dysfunction, mastoiditis, TMJ      Patient presents today with complaints of pain to the right ear.  No headaches dizziness denies any nausea vomiting.  No fever or chills.  No recent URI symptoms.  Recently treated for otitis externa with neomycin OT without much relief.  States has been taken drops for about a week patient concerned she may have an inner ear infection.  On exam bilateral TM appear to be nonerythematous.  Possible small amount of fluid behind the right TM.  Patient encouraged to use over-the-counter Flonase as well as antihistamine.  To follow-up with primary care physician or ENT if symptoms do not improve.  Instructed to take over-the-counter NSAID and Tylenol for pain and swelling.  Patient verbalized understanding and agreed to plan of care.    Risk  OTC drugs.        Disposition and Plan     Clinical Impression:  1. Right ear pain         Disposition:  Discharge  1/4/2024  5:32 pm    Follow-up:  Brigitte Matute MD  [address] [place] [address]  [phone]      As needed          Medications Prescribed:  Current Discharge Medication List

## 2024-01-19 ENCOUNTER — PATIENT MESSAGE (OUTPATIENT)
Dept: INTERNAL MEDICINE CLINIC | Facility: CLINIC | Age: 49
End: 2024-01-19

## 2024-01-19 DIAGNOSIS — E66.09 CLASS 1 OBESITY DUE TO EXCESS CALORIES WITHOUT SERIOUS COMORBIDITY IN ADULT, UNSPECIFIED BMI: ICD-10-CM

## 2024-01-19 DIAGNOSIS — E78.5 DYSLIPIDEMIA: ICD-10-CM

## 2024-01-19 DIAGNOSIS — T38.0X5D ADVERSE EFFECT OF GLUCOCORTICOID OR SYNTHETIC ANALOGUE, SUBSEQUENT ENCOUNTER: ICD-10-CM

## 2024-01-19 DIAGNOSIS — L70.9 ACNE, UNSPECIFIED ACNE TYPE: ICD-10-CM

## 2024-01-19 DIAGNOSIS — R79.89 ABNORMAL CORTISOL LEVEL: ICD-10-CM

## 2024-01-19 DIAGNOSIS — E03.9 HYPOTHYROIDISM (ACQUIRED): Primary | ICD-10-CM

## 2024-01-22 NOTE — TELEPHONE ENCOUNTER
From: Arely Harris  To: Brigitte Barriga  Sent: 1/19/2024 1:40 PM CST  Subject: Upcoming referrals     Good afternoon,     I have 3 upcoming appts what I was told I need referrals for. I am seeing:    Dr. Cortez- weight mgt on 2/8  Dr. Holman - endocrinology on 2/15  Dr Amy Ng (her PA Florian Talbert) - derm on 3/5    Thank you so much for your help. Please let me know if there’s anything else you need from me.     Have a nice day,  Arely

## 2024-02-12 ENCOUNTER — HOSPITAL ENCOUNTER (OUTPATIENT)
Age: 49
Discharge: HOME | End: 2024-02-12
Payer: COMMERCIAL

## 2024-02-12 VITALS
OXYGEN SATURATION: 97 % | RESPIRATION RATE: 22 BRPM | TEMPERATURE: 98 F | HEART RATE: 78 BPM | SYSTOLIC BLOOD PRESSURE: 134 MMHG | DIASTOLIC BLOOD PRESSURE: 97 MMHG

## 2024-02-12 DIAGNOSIS — Z11.52 ENCOUNTER FOR SCREENING LABORATORY TESTING FOR COVID-19 VIRUS: Primary | ICD-10-CM

## 2024-02-12 DIAGNOSIS — U07.1 COVID-19: ICD-10-CM

## 2024-02-12 LAB — SARS-COV-2 RNA RESP QL NAA+PROBE: DETECTED

## 2024-02-12 PROCEDURE — 99213 OFFICE O/P EST LOW 20 MIN: CPT | Performed by: NURSE PRACTITIONER

## 2024-02-12 PROCEDURE — U0002 COVID-19 LAB TEST NON-CDC: HCPCS | Performed by: NURSE PRACTITIONER

## 2024-02-12 NOTE — DISCHARGE INSTRUCTIONS
COVID test is positive.  Continue over-the-counter medications for symptom control.  Strict quarantine through Wednesday, then wear a mask while out in public for the next 5 days.  Wash your hands often.  Follow-up with your primary doctor.

## 2024-02-12 NOTE — ED PROVIDER NOTES
Patient Seen in: [facility]      History     Chief Complaint   Patient presents with    Covid-19 Test     Exposure and symptoms - Entered by patient     Stated Complaint: Covid-19 Test - Exposure and symptoms    Subjective:   48-year-old female with hypothyroidism presents from home with request for COVID testing.  States 3 days of flulike symptoms.  Initially started with chills and bodyaches.  The next day she developed cough and stuffy nose with fever of 101.  No throat pain.  No COVID testing done at home.  She has been taking Advil, Tylenol, DayQuil, Delsym at home.  She is vaccinated for COVID.  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    The history is provided by the patient. No  was used.         Objective:   Past Medical History:   Diagnosis Date    Acne     Anxiety state, unspecified     Back pain     L4-5 and L5-S1 lami in     Decorative tattoo     Headache disorder     High cholesterol     Hypothyroidism (acquired)     Migraine     MTHFR (methylene THF reductase) deficiency and homocystinuria (HCC)     OTHER DISEASES     mthfr clotting disorder    Other malaise and fatigue     Weight gain               Past Surgical History:   Procedure Laterality Date    BACK SURGERY            LASER SURGERY OF EYE  2006    corneal LASIK    ORAL SURGERY PROCEDURE  20yo    wisdom teeth extraction    SPINE SURGERY PROCEDURE UNLISTED      TUBAL LIGATION                  Social History     Socioeconomic History    Marital status:     Number of children: 3   Tobacco Use    Smoking status: Never    Smokeless tobacco: Never   Vaping Use    Vaping Use: Never used   Substance and Sexual Activity    Alcohol use: No    Drug use: No    Sexual activity: Yes     Partners: Male   Other Topics Concern    Caffeine Concern No    Exercise Yes   Social History Narrative    ** Merged History Encounter **                    Review of Systems    Positive for stated complaint: Covid-19 Test - Exposure and symptoms  Other systems are as noted in HPI.  Constitutional and vital signs reviewed.      All other systems reviewed and negative except as noted above.    Physical Exam     ED Triage Vitals [02/12/24 1243]   BP (!) 134/97   Pulse 78   Resp 22   Temp 98 °F (36.7 °C)   Temp src Temporal   SpO2 97 %   O2 Device None (Room air)       Current:BP (!) 134/97   Pulse 78   Temp 98 °F (36.7 °C) (Temporal)   Resp 22   LMP 01/25/2024 (Approximate)   SpO2 97%         Physical Exam  Vitals and nursing note reviewed.   Constitutional:       General: She is not in acute distress.     Appearance: Normal appearance. She is not ill-appearing or toxic-appearing.   HENT:      Head: Normocephalic and atraumatic.      Right Ear: Tympanic membrane, ear canal and external ear normal.      Left Ear: Tympanic membrane, ear canal and external ear normal.      Nose: Nose normal.      Mouth/Throat:      Mouth: Mucous membranes are moist.      Pharynx: Oropharynx is clear. No pharyngeal swelling or posterior oropharyngeal erythema.      Tonsils: No tonsillar exudate.   Eyes:      Pupils: Pupils are equal, round, and reactive to light.   Cardiovascular:      Rate and Rhythm: Normal rate and regular rhythm.      Pulses: Normal pulses.   Pulmonary:      Effort: Pulmonary effort is normal. No respiratory distress.      Breath sounds: Normal breath sounds.      Comments: Lungs clear.  No adventitious lung sounds.  No distress.  No hypoxia.  Pulse ox 97% ra. Which is normal    Abdominal:      General: Abdomen is flat.      Palpations: Abdomen is soft.   Musculoskeletal:         General: No signs of injury. Normal range of motion.      Cervical back: Normal range of motion and neck supple.   Lymphadenopathy:      Cervical: No cervical adenopathy.   Skin:     General: Skin is warm and dry.      Capillary Refill: Capillary refill takes less than 2 seconds.    Neurological:      General: No focal deficit present.      Mental Status: She is alert and oriented to person, place, and time.      GCS: GCS eye subscore is 4. GCS verbal subscore is 5. GCS motor subscore is 6.   Psychiatric:         Mood and Affect: Mood normal.         Behavior: Behavior normal.         Thought Content: Thought content normal.         Judgment: Judgment normal.           ED Course     Labs Reviewed   RAPID SARS-COV-2 BY PCR - Abnormal; Notable for the following components:       Result Value    Rapid SARS-CoV-2 by PCR Detected (*)     All other components within normal limits     Recent Results (from the past 24 hour(s))   Rapid SARS-CoV-2 by PCR    Collection Time: 02/12/24  1:03 PM    Specimen: Nares; Other   Result Value Ref Range    Rapid SARS-CoV-2 by PCR Detected (A) Not Detected       MDM          Medical Decision Making  COVID 19  Flulike symptoms for 3 days  Differential diagnosis: COVID, flu, other viral syndrome  COVID-positive  Deferred flu symptoms  Nontoxic-appearing on exam.  No distress.  No hypoxia.  Pulse ox 97% room air which is normal  Discussed viral origin of COVID.  Discussed the option of Paxlovid but patient declined -mild symptoms, low risk, no comorbidities  Stressed quarantine.  May continue to use over-the-counter remedies for symptom control    Results and plan of care discussed with the patient/family. They are in agreement with discharge. They understand to follow up with their primary doctor or the referral physician for further evaluation, especially if no improvement.  Also discussed the limitations of immediate care, patient is aware that if symptoms are worse they should go to the emergency room. Verbal and written discharge instructions were given.       Problems Addressed:  COVID-19: acute illness or injury  Encounter for screening laboratory testing for COVID-19 virus: acute illness or injury    Amount and/or Complexity of Data Reviewed  Labs: ordered.  Decision-making details documented in ED Course.    Risk  OTC drugs.        Disposition and Plan     Clinical Impression:  1. Encounter for screening laboratory testing for COVID-19 virus    2. COVID-19         Disposition:  Discharge  2/12/2024  1:16 pm    Follow-up:  Feroz Steven MD  [address] [place] [id number]  [phone]                Medications Prescribed:  Discharge Medication List as of 2/12/2024  1:21 PM

## 2024-02-27 ENCOUNTER — OFFICE VISIT (OUTPATIENT)
Dept: INTERNAL MEDICINE CLINIC | Facility: CLINIC | Age: 49
End: 2024-02-27
Payer: COMMERCIAL

## 2024-02-27 VITALS
HEART RATE: 58 BPM | SYSTOLIC BLOOD PRESSURE: 126 MMHG | DIASTOLIC BLOOD PRESSURE: 74 MMHG | RESPIRATION RATE: 16 BRPM | HEIGHT: 67 IN | WEIGHT: 189 LBS | BODY MASS INDEX: 29.66 KG/M2 | OXYGEN SATURATION: 97 %

## 2024-02-27 DIAGNOSIS — R94.31 ABNORMAL ECG: ICD-10-CM

## 2024-02-27 DIAGNOSIS — E66.3 OVERWEIGHT (BMI 25.0-29.9): ICD-10-CM

## 2024-02-27 DIAGNOSIS — Z51.81 ENCOUNTER FOR THERAPEUTIC DRUG MONITORING: Primary | ICD-10-CM

## 2024-02-27 PROCEDURE — 3078F DIAST BP <80 MM HG: CPT | Performed by: INTERNAL MEDICINE

## 2024-02-27 PROCEDURE — 3008F BODY MASS INDEX DOCD: CPT | Performed by: INTERNAL MEDICINE

## 2024-02-27 PROCEDURE — 99214 OFFICE O/P EST MOD 30 MIN: CPT | Performed by: INTERNAL MEDICINE

## 2024-02-27 PROCEDURE — 3074F SYST BP LT 130 MM HG: CPT | Performed by: INTERNAL MEDICINE

## 2024-02-27 RX ORDER — PHENTERMINE HYDROCHLORIDE 37.5 MG/1
18.75 TABLET ORAL
Qty: 30 TABLET | Refills: 1 | Status: SHIPPED | OUTPATIENT
Start: 2024-02-27

## 2024-02-27 NOTE — PROGRESS NOTES
HISTORY OF PRESENT ILLNESS  Chief Complaint   Patient presents with    Weight Check     -6lbs       Arely Harris is a 48 year old female here for follow up in medical weight loss program.     Denies chest pain, shortness of breath, dizziness, blurred vision, headache, paresthesia, nausea/vomiting.   Down 6 lb   No new or different side effects   Was sick this past month and dad was also ill   Trying to do more fruits and vegetables and eating more fruit   Protein bar and black coffee   Does uncrustable and fruit   Dinner: enchilada, smaller protion sizes   Has been more active and walking at lunch   Started with body weight strength / planks / push ups when she is can           Wt Readings from Last 6 Encounters:   02/27/24 189 lb (85.7 kg)   01/04/24 175 lb (79.4 kg)   11/09/23 170 lb (77.1 kg)   10/17/23 185 lb (83.9 kg)   08/24/23 195 lb (88.5 kg)   06/14/23 191 lb 3.2 oz (86.7 kg)            Breakfast Lunch Dinner Snacks Fluids   reviewed           REVIEW OF SYSTEMS  GENERAL HEALTH: feels well otherwise, denied any fevers chills or night sweats   RESPIRATORY: denies shortness of breath   CARDIOVASCULAR: denies chest pain  GI: denies abdominal pain    EXAM  /74   Pulse 58   Resp 16   Ht 5' 7\" (1.702 m)   Wt 189 lb (85.7 kg)   LMP 02/26/2024 (Approximate)   SpO2 97%   BMI 29.60 kg/m²   GENERAL: well developed, well nourished,in no apparent distress, A/O x3  SKIN: no rashes,no suspicious lesions  HEENT: atraumatic, normocephalic, OP-clear, PERRL  NECK: supple,no adenopathy  LUNGS: clear to auscultation bilaterally   CARDIO: RRR without murmur  GI: good BS's,NT/ND, no masses or HSM  EXTREMITIES: no cyanosis, no clubbing, no edema    Lab Results   Component Value Date    WBC 6.0 05/27/2023    RBC 4.79 05/27/2023    HGB 15.0 05/27/2023    HCT 44.2 05/27/2023    MCV 92.3 05/27/2023    MCH 31.3 05/27/2023    MCHC 33.9 05/27/2023    RDW 12.1 05/27/2023    .0 05/27/2023    MPV 10.5 (H) 10/22/2011      Lab Results   Component Value Date    GLU 94 05/27/2023    BUN 16 05/27/2023    BUNCREA 16.3 03/27/2021    CREATSERUM 0.88 05/27/2023    ANIONGAP 3 05/27/2023    GFR 85 12/12/2017    GFRNAA 82 03/27/2021    GFRAA 95 03/27/2021    CA 9.3 05/27/2023    OSMOCALC 287 05/27/2023    ALKPHO 99 05/27/2023    AST 24 05/27/2023    ALT 28 05/27/2023    BILT 0.6 05/27/2023    TP 7.3 05/27/2023    ALB 3.8 05/27/2023    GLOBULIN 3.5 05/27/2023    AGRATIO 1.5 01/12/2016     05/27/2023    K 4.7 05/27/2023     05/27/2023    CO2 27.0 05/27/2023     Lab Results   Component Value Date     02/24/2023    A1C 5.4 02/24/2023     Lab Results   Component Value Date    CHOLEST 213 (H) 05/27/2023    TRIG 101 05/27/2023    HDL 52 05/27/2023     (H) 05/27/2023    VLDL 19 05/27/2023    TCHDLRATIO 2.64 04/10/2018    NONHDLC 161 (H) 05/27/2023     Lab Results   Component Value Date    T4F 1.1 02/24/2023    TSH 0.584 02/24/2023    TSHT4 0.58 12/15/2015     Lab Results   Component Value Date    B12 586 03/27/2021     Lab Results   Component Value Date    VITD 26.3 (L) 03/27/2021       Current Outpatient Medications on File Prior to Visit   Medication Sig Dispense Refill    SUMAtriptan Succinate 100 MG Oral Tab TAKE 1 TABLET (100 MG TOTAL) BY MOUTH AS NEEDED. MAY REPEAT IN 2 HOURS MAX 2 IN 24 HOURS 18 tablet 1    Phentermine HCl 37.5 MG Oral Tab Take 1 tablet (37.5 mg total) by mouth every morning before breakfast. 30 tablet 1    Azelaic Acid 15 % External Gel Apply 1 Application topically every morning.      sertraline 50 MG Oral Tab Take 1 tablet (50 mg total) by mouth daily. 90 tablet 3    levothyroxine 175 MCG Oral Tab Take 1 tablet (175 mcg total) by mouth every morning before breakfast. 90 tablet 3    spironolactone 50 MG Oral Tab TAKE 1 TABLET BY MOUTH TWICE DAILY (Patient taking differently: Take 1 tablet (50 mg total) by mouth daily. TAKE 1 TABLET BY MOUTH TWICE DAILY) 180 tablet 1    ibuprofen (MOTRIN) 400 MG  Oral Tab Take 1 tablet (400 mg total) by mouth every 6 (six) hours as needed for Pain.      Multiple Vitamins-Minerals (MULTIVITAMIN & MINERAL OR) Take  by mouth daily.      PEG 3350-KCl-Na Bicarb-NaCl 420 g Oral Recon Soln Take as directed by physician 4000 mL 0     No current facility-administered medications on file prior to visit.       ASSESSMENT  Analyzed weight data:       Diagnoses and all orders for this visit:    Encounter for therapeutic drug monitoring  -     EKG 12 Lead performed at [facility]; Future  -     Phentermine HCl 37.5 MG Oral Tab; Take 0.5 tablets (18.75 mg total) by mouth every morning before breakfast.    Abnormal ECG  -     EKG 12 Lead performed at [facility]; Future  -     Phentermine HCl 37.5 MG Oral Tab; Take 0.5 tablets (18.75 mg total) by mouth every morning before breakfast.    Overweight (BMI 25.0-29.9)  -     Phentermine HCl 37.5 MG Oral Tab; Take 0.5 tablets (18.75 mg total) by mouth every morning before breakfast.            PLAN  Reconsult at 195 lb on 8/24/23   Total time spent on chart review, pre-charting, obtaining history, counseling, and educating, reviewing labs was 30 minutes.    Continue phentermine at 1/2 tablet per day   -advised of side effects and adverse effects of this medication  Never completed stress testing   Reviewed repeat baseline ecg to review no changes  Reviewed importance of cardiac eval while taking this medication   Reviewed no wegovy coverage  Needs to complete ecg   Nutrition: low carb diet/ recommended to eat breakfast daily/ regular protein intake  Medication use and side effects reviewed with patient.  Medication contraindications: n/a   Follow up with dietitian and psychologist as recommended.  Discussed the role of sleep and stress in weight management.  Counseled on comprehensive weight loss plan including attention to nutrition, exercise and behavior/stress management for success. See patient instruction below for more  details.  Discussed strategies to overcome barriers to successful weight loss and weight maintenance  FITTE: ACSM recommendations (150-300 minutes/ week in active weight loss)   Weight Loss consent to treat reviewed and signed n/a    There are no Patient Instructions on file for this visit.    No follow-ups on file.    Patient verbalizes understanding.    Valerie Cortez MD      Answers submitted by the patient for this visit:  Medical Weight Loss Follow Up (Submitted on 2/26/2024)  If greater than 5/1O how would you grade your coping mechanisms?: moderate

## 2024-04-23 ENCOUNTER — EKG ENCOUNTER (OUTPATIENT)
Dept: LAB | Age: 49
End: 2024-04-23
Attending: INTERNAL MEDICINE
Payer: COMMERCIAL

## 2024-04-23 DIAGNOSIS — Z51.81 ENCOUNTER FOR THERAPEUTIC DRUG MONITORING: ICD-10-CM

## 2024-04-23 DIAGNOSIS — R94.31 ABNORMAL ECG: ICD-10-CM

## 2024-04-23 LAB
ATRIAL RATE: 58 BPM
P AXIS: 33 DEGREES
P-R INTERVAL: 156 MS
Q-T INTERVAL: 430 MS
QRS DURATION: 90 MS
QTC CALCULATION (BEZET): 422 MS
R AXIS: 19 DEGREES
T AXIS: 69 DEGREES
VENTRICULAR RATE: 58 BPM

## 2024-04-23 PROCEDURE — 93005 ELECTROCARDIOGRAM TRACING: CPT

## 2024-04-23 PROCEDURE — 93010 ELECTROCARDIOGRAM REPORT: CPT | Performed by: INTERNAL MEDICINE

## 2024-05-01 ENCOUNTER — HOSPITAL ENCOUNTER (OUTPATIENT)
Dept: CV DIAGNOSTICS | Age: 49
Discharge: HOME | End: 2024-05-01
Attending: INTERNAL MEDICINE
Payer: COMMERCIAL

## 2024-05-01 DIAGNOSIS — E78.5 DYSLIPIDEMIA: ICD-10-CM

## 2024-05-01 DIAGNOSIS — E72.11 MTHFR (METHYLENE THF REDUCTASE) DEFICIENCY AND HOMOCYSTINURIA (HCC): ICD-10-CM

## 2024-05-01 DIAGNOSIS — R94.31 ABNORMAL ECG: ICD-10-CM

## 2024-05-01 DIAGNOSIS — Z51.81 ENCOUNTER FOR THERAPEUTIC DRUG MONITORING: ICD-10-CM

## 2024-05-01 DIAGNOSIS — E72.12 MTHFR (METHYLENE THF REDUCTASE) DEFICIENCY AND HOMOCYSTINURIA (HCC): ICD-10-CM

## 2024-05-01 DIAGNOSIS — E03.9 HYPOTHYROIDISM (ACQUIRED): ICD-10-CM

## 2024-05-01 PROCEDURE — 93018 CV STRESS TEST I&R ONLY: CPT | Performed by: INTERNAL MEDICINE

## 2024-05-01 PROCEDURE — 93350 STRESS TTE ONLY: CPT | Performed by: INTERNAL MEDICINE

## 2024-05-01 PROCEDURE — 93017 CV STRESS TEST TRACING ONLY: CPT | Performed by: INTERNAL MEDICINE

## 2024-05-17 ENCOUNTER — LAB ENCOUNTER (OUTPATIENT)
Dept: LAB | Age: 49
End: 2024-05-17
Attending: INTERNAL MEDICINE

## 2024-05-17 DIAGNOSIS — E03.9 HYPOTHYROIDISM (ACQUIRED): Primary | ICD-10-CM

## 2024-05-17 LAB
T3FREE SERPL-MCNC: 2.55 PG/ML (ref 2.4–4.2)
T4 FREE SERPL-MCNC: 1.5 NG/DL (ref 0.8–1.7)
TSI SER-ACNC: 0.34 MIU/ML (ref 0.36–3.74)

## 2024-05-17 PROCEDURE — 84439 ASSAY OF FREE THYROXINE: CPT

## 2024-05-17 PROCEDURE — 84481 FREE ASSAY (FT-3): CPT

## 2024-05-17 PROCEDURE — 36415 COLL VENOUS BLD VENIPUNCTURE: CPT

## 2024-05-17 PROCEDURE — 84443 ASSAY THYROID STIM HORMONE: CPT

## 2024-06-03 ENCOUNTER — TELEMEDICINE (OUTPATIENT)
Dept: INTERNAL MEDICINE CLINIC | Facility: CLINIC | Age: 49
End: 2024-06-03
Payer: COMMERCIAL

## 2024-06-03 DIAGNOSIS — G43.009 MIGRAINE WITHOUT AURA AND WITHOUT STATUS MIGRAINOSUS, NOT INTRACTABLE: ICD-10-CM

## 2024-06-03 DIAGNOSIS — Z51.81 ENCOUNTER FOR THERAPEUTIC DRUG MONITORING: Primary | ICD-10-CM

## 2024-06-03 DIAGNOSIS — E78.5 DYSLIPIDEMIA: ICD-10-CM

## 2024-06-03 DIAGNOSIS — E66.3 OVERWEIGHT (BMI 25.0-29.9): ICD-10-CM

## 2024-06-03 DIAGNOSIS — E03.9 HYPOTHYROIDISM (ACQUIRED): ICD-10-CM

## 2024-06-03 RX ORDER — TOPIRAMATE 25 MG/1
25 TABLET ORAL DAILY
Qty: 30 TABLET | Refills: 2 | Status: SHIPPED | OUTPATIENT
Start: 2024-06-03

## 2024-06-03 RX ORDER — PHENTERMINE HYDROCHLORIDE 37.5 MG/1
37.5 TABLET ORAL
Qty: 30 TABLET | Refills: 2 | Status: SHIPPED | OUTPATIENT
Start: 2024-06-03

## 2024-06-03 NOTE — PROGRESS NOTES
HISTORY OF PRESENT ILLNESS  Chief Complaint   Patient presents with    Weight Check     Video         Arely Harirs is a 48 year old female here for follow up in medical weight loss program.     Denies chest pain, shortness of breath, dizziness, blurred vision, headache, paresthesia, nausea/vomiting.   Struggling with weight and night cravings   Feels that she does well with lunch and struggling with dinner and after     Reviewed 24 dietary recall   Starts protein bar / veggie / protein     Currently 182 lb       Wt Readings from Last 6 Encounters:   02/27/24 189 lb (85.7 kg)   01/04/24 175 lb (79.4 kg)   11/09/23 170 lb (77.1 kg)   10/17/23 185 lb (83.9 kg)   08/24/23 195 lb (88.5 kg)   06/14/23 191 lb 3.2 oz (86.7 kg)            Breakfast Lunch Dinner Snacks Fluids   reviewed           REVIEW OF SYSTEMS  GENERAL HEALTH: feels well otherwise, denied any fevers chills or night sweats   RESPIRATORY: denies shortness of breath   CARDIOVASCULAR: denies chest pain  GI: denies abdominal pain    EXAM  LMP 02/26/2024 (Approximate)   EXAM  Reviewed most recent set of vitals   Physical Exam:  alert, appears stated age and cooperative, Normocephalic, without obvious abnormality, atraumatic, lips, mucosa, and tongue normal; teeth and gums normal, Speaking in full sentences comfortably, Normal work of breathing, Skin color, texture, turgor normal. No rashes or lesions and age appropriate, normal, logical connections and person, place and time/date        Lab Results   Component Value Date    WBC 6.0 05/27/2023    RBC 4.79 05/27/2023    HGB 15.0 05/27/2023    HCT 44.2 05/27/2023    MCV 92.3 05/27/2023    MCH 31.3 05/27/2023    MCHC 33.9 05/27/2023    RDW 12.1 05/27/2023    .0 05/27/2023    MPV 10.5 (H) 10/22/2011     Lab Results   Component Value Date    GLU 94 05/27/2023    BUN 16 05/27/2023    BUNCREA 16.3 03/27/2021    CREATSERUM 0.88 05/27/2023    ANIONGAP 3 05/27/2023    GFR 85 12/12/2017    GFRNAA 82 03/27/2021     GFRAA 95 03/27/2021    CA 9.3 05/27/2023    OSMOCALC 287 05/27/2023    ALKPHO 99 05/27/2023    AST 24 05/27/2023    ALT 28 05/27/2023    BILT 0.6 05/27/2023    TP 7.3 05/27/2023    ALB 3.8 05/27/2023    GLOBULIN 3.5 05/27/2023    AGRATIO 1.5 01/12/2016     05/27/2023    K 4.7 05/27/2023     05/27/2023    CO2 27.0 05/27/2023     Lab Results   Component Value Date     02/24/2023    A1C 5.4 02/24/2023     Lab Results   Component Value Date    CHOLEST 213 (H) 05/27/2023    TRIG 101 05/27/2023    HDL 52 05/27/2023     (H) 05/27/2023    VLDL 19 05/27/2023    TCHDLRATIO 2.64 04/10/2018    NONHDLC 161 (H) 05/27/2023     Lab Results   Component Value Date    T4F 1.5 05/17/2024    TSH 0.340 (L) 05/17/2024    TSHT4 0.58 12/15/2015     Lab Results   Component Value Date    B12 586 03/27/2021     Lab Results   Component Value Date    VITD 26.3 (L) 03/27/2021       Current Outpatient Medications on File Prior to Visit   Medication Sig Dispense Refill    SUMAtriptan Succinate 100 MG Oral Tab TAKE 1 TABLET (100 MG TOTAL) BY MOUTH AS NEEDED. MAY REPEAT IN 2 HOURS MAX 2 IN 24 HOURS 18 tablet 1    PEG 3350-KCl-Na Bicarb-NaCl 420 g Oral Recon Soln Take as directed by physician 4000 mL 0    Azelaic Acid 15 % External Gel Apply 1 Application topically every morning.      sertraline 50 MG Oral Tab Take 1 tablet (50 mg total) by mouth daily. 90 tablet 3    levothyroxine 175 MCG Oral Tab Take 1 tablet (175 mcg total) by mouth every morning before breakfast. 90 tablet 3    spironolactone 50 MG Oral Tab TAKE 1 TABLET BY MOUTH TWICE DAILY (Patient taking differently: Take 1 tablet (50 mg total) by mouth daily. TAKE 1 TABLET BY MOUTH TWICE DAILY) 180 tablet 1    ibuprofen (MOTRIN) 400 MG Oral Tab Take 1 tablet (400 mg total) by mouth every 6 (six) hours as needed for Pain.      Multiple Vitamins-Minerals (MULTIVITAMIN & MINERAL OR) Take  by mouth daily.       No current facility-administered medications on file  prior to visit.       ASSESSMENT  Analyzed weight data:       Diagnoses and all orders for this visit:    Encounter for therapeutic drug monitoring  -     Phentermine HCl 37.5 MG Oral Tab; Take 1 tablet (37.5 mg total) by mouth every morning before breakfast.  -     topiramate (TOPAMAX) 25 MG Oral Tab; Take 1 tablet (25 mg total) by mouth daily.    Overweight (BMI 25.0-29.9)  -     Phentermine HCl 37.5 MG Oral Tab; Take 1 tablet (37.5 mg total) by mouth every morning before breakfast.  -     topiramate (TOPAMAX) 25 MG Oral Tab; Take 1 tablet (25 mg total) by mouth daily.    Hypothyroidism (acquired)  -     Phentermine HCl 37.5 MG Oral Tab; Take 1 tablet (37.5 mg total) by mouth every morning before breakfast.  -     topiramate (TOPAMAX) 25 MG Oral Tab; Take 1 tablet (25 mg total) by mouth daily.    Dyslipidemia  -     Phentermine HCl 37.5 MG Oral Tab; Take 1 tablet (37.5 mg total) by mouth every morning before breakfast.  -     topiramate (TOPAMAX) 25 MG Oral Tab; Take 1 tablet (25 mg total) by mouth daily.    Migraine without aura and without status migrainosus, not intractable  -     Phentermine HCl 37.5 MG Oral Tab; Take 1 tablet (37.5 mg total) by mouth every morning before breakfast.  -     topiramate (TOPAMAX) 25 MG Oral Tab; Take 1 tablet (25 mg total) by mouth daily.              PLAN  Reconsult at 195 lb on 8/24/23   Total time spent on chart review, pre-charting, obtaining history, counseling, and educating, reviewing labs was 30 minutes.  Continue phentermine increase to bid   Reviewed 24 dietary recall   Reviewed stress test  Increase strength training   -advised of side effects and adverse effects of this medication  Add topamax 25 mg q day   -advised of side effects and adverse effects of this medication  History of migraines   No history of renal stones and had tubal ligation.  Reviewed no wegovy coverage  Needs to complete ecg   Nutrition: low carb diet/ recommended to eat breakfast daily/ regular  protein intake  Medication use and side effects reviewed with patient.  Medication contraindications: n/a   Follow up with dietitian and psychologist as recommended.  Discussed the role of sleep and stress in weight management.  Counseled on comprehensive weight loss plan including attention to nutrition, exercise and behavior/stress management for success. See patient instruction below for more details.  Discussed strategies to overcome barriers to successful weight loss and weight maintenance  FITTE: ACSM recommendations (150-300 minutes/ week in active weight loss)   Weight Loss consent to treat reviewed and signed n/a    There are no Patient Instructions on file for this visit.    No follow-ups on file.    Patient verbalizes understanding.    Valerie Cortez MD      Answers submitted by the patient for this visit:  Medical Weight Loss Follow Up (Submitted on 2/26/2024)  If greater than 5/1O how would you grade your coping mechanisms?: moderate

## 2024-07-07 DIAGNOSIS — E03.9 HYPOTHYROIDISM (ACQUIRED): ICD-10-CM

## 2024-07-07 DIAGNOSIS — F41.9 ANXIETY: ICD-10-CM

## 2024-07-10 NOTE — TELEPHONE ENCOUNTER
Please review. Rx failed/no protocol.    No future appointments with internal medicine or family medicine  Last office visit: 6/14/23  [employer] message sent to patient to schedule an office visit with PCP.   Routed to front office staff to call patient and make an appointment.    Requested Prescriptions   Pending Prescriptions Disp Refills    levothyroxine 175 MCG Oral Tab 90 tablet 3     Sig: Take 1 tablet (175 mcg total) by mouth every morning before breakfast.       Thyroid Medication Protocol Failed - 7/7/2024  9:51 AM        Failed - Last TSH value is normal     Lab Results   Component Value Date    TSH 0.340 (L) 05/17/2024    TSHT4 0.58 12/15/2015                 Passed - TSH in past 12 months        Passed - In person appointment or virtual visit in the past 12 mos or appointment in next 3 mos     Recent Outpatient Visits              1 month ago Encounter for therapeutic drug monitoring    [address] Valerie Cortez MD    Telemedicine    4 months ago Encounter for therapeutic drug monitoring    [address] Valerie Cortez MD    Office Visit    8 months ago Therapeutic drug monitoring    [facility], Virtual Visit Valerie Cortez MD    Telemedicine    10 months ago Encounter for therapeutic drug monitoring    [address] Valerie Cortez MD    Office Visit    1 year ago Hypothyroidism (acquired)    [address] Brigitte Barriga APRN    Office Visit          Future Appointments         Provider Department Appt Notes    In 1 month Valerie Cortez MD [address]     In 4 months Valerie Cortez MD [address]                       sertraline 50 MG Oral Tab 90 tablet 3     Sig: Take 1 tablet (50 mg total) by mouth daily.       Psychiatric Non-Scheduled (Anti-Anxiety)  Failed - 7/7/2024  9:51 AM        Failed - In person appointment or virtual visit in the past 6 mos or appointment in next 3 mos     Recent Outpatient Visits              1 month ago Encounter for therapeutic drug monitoring    [facility], [address] [place] [address] Valerie Paez MD    Telemedicine    4 months ago Encounter for therapeutic drug monitoring    [address] Valerie Cortez MD    Office Visit    8 months ago Therapeutic drug monitoring    [facility], Virtual Visit Valreie Cortez MD    Telemedicine    10 months ago Encounter for therapeutic drug monitoring    [address] Valerie Paez MD    Office Visit    1 year ago Hypothyroidism (acquired)    [address] Brigitte Barriga APRN    Office Visit          Future Appointments         Provider Department Appt Notes    In 1 month Valerie Cortez MD [address]     In 4 months Valerie Cortez MD [address]                     Failed - Depression Screening completed within the past 12 months          SUMAtriptan Succinate 100 MG Oral Tab 18 tablet 1     Sig: TAKE 1 TABLET (100 MG TOTAL) BY MOUTH AS NEEDED. MAY REPEAT IN 2 HOURS MAX 2 IN 24 HOURS       Neurology Medications Failed - 7/7/2024  9:51 AM        Failed - In person appointment or virtual visit in the past 6 mos or appointment in next 3 mos     Recent Outpatient Visits              1 month ago Encounter for therapeutic drug monitoring    [facility], [address] [place] [address] Valerie Paez MD    Telemedicine    4 months ago Encounter for therapeutic drug monitoring    [address] Valerie Cortez MD    Office Visit    8 months ago Therapeutic drug monitoring    [facility], Virtual Visit Valerie Cortez MD     Telemedicine    10 months ago Encounter for therapeutic drug monitoring    [facility], [address] [place] [address] Valerie Cortez MD    Office Visit    1 year ago Hypothyroidism (acquired)    [facility], [address] [place] [address] Brigitte Barriga APRN    Office Visit          Future Appointments         Provider Department Appt Notes    In 1 month Valerie Cortez MD [facility], [address] [place] [address]     In 4 months Valerie Cortez MD [facility], [address] [place] [address]                          Future Appointments         Provider Department Appt Notes    In 1 month Valerie Cortez MD [facility], [address] [place] [address]     In 4 months Valerie Cortez MD [facility], [address] [place] [address]           Recent Outpatient Visits              1 month ago Encounter for therapeutic drug monitoring    [facility], [address] [place] [address] Valerie Cortez MD    Telemedicine    4 months ago Encounter for therapeutic drug monitoring    [facility], [address] [place] [address] Valerie Cortez MD    Office Visit    8 months ago Therapeutic drug monitoring    [facility], Virtual Visit Valerie Cortez MD    Telemedicine    10 months ago Encounter for therapeutic drug monitoring    [facility], [address] [place] [address] Valerie Cortez MD    Office Visit    1 year ago Hypothyroidism (acquired)    [facility], [address] [place] [address] Brigitte Barriga APRN    Office Visit

## 2024-07-11 ENCOUNTER — TELEPHONE (OUTPATIENT)
Dept: INTERNAL MEDICINE CLINIC | Facility: CLINIC | Age: 49
End: 2024-07-11

## 2024-07-11 DIAGNOSIS — Z13.29 SCREENING FOR THYROID DISORDER: ICD-10-CM

## 2024-07-11 DIAGNOSIS — Z13.228 SCREENING FOR METABOLIC DISORDER: ICD-10-CM

## 2024-07-11 DIAGNOSIS — E03.9 HYPOTHYROIDISM (ACQUIRED): Primary | ICD-10-CM

## 2024-07-11 DIAGNOSIS — Z13.220 SCREENING FOR LIPID DISORDERS: ICD-10-CM

## 2024-07-11 DIAGNOSIS — Z00.00 ROUTINE GENERAL MEDICAL EXAMINATION AT A HEALTH CARE FACILITY: ICD-10-CM

## 2024-07-11 DIAGNOSIS — Z13.0 SCREENING FOR DISORDER OF BLOOD AND BLOOD-FORMING ORGANS: ICD-10-CM

## 2024-07-11 DIAGNOSIS — E78.5 DYSLIPIDEMIA: ICD-10-CM

## 2024-07-11 RX ORDER — LEVOTHYROXINE SODIUM 175 UG/1
175 TABLET ORAL
Qty: 90 TABLET | Refills: 0 | Status: SHIPPED | OUTPATIENT
Start: 2024-07-11

## 2024-07-11 RX ORDER — SUMATRIPTAN 100 MG/1
TABLET, FILM COATED ORAL
Qty: 18 TABLET | Refills: 0 | Status: SHIPPED | OUTPATIENT
Start: 2024-07-11

## 2024-07-11 NOTE — TELEPHONE ENCOUNTER
Patient scheduled with JÚNIOR MARTINEZ.    Future Appointments   Date Time Provider Department Center   7/27/2024  8:00 AM Brigitte Barriga APRN [facility]   9/3/2024 12:20 PM Valerie Cortez MD [facility]   12/3/2024 12:20 PM Valerie Cortez MD [facility]

## 2024-07-11 NOTE — TELEPHONE ENCOUNTER
Patient called request labs prior to their annual physical.  Annual physical scheduled for 07/27/24.   Please order labs. Patient preferred lab is [facility].  Patient informed request was sent to clinical team.  Patient informed to fast for labs.  No callback required.

## 2024-07-26 NOTE — PROGRESS NOTES
Arely Harris is a 49 year old female who presents for a complete physical exam:       Patient complains of:    Hypothyroid   follows with Dr Holman.  Levothyroxine     Anxiety  sertraline 50mg  doing well.      Migraines  imitrex PRN  overall stable      Weight loss  Dr Cortez.      Abnormal EKG during screening for medication use in the weight loss clinic.   EKG reviewed.  Stress ECHO completed.   No chest pain or SOB.  She exercises regularly.  She walks 3+miles at a brisk pace 3-4 x weekly without issues.   On the treadmill she is able to walk at a steady incline.  Stress ECHO 5/24 was normal therefore she can proceed with colonoscopy.      Wt Readings from Last 6 Encounters:   07/27/24 179 lb (81.2 kg)   02/27/24 189 lb (85.7 kg)   01/04/24 175 lb (79.4 kg)   11/09/23 170 lb (77.1 kg)   10/17/23 185 lb (83.9 kg)   08/24/23 195 lb (88.5 kg)       Cholesterol, Total (mg/dL)   Date Value   05/27/2023 213 (H)   12/30/2021 243 (H)   03/27/2021 172     CHOLESTEROL, TOTAL (mg/dL)   Date Value   12/15/2015 206 (H)   12/19/2014 205 (H)   07/19/2013 200     HDL Cholesterol (mg/dL)   Date Value   05/27/2023 52   12/30/2021 65 (H)   03/27/2021 79 (H)     HDL CHOLESTEROL (mg/dL)   Date Value   12/15/2015 56   12/19/2014 64   07/19/2013 50     LDL Cholesterol (mg/dL)   Date Value   05/27/2023 143 (H)   12/30/2021 157 (H)   03/27/2021 79     LDL-CHOLESTEROL (mg/dL (calc))   Date Value   12/15/2015 131 (H)   12/19/2014 127   07/19/2013 118     AST (U/L)   Date Value   05/27/2023 24   09/19/2022 27   03/27/2021 19   01/12/2016 26   12/15/2015 19   12/19/2014 19     ALT (U/L)   Date Value   05/27/2023 28   09/19/2022 50   03/27/2021 40   01/12/2016 31 (H)   12/15/2015 24   12/19/2014 20        Current Outpatient Medications   Medication Sig Dispense Refill    Tretinoin 0.1 % External Cream Apply 1 Application topically nightly.      spironolactone 25 MG Oral Tab Take 1 tablet (25 mg total) by mouth one time. One daily with 50  mg to equal 75 mg      sertraline 50 MG Oral Tab Take 1 tablet (50 mg total) by mouth daily. 90 tablet 3    levothyroxine 175 MCG Oral Tab Take 1 tablet (175 mcg total) by mouth every morning before breakfast. 90 tablet 0    SUMAtriptan Succinate 100 MG Oral Tab TAKE 1 TABLET (100 MG TOTAL) BY MOUTH AS NEEDED. MAY REPEAT IN 2 HOURS MAX 2 IN 24 HOURS 18 tablet 0    Phentermine HCl 37.5 MG Oral Tab Take 1 tablet (37.5 mg total) by mouth every morning before breakfast. 30 tablet 2    topiramate (TOPAMAX) 25 MG Oral Tab Take 1 tablet (25 mg total) by mouth daily. 30 tablet 2    Azelaic Acid 15 % External Gel Apply 1 Application topically every morning.      spironolactone 50 MG Oral Tab TAKE 1 TABLET BY MOUTH TWICE DAILY (Patient taking differently: Take 1 tablet (50 mg total) by mouth daily. One tab daily of 50 mg with 25 mg to equal 75 g) 180 tablet 1    ibuprofen (MOTRIN) 400 MG Oral Tab Take 1 tablet (400 mg total) by mouth every 6 (six) hours as needed for Pain.      Multiple Vitamins-Minerals (MULTIVITAMIN & MINERAL OR) Take  by mouth daily.        Past Medical History:    Acne    Anxiety state, unspecified    Back pain    L4-5 and L5-S1 lami in     Decorative tattoo    Headache disorder    High cholesterol    Hypothyroidism (acquired)    Migraine    MTHFR (methylene THF reductase) deficiency and homocystinuria (HCC)    OTHER DISEASES    mthfr clotting disorder    Other malaise and fatigue    Weight gain      Past Surgical History:   Procedure Laterality Date    Back surgery            Laser surgery of eye  2006    corneal LASIK    Oral surgery procedure  20yo    wisdom teeth extraction    Spine surgery procedure unlisted      Tubal ligation        Family History   Problem Relation Age of Onset    Heart Attack Maternal Grandmother     Stroke Maternal Grandmother     Breast Cancer Maternal Grandmother 72    Hypertension Father     Heart Disorder Father     Prostate Cancer Father 78    Cancer  Father 78        LUNG    Other (CAD) Father     Cancer Paternal Grandfather         liver dx age 40-50's    Breast Cancer Paternal Aunt 60        post menopause 60-70 yrs old    Hypertension Mother     Asthma Son     No Known Problems Maternal Grandfather     No Known Problems Paternal Grandmother     No Known Problems Son     No Known Problems Son     Thyroid disease Sister         Hypothyroidism on thyroxine    Diabetes Neg            Health Maintenance  Immunizations:   Immunization History   Administered Date(s) Administered    Covid-19 Vaccine Moderna 100 mcg/0.5 ml 02/16/2021, 03/24/2021    Covid-19 Vaccine Moderna Bivalent 50mcg/0.5mL 12+ years 11/18/2022    FLUZONE 6 months and older PFS 0.5 ml (17777) 11/18/2022    Influenza 10/21/2016, 10/13/2017, 10/12/2018, 10/08/2019, 09/18/2020    MMR 12/11/2022, 01/08/2023    TD 10/25/2006    TDAP 07/03/2017    Tb Intradermal Test 11/10/2015     Dental Visits: yes   Colon cancer screening: has scheduled twice   not completed due to need for stress test.  Negatrive but they are telling her she needs cardiac clearance.  Will provide.    Health Maintenance   Topic Date Due    Colorectal Cancer Screening  Never done      Gyne:     Health Maintenance   Topic Date Due    Pap Smear  01/18/2024            History of dysplasia:  No  Menstrual Cycle: irregualr          LMP: 2 weeks ago.    Breast Cancer Screening:    Health Maintenance   Topic Date Due    Mammogram  12/08/2024        Bone Density:      Osteoperosis Prevention:    Osteoperosis Prevention was discussed.  Reviewed Calcium, Vitamin D supplementation and Weight Bearing Exercises.    Patient is performing weight bearing exercises.  Patient is currently taking Vitamin D supplementation.        REVIEW OF SYSTEMS:   GENERAL: feels well otherwise  SKIN: denies any unusual skin lesions  EYES:denies blurred vision or double vision  HEENT: denies nasal congestion, sinus pain or ST  LUNGS: denies shortness of breath with  exertion  CARDIOVASCULAR: denies chest pain on exertion  GI: denies abdominal pain,denies heartburn  : denies dysuria, vaginal discharge or itching  MUSCULOSKELETAL: denies back pain  NEURO: denies headaches  PSYCH: no c/o      EXAM:   /80   Pulse 68   Temp 97.8 °F (36.6 °C)   Resp 14   Ht 5' 7\" (1.702 m)   Wt 179 lb (81.2 kg)   LMP 07/13/2024 (Approximate)   SpO2 98%   BMI 28.04 kg/m²   Body mass index is 28.04 kg/m².   GENERAL: well developed, well nourished,in no apparent distress  SKIN: no rashes,no suspicious lesions  HEENT: atraumatic, normocephalic,ears and throat are clear  EYES:PERRLA, EOMI, conjunctiva are clear  NECK: supple,no adenopathy,  CHEST: no chest tenderness  BREAST: no dominant or suspicious mass  LUNGS: clear to auscultation  CARDIO: RRR without murmur  GI: good BS's,no masses, HSM or tenderness  : Vagina normal and uterus normal. Normal cervix. Cervix exhibits no motion tenderness and no discharge. Bilateral adnexum display no mass, no tenderness and no fullness.  RECTAL: normal appearance  MUSCULOSKELETAL: back is not tender,  EXTREMITIES: no edema  NEURO: Oriented times three,cranial nerves are intact,motor and sensory are grossly intact    ASSESSMENT AND PLAN:      HEALTH MAINTENANCE:  as above regarding colonoscopy.  She is able to proceed.  She will call if continued concern from [facility].  Mammogram in December.      Encounter Diagnoses   Name Primary?    Routine general medical examination at a health care facility Yes    MTHFR (methylene THF reductase) deficiency and homocystinuria (HCC)     Dyslipidemia  stable      Hypothyroidism (acquired)  levothyroxine  per Dr Holman     Villarreal's palsy  stable      Migraine without aura and without status migrainosus, not intractable  imitrex PRN     Anxiety  sertraline  she may consider trying 25mg.      Screen for colon cancer     Encounter for screening mammogram for malignant neoplasm of breast     Screening for cervical cancer         Orders Placed This Encounter   Procedures    Hpv Dna  High Risk , Thin Prep Collect    ThinPrep PAP Smear       Meds & Refills for this Visit:  Requested Prescriptions     Signed Prescriptions Disp Refills    sertraline 50 MG Oral Tab 90 tablet 3     Sig: Take 1 tablet (50 mg total) by mouth daily.       Imaging & Consults:  EVALUATE & TREAT, GASTRO (INTERNAL)  EVERTON SARAH 2D+3D SCREENING BILAT (CPT=77067/68320)    No follow-ups on file.  There are no Patient Instructions on file for this visit.

## 2024-07-27 ENCOUNTER — OFFICE VISIT (OUTPATIENT)
Dept: INTERNAL MEDICINE CLINIC | Facility: CLINIC | Age: 49
End: 2024-07-27
Payer: COMMERCIAL

## 2024-07-27 VITALS
WEIGHT: 179 LBS | BODY MASS INDEX: 28.09 KG/M2 | HEIGHT: 67 IN | DIASTOLIC BLOOD PRESSURE: 80 MMHG | TEMPERATURE: 98 F | SYSTOLIC BLOOD PRESSURE: 128 MMHG | HEART RATE: 68 BPM | OXYGEN SATURATION: 98 % | RESPIRATION RATE: 14 BRPM

## 2024-07-27 DIAGNOSIS — E03.9 HYPOTHYROIDISM (ACQUIRED): ICD-10-CM

## 2024-07-27 DIAGNOSIS — Z12.4 SCREENING FOR CERVICAL CANCER: ICD-10-CM

## 2024-07-27 DIAGNOSIS — Z00.00 ROUTINE GENERAL MEDICAL EXAMINATION AT A HEALTH CARE FACILITY: Primary | ICD-10-CM

## 2024-07-27 DIAGNOSIS — E72.11 MTHFR (METHYLENE THF REDUCTASE) DEFICIENCY AND HOMOCYSTINURIA (HCC): ICD-10-CM

## 2024-07-27 DIAGNOSIS — E78.5 DYSLIPIDEMIA: ICD-10-CM

## 2024-07-27 DIAGNOSIS — Z12.31 ENCOUNTER FOR SCREENING MAMMOGRAM FOR MALIGNANT NEOPLASM OF BREAST: ICD-10-CM

## 2024-07-27 DIAGNOSIS — F41.9 ANXIETY: ICD-10-CM

## 2024-07-27 DIAGNOSIS — G43.009 MIGRAINE WITHOUT AURA AND WITHOUT STATUS MIGRAINOSUS, NOT INTRACTABLE: ICD-10-CM

## 2024-07-27 DIAGNOSIS — E72.12 MTHFR (METHYLENE THF REDUCTASE) DEFICIENCY AND HOMOCYSTINURIA (HCC): ICD-10-CM

## 2024-07-27 DIAGNOSIS — Z12.11 SCREEN FOR COLON CANCER: ICD-10-CM

## 2024-07-27 DIAGNOSIS — Z86.69 HISTORY OF BELL'S PALSY: ICD-10-CM

## 2024-07-27 PROCEDURE — 87624 HPV HI-RISK TYP POOLED RSLT: CPT | Performed by: NURSE PRACTITIONER

## 2024-07-27 PROCEDURE — 88175 CYTOPATH C/V AUTO FLUID REDO: CPT | Performed by: NURSE PRACTITIONER

## 2024-07-27 RX ORDER — TRETINOIN 1 MG/G
1 CREAM TOPICAL NIGHTLY
COMMUNITY
Start: 2023-08-31

## 2024-07-27 RX ORDER — SPIRONOLACTONE 25 MG/1
25 TABLET ORAL ONCE
COMMUNITY
Start: 2024-07-09

## 2024-07-29 LAB — HPV E6+E7 MRNA CVX QL NAA+PROBE: NEGATIVE

## 2024-07-31 LAB
.: NORMAL
.: NORMAL

## 2024-08-24 DIAGNOSIS — Z51.81 ENCOUNTER FOR THERAPEUTIC DRUG MONITORING: ICD-10-CM

## 2024-08-24 DIAGNOSIS — G43.009 MIGRAINE WITHOUT AURA AND WITHOUT STATUS MIGRAINOSUS, NOT INTRACTABLE: ICD-10-CM

## 2024-08-24 DIAGNOSIS — E78.5 DYSLIPIDEMIA: ICD-10-CM

## 2024-08-24 DIAGNOSIS — E03.9 HYPOTHYROIDISM (ACQUIRED): ICD-10-CM

## 2024-08-24 DIAGNOSIS — E66.3 OVERWEIGHT (BMI 25.0-29.9): ICD-10-CM

## 2024-08-26 RX ORDER — TOPIRAMATE 25 MG/1
25 TABLET, FILM COATED ORAL DAILY
Qty: 90 TABLET | Refills: 0 | Status: SHIPPED | OUTPATIENT
Start: 2024-08-26

## 2024-08-26 NOTE — TELEPHONE ENCOUNTER
Requesting   Requested Prescriptions     Pending Prescriptions Disp Refills    TOPIRAMATE 25 MG Oral Tab [Pharmacy Med Name: TOPIRAMATE 25 MG TABLET] 90 tablet 0     Sig: TAKE 1 TABLET (25 MG TOTAL) BY MOUTH DAILY.       LOV: 6/3/24  RTC:   Last Relevant Labs:   Filled: 6/3/24 # 30 with 2 refills    Future Appointments   Date Time Provider Department Center   9/3/2024 12:20 PM Valerie Cortez MD [facility]   12/3/2024 12:20 PM Valerie Cortez MD [facility]

## 2024-09-03 ENCOUNTER — OFFICE VISIT (OUTPATIENT)
Dept: INTERNAL MEDICINE CLINIC | Facility: CLINIC | Age: 49
End: 2024-09-03
Payer: COMMERCIAL

## 2024-09-03 VITALS
HEIGHT: 68 IN | HEART RATE: 76 BPM | WEIGHT: 180 LBS | DIASTOLIC BLOOD PRESSURE: 80 MMHG | SYSTOLIC BLOOD PRESSURE: 136 MMHG | RESPIRATION RATE: 16 BRPM | BODY MASS INDEX: 27.28 KG/M2

## 2024-09-03 DIAGNOSIS — E66.3 OVERWEIGHT (BMI 25.0-29.9): ICD-10-CM

## 2024-09-03 DIAGNOSIS — E78.5 DYSLIPIDEMIA: ICD-10-CM

## 2024-09-03 DIAGNOSIS — E03.9 HYPOTHYROIDISM (ACQUIRED): ICD-10-CM

## 2024-09-03 DIAGNOSIS — G43.009 MIGRAINE WITHOUT AURA AND WITHOUT STATUS MIGRAINOSUS, NOT INTRACTABLE: ICD-10-CM

## 2024-09-03 DIAGNOSIS — Z51.81 ENCOUNTER FOR THERAPEUTIC DRUG MONITORING: Primary | ICD-10-CM

## 2024-09-03 PROCEDURE — 3075F SYST BP GE 130 - 139MM HG: CPT | Performed by: INTERNAL MEDICINE

## 2024-09-03 PROCEDURE — 3008F BODY MASS INDEX DOCD: CPT | Performed by: INTERNAL MEDICINE

## 2024-09-03 PROCEDURE — 3079F DIAST BP 80-89 MM HG: CPT | Performed by: INTERNAL MEDICINE

## 2024-09-03 PROCEDURE — 99214 OFFICE O/P EST MOD 30 MIN: CPT | Performed by: INTERNAL MEDICINE

## 2024-09-03 RX ORDER — PHENTERMINE HYDROCHLORIDE 37.5 MG/1
37.5 TABLET ORAL
Qty: 30 TABLET | Refills: 2 | Status: SHIPPED | OUTPATIENT
Start: 2024-09-03

## 2024-09-03 RX ORDER — TOPIRAMATE 50 MG/1
50 TABLET, FILM COATED ORAL 2 TIMES DAILY
Qty: 30 TABLET | Refills: 2 | Status: SHIPPED | OUTPATIENT
Start: 2024-09-03

## 2024-09-03 NOTE — PROGRESS NOTES
HISTORY OF PRESENT ILLNESS  Chief Complaint   Patient presents with    Weight Check     Down 9 lb       Arely Harris is a 49 year old female here for follow up in medical weight loss program.     Denies chest pain, shortness of breath, dizziness, blurred vision, headache, paresthesia, nausea/vomiting.   Reviewed 24 dietary recalll     Reviewed 24 dietary recall likes to walk   Doing strength training and body weights and youtube videos when she can to add in weight training     Strugglig with night eating   Cravings / hunger at night       [facility] Follow Up    General Information  Success Moment: Making better food choices. Being more active  Challenging Moment: Late night eating  Nutrition Recall  Breakfast: coffee, 2 eggs Lunch: 2 pc bread and chicken breast with   lettuce and mustard   Dinner: 2 slices pizza Snacks: none   Fluids: water, coke zero Dining Out: 2   Exercise   Patient stated exercises # days/week: 4  Patient stated perceived level of   exertion: 3 Anaerobic Days: 2   Aerobic Days: 2   Patient stated average level of stress: 7  Sleep   Patient stated # hours uninterrupted sleep: 6   Patient stated feels   restful: Yes      Goals: lose weight                  Wt Readings from Last 6 Encounters:   09/03/24 180 lb (81.6 kg)   07/27/24 179 lb (81.2 kg)   02/27/24 189 lb (85.7 kg)   01/04/24 175 lb (79.4 kg)   11/09/23 170 lb (77.1 kg)   10/17/23 185 lb (83.9 kg)            Breakfast Lunch Dinner Snacks Fluids   reviewed           REVIEW OF SYSTEMS  GENERAL HEALTH: feels well otherwise, denied any fevers chills or night sweats   RESPIRATORY: denies shortness of breath   CARDIOVASCULAR: denies chest pain  GI: denies abdominal pain    EXAM  /80   Pulse 76   Resp 16   Ht 5' 8\" (1.727 m)   Wt 180 lb (81.6 kg)   LMP 08/20/2024 (Approximate)   BMI 27.37 kg/m²   GENERAL: well developed, well nourished,in no apparent distress, A/O x3  SKIN: no rashes,no suspicious lesions  HEENT: atraumatic,  normocephalic, OP-clear, PERRL  NECK: supple,no adenopathy  LUNGS: clear to auscultation bilaterally   CARDIO: RRR without murmur  GI: good BS's,NT/ND, no masses or HSM  EXTREMITIES: no cyanosis, no clubbing, no edema      Lab Results   Component Value Date    WBC 6.0 05/27/2023    RBC 4.79 05/27/2023    HGB 15.0 05/27/2023    HCT 44.2 05/27/2023    MCV 92.3 05/27/2023    MCH 31.3 05/27/2023    MCHC 33.9 05/27/2023    RDW 12.1 05/27/2023    .0 05/27/2023    MPV 10.5 (H) 10/22/2011     Lab Results   Component Value Date    GLU 94 05/27/2023    BUN 16 05/27/2023    BUNCREA 16.3 03/27/2021    CREATSERUM 0.88 05/27/2023    ANIONGAP 3 05/27/2023    GFR 85 12/12/2017    GFRNAA 82 03/27/2021    GFRAA 95 03/27/2021    CA 9.3 05/27/2023    OSMOCALC 287 05/27/2023    ALKPHO 99 05/27/2023    AST 24 05/27/2023    ALT 28 05/27/2023    BILT 0.6 05/27/2023    TP 7.3 05/27/2023    ALB 3.8 05/27/2023    GLOBULIN 3.5 05/27/2023    AGRATIO 1.5 01/12/2016     05/27/2023    K 4.7 05/27/2023     05/27/2023    CO2 27.0 05/27/2023     Lab Results   Component Value Date     02/24/2023    A1C 5.4 02/24/2023     Lab Results   Component Value Date    CHOLEST 213 (H) 05/27/2023    TRIG 101 05/27/2023    HDL 52 05/27/2023     (H) 05/27/2023    VLDL 19 05/27/2023    TCHDLRATIO 2.64 04/10/2018    NONHDLC 161 (H) 05/27/2023     Lab Results   Component Value Date    T4F 1.5 05/17/2024    TSH 0.340 (L) 05/17/2024    TSHT4 0.58 12/15/2015     Lab Results   Component Value Date    B12 586 03/27/2021     Lab Results   Component Value Date    VITD 26.3 (L) 03/27/2021       Current Outpatient Medications on File Prior to Visit   Medication Sig Dispense Refill    Tretinoin 0.1 % External Cream Apply 1 Application topically nightly.      spironolactone 25 MG Oral Tab Take 1 tablet (25 mg total) by mouth one time. One daily with 50 mg to equal 75 mg      sertraline 50 MG Oral Tab Take 1 tablet (50 mg total) by mouth daily. 90  tablet 3    levothyroxine 175 MCG Oral Tab Take 1 tablet (175 mcg total) by mouth every morning before breakfast. 90 tablet 0    SUMAtriptan Succinate 100 MG Oral Tab TAKE 1 TABLET (100 MG TOTAL) BY MOUTH AS NEEDED. MAY REPEAT IN 2 HOURS MAX 2 IN 24 HOURS 18 tablet 0    Azelaic Acid 15 % External Gel Apply 1 Application topically every morning.      ibuprofen (MOTRIN) 400 MG Oral Tab Take 1 tablet (400 mg total) by mouth every 6 (six) hours as needed for Pain.      Multiple Vitamins-Minerals (MULTIVITAMIN & MINERAL OR) Take  by mouth daily.      spironolactone 50 MG Oral Tab TAKE 1 TABLET BY MOUTH TWICE DAILY (Patient taking differently: Take 1 tablet (50 mg total) by mouth daily. One tab daily of 50 mg with 25 mg to equal 75 g) 180 tablet 1     No current facility-administered medications on file prior to visit.       ASSESSMENT  Analyzed weight data:       Diagnoses and all orders for this visit:    Encounter for therapeutic drug monitoring  -     Phentermine HCl 37.5 MG Oral Tab; Take 1 tablet (37.5 mg total) by mouth every morning before breakfast.    Overweight (BMI 25.0-29.9)  -     Phentermine HCl 37.5 MG Oral Tab; Take 1 tablet (37.5 mg total) by mouth every morning before breakfast.    Hypothyroidism (acquired)  -     Phentermine HCl 37.5 MG Oral Tab; Take 1 tablet (37.5 mg total) by mouth every morning before breakfast.    Dyslipidemia  -     Phentermine HCl 37.5 MG Oral Tab; Take 1 tablet (37.5 mg total) by mouth every morning before breakfast.    Migraine without aura and without status migrainosus, not intractable  -     Phentermine HCl 37.5 MG Oral Tab; Take 1 tablet (37.5 mg total) by mouth every morning before breakfast.    Other orders  -     topiramate (TOPAMAX) 50 MG Oral Tab; Take 1 tablet (50 mg total) by mouth 2 (two) times daily.              PLAN  Reconsult at 195 lb on 8/24/23   Continue phentermine increase daily   Reviewed 24 dietary recall   Reviewed stress test  Increase strength  training   -advised of side effects and adverse effects of this medication  Increase topamax to 50 mg bid   -advised of side effects and adverse effects of this medication  History of migraines   No history of renal stones and had tubal ligation.  HLD- stable controlled , continue lifestyle modifications and weight loss.  Hypothyroidism- stable on treatment  Nutrition: low carb diet/ recommended to eat breakfast daily/ regular protein intake  Medication use and side effects reviewed with patient.  Medication contraindications: n/a   Follow up with dietitian and psychologist as recommended.  Discussed the role of sleep and stress in weight management.  Counseled on comprehensive weight loss plan including attention to nutrition, exercise and behavior/stress management for success. See patient instruction below for more details.  Discussed strategies to overcome barriers to successful weight loss and weight maintenance  FITTE: ACSM recommendations (150-300 minutes/ week in active weight loss)   Weight Loss consent to treat reviewed and signed n/a    There are no Patient Instructions on file for this visit.    No follow-ups on file.    Patient verbalizes understanding.    Valerie Cortez MD      Answers submitted by the patient for this visit:  Medical Weight Loss Follow Up (Submitted on 2/26/2024)  If greater than 5/1O how would you grade your coping mechanisms?: moderate

## 2024-10-06 DIAGNOSIS — E03.9 HYPOTHYROIDISM (ACQUIRED): ICD-10-CM

## 2024-10-07 RX ORDER — LEVOTHYROXINE SODIUM 175 UG/1
175 TABLET ORAL
Qty: 90 TABLET | Refills: 0 | Status: SHIPPED | OUTPATIENT
Start: 2024-10-07

## 2024-10-07 NOTE — TELEPHONE ENCOUNTER
Please review; protocol failed    No future appointments with family medicine/internal medicine.  Last office visit: 7/27/2024    Requested Prescriptions   Pending Prescriptions Disp Refills    LEVOTHYROXINE 175 MCG Oral Tab [Pharmacy Med Name: LEVOTHYROXINE 175 MCG TABLET] 90 tablet 0     Sig: TAKE 1 TABLET BY MOUTH EVERY MORNING BEFORE BREAKFAST.       Thyroid Medication Protocol Failed - 10/6/2024  4:47 AM        Failed - Last TSH value is normal     Lab Results   Component Value Date    TSH 0.340 (L) 05/17/2024    TSHT4 0.58 12/15/2015                 Passed - TSH in past 12 months        Passed - In person appointment or virtual visit in the past 12 mos or appointment in next 3 mos     Recent Outpatient Visits              1 month ago Encounter for therapeutic drug monitoring    [address] Valerie Cortez MD    Office Visit    2 months ago Routine general medical examination at a health care facility    [address] Brigitte Barriga APRN    Office Visit    4 months ago Encounter for therapeutic drug monitoring    [address] Valerie Cortez MD    Telemedicine    7 months ago Encounter for therapeutic drug monitoring    [address] Valerie Cortez MD    Office Visit    11 months ago Therapeutic drug monitoring    [facility], Virtual Visit Valerie Cortez MD    Telemedicine          Future Appointments         Provider Department Appt Notes    In 1 month Valerie Cortez MD [address]     In 5 months Valerie Cortez MD [address]     In 8 months Valerie Cortez MD [address]                          Future Appointments         Provider Department Appt Notes    In 1 month Valerie Cortez MD [address]  [facility]     In 5 months Valerie Cortez MD [facility], [address] [place] [address]     In 8 months Valerie Cortez MD [address]           Recent Outpatient Visits              1 month ago Encounter for therapeutic drug monitoring    [address] Valerie Cortez MD    Office Visit    2 months ago Routine general medical examination at a health care facility    [address] Brigitte Barriga APRN    Office Visit    4 months ago Encounter for therapeutic drug monitoring    [address] Valerie Cortez MD    Telemedicine    7 months ago Encounter for therapeutic drug monitoring    [address] Valerie Cortez MD    Office Visit    11 months ago Therapeutic drug monitoring    [facility], Virtual Visit Valerie Cortez MD    Telemedicine

## 2024-10-20 DIAGNOSIS — F41.9 ANXIETY: ICD-10-CM

## 2024-10-22 RX ORDER — SUMATRIPTAN 100 MG/1
TABLET, FILM COATED ORAL
Qty: 18 TABLET | Refills: 3 | Status: SHIPPED | OUTPATIENT
Start: 2024-10-22

## 2024-10-22 NOTE — TELEPHONE ENCOUNTER
Refill passed per [facility] protocols.    Requested Prescriptions   Pending Prescriptions Disp Refills    SUMATRIPTAN SUCCINATE 100 MG Oral Tab [Pharmacy Med Name: SUMATRIPTAN SUCC 100 MG TABLET] 18 tablet 3     Sig: TAKE 1 TABLET (100 MG TOTAL) BY MOUTH AS NEEDED. MAY REPEAT AFTER 2 HOURS IF NEEDED. MAX 2 IN 24 HOURS       Neurology Medications Passed - 10/22/2024  4:17 PM        Passed - In person appointment or virtual visit in the past 6 mos or appointment in next 3 mos     Recent Outpatient Visits              1 month ago Encounter for therapeutic drug monitoring    [address] Valerie Cortez MD    Office Visit    2 months ago Routine general medical examination at a health care facility    [address] Brigitte Barriga APRN    Office Visit    4 months ago Encounter for therapeutic drug monitoring    [address] Valerie Cortez MD    Telemedicine    7 months ago Encounter for therapeutic drug monitoring    [address] Valerie Cortez MD    Office Visit    12 months ago Therapeutic drug monitoring    [facility], Virtual Visit Valerie Cortez MD    Telemedicine          Future Appointments         Provider Department Appt Notes    In 1 month Valerie Cortez MD [address]     In 4 months Valerie Cortez MD [address]     In 7 months Valerie Cortez MD [address]

## 2024-11-23 DIAGNOSIS — E66.3 OVERWEIGHT (BMI 25.0-29.9): ICD-10-CM

## 2024-11-23 DIAGNOSIS — E78.5 DYSLIPIDEMIA: ICD-10-CM

## 2024-11-23 DIAGNOSIS — Z51.81 ENCOUNTER FOR THERAPEUTIC DRUG MONITORING: ICD-10-CM

## 2024-11-23 DIAGNOSIS — E03.9 HYPOTHYROIDISM (ACQUIRED): ICD-10-CM

## 2024-11-23 DIAGNOSIS — G43.009 MIGRAINE WITHOUT AURA AND WITHOUT STATUS MIGRAINOSUS, NOT INTRACTABLE: ICD-10-CM

## 2024-11-25 RX ORDER — TOPIRAMATE 25 MG/1
25 TABLET, FILM COATED ORAL DAILY
Qty: 90 TABLET | Refills: 0 | OUTPATIENT
Start: 2024-11-25

## 2024-12-03 ENCOUNTER — OFFICE VISIT (OUTPATIENT)
Dept: INTERNAL MEDICINE CLINIC | Facility: CLINIC | Age: 49
End: 2024-12-03
Payer: COMMERCIAL

## 2024-12-03 VITALS
DIASTOLIC BLOOD PRESSURE: 84 MMHG | SYSTOLIC BLOOD PRESSURE: 126 MMHG | HEIGHT: 67 IN | HEART RATE: 89 BPM | WEIGHT: 177 LBS | RESPIRATION RATE: 18 BRPM | BODY MASS INDEX: 27.78 KG/M2

## 2024-12-03 DIAGNOSIS — E66.3 OVERWEIGHT (BMI 25.0-29.9): ICD-10-CM

## 2024-12-03 DIAGNOSIS — E78.5 DYSLIPIDEMIA: ICD-10-CM

## 2024-12-03 DIAGNOSIS — E03.9 HYPOTHYROIDISM (ACQUIRED): ICD-10-CM

## 2024-12-03 DIAGNOSIS — Z51.81 ENCOUNTER FOR THERAPEUTIC DRUG MONITORING: Primary | ICD-10-CM

## 2024-12-03 PROCEDURE — 3074F SYST BP LT 130 MM HG: CPT | Performed by: INTERNAL MEDICINE

## 2024-12-03 PROCEDURE — 99214 OFFICE O/P EST MOD 30 MIN: CPT | Performed by: INTERNAL MEDICINE

## 2024-12-03 PROCEDURE — 3008F BODY MASS INDEX DOCD: CPT | Performed by: INTERNAL MEDICINE

## 2024-12-03 PROCEDURE — 3079F DIAST BP 80-89 MM HG: CPT | Performed by: INTERNAL MEDICINE

## 2024-12-03 NOTE — PROGRESS NOTES
HISTORY OF PRESENT ILLNESS  Chief Complaint   Patient presents with    Weight Check     Down 3        Arely Harris is a 49 year old female here for follow up in medical weight loss program.     Denies chest pain, shortness of breath, dizziness, blurred vision, headache, paresthesia, nausea/vomiting.   Reviewed 24 dietary recalll   Currently on phentermine half in the morning and half topamax     Forgetting afternoon dose       [facility] Follow Up    General Information  Success Moment: Being more active  Challenging Moment: Staying away from sweets  Nutrition Recall  Breakfast: Toast Lunch: Salad with grilled chicken   Dinner: Steak and veggies Snacks: Cookie   Fluids: Water coffee    Exercise   Patient stated exercises # days/week: 4  Patient stated perceived level of   exertion: 3 Anaerobic Days: 1   Aerobic Days: 3   Patient stated average level of stress: 5  Sleep   Patient stated # hours uninterrupted sleep: 6   Patient stated feels   restful: No      Cause of disruption of sleep: A lot to do   Goals: Lose weight                  Wt Readings from Last 6 Encounters:   12/03/24 177 lb (80.3 kg)   09/03/24 180 lb (81.6 kg)   07/27/24 179 lb (81.2 kg)   02/27/24 189 lb (85.7 kg)   01/04/24 175 lb (79.4 kg)   11/09/23 170 lb (77.1 kg)            Breakfast Lunch Dinner Snacks Fluids   reviewed           REVIEW OF SYSTEMS  GENERAL HEALTH: feels well otherwise, denied any fevers chills or night sweats   RESPIRATORY: denies shortness of breath   CARDIOVASCULAR: denies chest pain  GI: denies abdominal pain    EXAM  /84   Pulse 89   Resp 18   Ht 5' 7\" (1.702 m)   Wt 177 lb (80.3 kg)   LMP 11/18/2024 (Approximate)   BMI 27.72 kg/m²   GENERAL: well developed, well nourished,in no apparent distress, A/O x3  SKIN: no rashes,no suspicious lesions  HEENT: atraumatic, normocephalic, OP-clear, PERRL  NECK: supple,no adenopathy  LUNGS: clear to auscultation bilaterally   CARDIO: RRR without murmur  GI: good BS's,NT/ND,  no masses or HSM  EXTREMITIES: no cyanosis, no clubbing, no edema      Lab Results   Component Value Date    WBC 6.0 05/27/2023    RBC 4.79 05/27/2023    HGB 15.0 05/27/2023    HCT 44.2 05/27/2023    MCV 92.3 05/27/2023    MCH 31.3 05/27/2023    MCHC 33.9 05/27/2023    RDW 12.1 05/27/2023    .0 05/27/2023    MPV 10.5 (H) 10/22/2011     Lab Results   Component Value Date    GLU 94 05/27/2023    BUN 16 05/27/2023    BUNCREA 16.3 03/27/2021    CREATSERUM 0.88 05/27/2023    ANIONGAP 3 05/27/2023    GFR 85 12/12/2017    GFRNAA 82 03/27/2021    GFRAA 95 03/27/2021    CA 9.3 05/27/2023    OSMOCALC 287 05/27/2023    ALKPHO 99 05/27/2023    AST 24 05/27/2023    ALT 28 05/27/2023    BILT 0.6 05/27/2023    TP 7.3 05/27/2023    ALB 3.8 05/27/2023    GLOBULIN 3.5 05/27/2023    AGRATIO 1.5 01/12/2016     05/27/2023    K 4.7 05/27/2023     05/27/2023    CO2 27.0 05/27/2023     Lab Results   Component Value Date     02/24/2023    A1C 5.4 02/24/2023     Lab Results   Component Value Date    CHOLEST 213 (H) 05/27/2023    TRIG 101 05/27/2023    HDL 52 05/27/2023     (H) 05/27/2023    VLDL 19 05/27/2023    TCHDLRATIO 2.64 04/10/2018    NONHDLC 161 (H) 05/27/2023     Lab Results   Component Value Date    T4F 1.5 05/17/2024    TSH 0.340 (L) 05/17/2024    TSHT4 0.58 12/15/2015     Lab Results   Component Value Date    B12 586 03/27/2021     Lab Results   Component Value Date    VITD 26.3 (L) 03/27/2021       Current Outpatient Medications on File Prior to Visit   Medication Sig Dispense Refill    SUMAtriptan Succinate 100 MG Oral Tab TAKE 1 TABLET (100 MG TOTAL) BY MOUTH AS NEEDED. MAY REPEAT AFTER 2 HOURS IF NEEDED. MAX 2 TABLETS IN 24 HOURS 18 tablet 3    levothyroxine 175 MCG Oral Tab Take 1 tablet (175 mcg total) by mouth before breakfast. 90 tablet 0    Tretinoin 0.1 % External Cream Apply 1 Application topically nightly.      spironolactone 25 MG Oral Tab Take 1 tablet (25 mg total) by mouth one  time. One daily with 50 mg to equal 75 mg      sertraline 50 MG Oral Tab Take 1 tablet (50 mg total) by mouth daily. 90 tablet 3    Azelaic Acid 15 % External Gel Apply 1 Application topically every morning.      ibuprofen (MOTRIN) 400 MG Oral Tab Take 1 tablet (400 mg total) by mouth every 6 (six) hours as needed for Pain.      Multiple Vitamins-Minerals (MULTIVITAMIN & MINERAL OR) Take  by mouth daily.       No current facility-administered medications on file prior to visit.       ASSESSMENT  Analyzed weight data:       Diagnoses and all orders for this visit:    Encounter for therapeutic drug monitoring    Overweight (BMI 25.0-29.9)    Dyslipidemia    Hypothyroidism (acquired)    Other orders  -     semaglutide-weight management 0.25 MG/0.5ML Subcutaneous Solution Auto-injector; Inject 0.5 mL (0.25 mg total) into the skin once a week for 4 doses.  -     Discontinue: semaglutide-weight management 0.5 MG/0.5ML Subcutaneous Solution Auto-injector; Inject 0.5 mL (0.5 mg total) into the skin once a week for 8 doses.  -     semaglutide-weight management 0.5 MG/0.5ML Subcutaneous Solution Auto-injector; Inject 0.5 mL (0.5 mg total) into the skin once a week for 8 doses.              PLAN  Reconsult at 195 lb on 8/24/23   Tried and failed and phentermine   Total time spent on chart review, pre-charting, obtaining history, counseling, and educating, reviewing labs was 30 minutes.    Trial of wegovy   -advised of side effects and adverse effects of this medication  Injection teaching in OV     No history of renal stones and had tubal ligation.  HLD- stable controlled , continue lifestyle modifications and weight loss.  Hypothyroidism- stable on treatment  Nutrition: low carb diet/ recommended to eat breakfast daily/ regular protein intake  Medication use and side effects reviewed with patient.  Medication contraindications: n/a   Follow up with dietitian and psychologist as recommended.  Discussed the role of sleep and  stress in weight management.  Counseled on comprehensive weight loss plan including attention to nutrition, exercise and behavior/stress management for success. See patient instruction below for more details.  Discussed strategies to overcome barriers to successful weight loss and weight maintenance  FITTE: ACSM recommendations (150-300 minutes/ week in active weight loss)   Weight Loss consent to treat reviewed and signed n/a    There are no Patient Instructions on file for this visit.    No follow-ups on file.    Patient verbalizes understanding.    Valerie Cortez MD      Answers submitted by the patient for this visit:  Medical Weight Loss Follow Up (Submitted on 2/26/2024)  If greater than 5/1O how would you grade your coping mechanisms?: moderate

## 2024-12-27 ENCOUNTER — HOSPITAL ENCOUNTER (OUTPATIENT)
Dept: MAMMOGRAPHY | Age: 49
Discharge: HOME | End: 2024-12-27
Attending: NURSE PRACTITIONER
Payer: COMMERCIAL

## 2024-12-27 DIAGNOSIS — Z12.31 ENCOUNTER FOR SCREENING MAMMOGRAM FOR MALIGNANT NEOPLASM OF BREAST: ICD-10-CM

## 2024-12-27 PROCEDURE — 77063 BREAST TOMOSYNTHESIS BI: CPT | Performed by: NURSE PRACTITIONER

## 2024-12-27 PROCEDURE — 77067 SCR MAMMO BI INCL CAD: CPT | Performed by: NURSE PRACTITIONER

## 2025-01-03 DIAGNOSIS — E03.9 HYPOTHYROIDISM (ACQUIRED): ICD-10-CM

## 2025-01-08 RX ORDER — LEVOTHYROXINE SODIUM 175 UG/1
175 TABLET ORAL
Qty: 90 TABLET | Refills: 3 | OUTPATIENT
Start: 2025-01-08

## 2025-01-08 NOTE — TELEPHONE ENCOUNTER
Patient called back, states that her endocrinologist fills this medication, she is unsure why the refill came to our office. Patient is reaching out to endocrinologist for refill

## 2025-01-08 NOTE — TELEPHONE ENCOUNTER
Please Review. Protocol Failed; No Protocol   Lab Results   Component Value Date     TSH 0.340 (L) 05/17/2024     TSHT4 0.58 12/15/2015     Requested Prescriptions   Pending Prescriptions Disp Refills    LEVOTHYROXINE 175 MCG Oral Tab [Pharmacy Med Name: LEVOTHYROXINE 175 MCG TABLET] 90 tablet 0     Sig: Take 1 tablet (175 mcg total) by mouth before breakfast.       Thyroid Medication Protocol Failed - 1/8/2025 11:09 AM        Failed - Last TSH value is normal     Lab Results   Component Value Date    TSH 0.340 (L) 05/17/2024    TSHT4 0.58 12/15/2015                 Passed - TSH in past 12 months        Passed - In person appointment or virtual visit in the past 12 mos or appointment in next 3 mos     Recent Outpatient Visits              1 month ago Encounter for therapeutic drug monitoring    [address] Valerie Cortez MD    Office Visit    4 months ago Encounter for therapeutic drug monitoring    [address] Valerie Cortez MD    Office Visit    5 months ago Routine general medical examination at a health care facility    [address] Brigitte Barriga APRN    Office Visit    7 months ago Encounter for therapeutic drug monitoring    [address]    Telemedicine    10 months ago Encounter for therapeutic drug monitoring    [address] Valerie Cortez MD    Office Visit          Future Appointments         Provider Department Appt Notes    In 1 month Ricardo Minaya MD [facility] 1/2/25 screening colon with Dr. Minaya on 2/24/25 @ 7a. david    In 2 months Valerie Cortez MD [address]     In 5 months Valerie Cortez MD [address]     In 8 months Valerie Cortez MD [address]  [place] 3 mo f/u    In 11 months Valerie Cortez MD [address] 3 m o f/u                    Passed - Medication is active on med list               Future Appointments         Provider Department Appt Notes    In 1 month Ricardo Minaya MD [facility] 1/2/25 screening colon with Dr. Minaya on 2/24/25 @ 7am. [email or web address]    In 2 months Valerie Cortez MD [address]     In 5 months Valerie Cortez MD [address]     In 8 months Valerie Cortez MD [address] 3 mo f/u    In 11 months Valerie Cortez MD [address] 3 m o f/u          Recent Outpatient Visits              1 month ago Encounter for therapeutic drug monitoring    [address] Valerie Cortez MD    Office Visit    4 months ago Encounter for therapeutic drug monitoring    [address] Valerie Cortez MD    Office Visit    5 months ago Routine general medical examination at a health care facility    [address] Brigitte Barriga APRN    Office Visit    7 months ago Encounter for therapeutic drug monitoring    [address]    Telemedicine    10 months ago Encounter for therapeutic drug monitoring    [address] Valerie Cortez MD    Office Visit

## 2025-01-08 NOTE — TELEPHONE ENCOUNTER
Attempted to call patient. Left detailed message advising that Endo historically refilled her Levothyroxine. Please call office to discuss.

## 2025-02-19 ENCOUNTER — HOSPITAL ENCOUNTER (EMERGENCY)
Age: 50
Discharge: HOME | End: 2025-02-19
Attending: EMERGENCY MEDICINE
Payer: COMMERCIAL

## 2025-02-19 VITALS
WEIGHT: 165 LBS | SYSTOLIC BLOOD PRESSURE: 110 MMHG | BODY MASS INDEX: 26 KG/M2 | OXYGEN SATURATION: 96 % | DIASTOLIC BLOOD PRESSURE: 74 MMHG | RESPIRATION RATE: 16 BRPM | TEMPERATURE: 99 F | HEART RATE: 81 BPM

## 2025-02-19 DIAGNOSIS — E86.0 DEHYDRATION: ICD-10-CM

## 2025-02-19 DIAGNOSIS — K52.9 GASTROENTERITIS: Primary | ICD-10-CM

## 2025-02-19 LAB
ALBUMIN SERPL-MCNC: 4.6 G/DL (ref 3.2–4.8)
ALBUMIN/GLOB SERPL: 1.5 {RATIO} (ref 1–2)
ALP LIVER SERPL-CCNC: 76 U/L
ALT SERPL-CCNC: 26 U/L
ANION GAP SERPL CALC-SCNC: 7 MMOL/L (ref 0–18)
AST SERPL-CCNC: 25 U/L (ref ?–34)
B-HCG UR QL: NEGATIVE
BASOPHILS # BLD AUTO: 0.04 X10(3) UL (ref 0–0.2)
BASOPHILS NFR BLD AUTO: 0.3 %
BILIRUB SERPL-MCNC: 1.5 MG/DL (ref 0.3–1.2)
BILIRUB UR QL STRIP.AUTO: NEGATIVE
BUN BLD-MCNC: 18 MG/DL (ref 9–23)
CALCIUM BLD-MCNC: 9.4 MG/DL (ref 8.7–10.6)
CHLORIDE SERPL-SCNC: 101 MMOL/L (ref 98–112)
CLARITY UR REFRACT.AUTO: CLEAR
CO2 SERPL-SCNC: 26 MMOL/L (ref 21–32)
COLOR UR AUTO: YELLOW
CREAT BLD-MCNC: 0.95 MG/DL
EGFRCR SERPLBLD CKD-EPI 2021: 74 ML/MIN/1.73M2 (ref 60–?)
EOSINOPHIL # BLD AUTO: 0.06 X10(3) UL (ref 0–0.7)
EOSINOPHIL NFR BLD AUTO: 0.5 %
ERYTHROCYTE [DISTWIDTH] IN BLOOD BY AUTOMATED COUNT: 12.5 %
GLOBULIN PLAS-MCNC: 3.1 G/DL (ref 2–3.5)
GLUCOSE BLD-MCNC: 162 MG/DL (ref 70–99)
GLUCOSE UR STRIP.AUTO-MCNC: NEGATIVE MG/DL
HCT VFR BLD AUTO: 47.5 %
HGB BLD-MCNC: 16.8 G/DL
IMM GRANULOCYTES # BLD AUTO: 0.06 X10(3) UL (ref 0–1)
IMM GRANULOCYTES NFR BLD: 0.5 %
KETONES UR STRIP.AUTO-MCNC: 15 MG/DL
LEUKOCYTE ESTERASE UR QL STRIP.AUTO: NEGATIVE
LIPASE SERPL-CCNC: 27 U/L (ref 12–53)
LYMPHOCYTES # BLD AUTO: 0.43 X10(3) UL (ref 1–4)
LYMPHOCYTES NFR BLD AUTO: 3.6 %
MCH RBC QN AUTO: 32.6 PG (ref 26–34)
MCHC RBC AUTO-ENTMCNC: 35.4 G/DL (ref 31–37)
MCV RBC AUTO: 92.1 FL
MONOCYTES # BLD AUTO: 0.56 X10(3) UL (ref 0.1–1)
MONOCYTES NFR BLD AUTO: 4.6 %
NEUTROPHILS # BLD AUTO: 10.9 X10 (3) UL (ref 1.5–7.7)
NEUTROPHILS # BLD AUTO: 10.9 X10(3) UL (ref 1.5–7.7)
NEUTROPHILS NFR BLD AUTO: 90.5 %
NITRITE UR QL STRIP.AUTO: NEGATIVE
OSMOLALITY SERPL CALC.SUM OF ELEC: 283 MOSM/KG (ref 275–295)
PH UR STRIP.AUTO: 8.5 [PH] (ref 5–8)
PLATELET # BLD AUTO: 287 10(3)UL (ref 150–450)
POTASSIUM SERPL-SCNC: 4.1 MMOL/L (ref 3.5–5.1)
PROT SERPL-MCNC: 7.7 G/DL (ref 5.7–8.2)
RBC # BLD AUTO: 5.16 X10(6)UL
SODIUM SERPL-SCNC: 134 MMOL/L (ref 136–145)
SP GR UR STRIP.AUTO: 1.02 (ref 1–1.03)
UROBILINOGEN UR STRIP.AUTO-MCNC: 0.2 MG/DL
WBC # BLD AUTO: 12.1 X10(3) UL (ref 4–11)

## 2025-02-19 PROCEDURE — 81025 URINE PREGNANCY TEST: CPT

## 2025-02-19 PROCEDURE — 96361 HYDRATE IV INFUSION ADD-ON: CPT

## 2025-02-19 PROCEDURE — 83690 ASSAY OF LIPASE: CPT | Performed by: EMERGENCY MEDICINE

## 2025-02-19 PROCEDURE — 81015 MICROSCOPIC EXAM OF URINE: CPT | Performed by: EMERGENCY MEDICINE

## 2025-02-19 PROCEDURE — 96375 TX/PRO/DX INJ NEW DRUG ADDON: CPT

## 2025-02-19 PROCEDURE — 99284 EMERGENCY DEPT VISIT MOD MDM: CPT

## 2025-02-19 PROCEDURE — 80053 COMPREHEN METABOLIC PANEL: CPT | Performed by: EMERGENCY MEDICINE

## 2025-02-19 PROCEDURE — 85025 COMPLETE CBC W/AUTO DIFF WBC: CPT | Performed by: EMERGENCY MEDICINE

## 2025-02-19 PROCEDURE — 96374 THER/PROPH/DIAG INJ IV PUSH: CPT

## 2025-02-19 PROCEDURE — 81001 URINALYSIS AUTO W/SCOPE: CPT | Performed by: EMERGENCY MEDICINE

## 2025-02-19 RX ORDER — DICYCLOMINE HCL 20 MG
20 TABLET ORAL 4 TIMES DAILY PRN
Qty: 30 TABLET | Refills: 0 | Status: SHIPPED | OUTPATIENT
Start: 2025-02-19 | End: 2025-03-21

## 2025-02-19 RX ORDER — KETOROLAC TROMETHAMINE 15 MG/ML
15 INJECTION, SOLUTION INTRAMUSCULAR; INTRAVENOUS ONCE
Status: COMPLETED | OUTPATIENT
Start: 2025-02-19 | End: 2025-02-19

## 2025-02-19 RX ORDER — ONDANSETRON 4 MG/1
4 TABLET, ORALLY DISINTEGRATING ORAL EVERY 4 HOURS PRN
Qty: 10 TABLET | Refills: 0 | Status: SHIPPED | OUTPATIENT
Start: 2025-02-19 | End: 2025-02-26

## 2025-02-19 RX ORDER — ONDANSETRON 2 MG/ML
4 INJECTION INTRAMUSCULAR; INTRAVENOUS ONCE
Status: COMPLETED | OUTPATIENT
Start: 2025-02-19 | End: 2025-02-19

## 2025-02-19 NOTE — ED PROVIDER NOTES
Patient Seen in: [facility] In [place]      History     Chief Complaint   Patient presents with    Nausea/Vomiting/Diarrhea     Stated Complaint: n/v    Subjective:   HPI      49-year-old female comes to the hospital [facility] having difficulty with nausea and vomiting since 8 PM.  She feels she has had at least 30 episodes of vomiting.  She is having sharp epigastric pains with this as well.  She denies any diarrhea.  She is no headaches or dizziness pressures no chest pain or troubles breathing.  She has any fevers, chills or bodyaches.  She has no runny nose, sneeze or cough.  She has no dysuria.  Patient's surgical history is consistent with C-sections without other surgeries in the past.  She denies any travel history.  She is denying any other specific complaints at this time.    Objective:     Past Medical History:    Acne    Anxiety state, unspecified    Back pain    L4-5 and L5-S1 lami in     Decorative tattoo    Flatulence/gas pain/belching    Headache disorder    High cholesterol    Hypothyroidism (acquired)    Migraine    MTHFR (methylene THF reductase) deficiency and homocystinuria (HCC)    OTHER DISEASES    mthfr clotting disorder    Other malaise and fatigue    Weight gain              Past Surgical History:   Procedure Laterality Date    Back surgery            Laser surgery of eye  2006    corneal LASIK    Oral surgery procedure  20yo    wisdom teeth extraction    Spine surgery procedure unlisted      Tubal ligation                  Social History     Socioeconomic History    Marital status:     Number of children: 3   Tobacco Use    Smoking status: Never    Smokeless tobacco: Never   Vaping Use    Vaping status: Never Used   Substance and Sexual Activity    Alcohol use: No    Drug use: No    Sexual activity: Yes     Partners: Male   Other Topics Concern    Caffeine Concern No    Exercise Yes   Social History Narrative    ** Merged History Encounter **                        Physical Exam     ED Triage Vitals   BP 02/19/25 0605 136/84   Pulse 02/19/25 0605 93   Resp 02/19/25 0605 20   Temp 02/19/25 0605 98.5 °F (36.9 °C)   Temp src 02/19/25 0605 Temporal   SpO2 02/19/25 0605 99 %   O2 Device 02/19/25 0604 None (Room air)       Current Vitals:   Vital Signs  BP: 136/84  Pulse: 93  Resp: 20  Temp: 98.5 °F (36.9 °C)  Temp src: Temporal    Oxygen Therapy  SpO2: 99 %  O2 Device: None (Room air)        Physical Exam  HEENT : NCAT, EOMI, PEERL,  neck supple, no JVD, trachea midline, No LAD  Heart: S1S2 normal. No murmurs, regular rate and rhythm  Lungs: Clear to auscultation bilaterally  Abdomen: Soft epigastric region is tender nondistended normal active bowel sounds without rebound, guarding or masses noted  Back nontender without CVA tenderness  Extremity no clubbing, cyanosis or edema noted.  Full range of motion noted without tenderness  Neuro: No focal deficits noted    All measures to prevent infection transmission during my interaction with the patient were taken.  The patient was already wearing droplet mask on my arrival to the room.  Personal protective equipment including a droplet mask as well as gloves were worn throughout the duration of my exam.  Hand washing was performed prior to and after the exam.  Stethoscope and equipment used during my examination was cleaned with a super Sani cloth germicidal wipe following the exam.    ED Course     Labs Reviewed   COMP METABOLIC PANEL (14) - Abnormal; Notable for the following components:       Result Value    Glucose 162 (*)     Sodium 134 (*)     Bilirubin, Total 1.5 (*)     All other components within normal limits   CBC WITH DIFFERENTIAL WITH PLATELET - Abnormal; Notable for the following components:    WBC 12.1 (*)     HGB 16.8 (*)     Neutrophil Absolute Prelim 10.90 (*)     Neutrophil Absolute 10.90 (*)     Lymphocyte Absolute 0.43 (*)     All other components within normal limits   URINALYSIS, ROUTINE -  Abnormal; Notable for the following components:    Ketones Urine 15 (*)     Blood Urine Trace-Intact (*)     pH Urine 8.5 (*)     Protein Urine 30 mg/dL (*)     All other components within normal limits   UA MICROSCOPIC ONLY, URINE - Abnormal; Notable for the following components:    RBC Urine 3-5 (*)     Bacteria Urine Rare (*)     Squamous Epi. Cells Few (*)     All other components within normal limits   LIPASE - Normal   POCT PREGNANCY URINE - Normal       ED Course as of 02/19/25 0839  ------------------------------------------------------------  Time: 02/19 0838  Comment: While the patient's pregnancy test is negative.  The urine shows signs of dehydration but no infection.  Her lipase was 27.  White count was 12.1 thousand.  The hemoglobin was 16.8.  The electrolytes were relatively unremarkable.  She was given 2 L of IV fluid and Zofran and Toradol.  She is feeling better and tolerating orals well at this time.       Medications   sodium chloride 0.9 % IV bolus 1,000 mL (1,000 mL Intravenous New Bag 2/19/25 0741)   sodium chloride 0.9 % IV bolus 1,000 mL (0 mL Intravenous Stopped 2/19/25 0741)   ketorolac (Toradol) 15 MG/ML injection 15 mg (15 mg Intravenous Given 2/19/25 0636)   ondansetron (Zofran) 4 MG/2ML injection 4 mg (4 mg Intravenous Given 2/19/25 0636)            MDM      Differential diagnosis included electrolyte abnormality, gastroenteritis, intractable vomiting but not limited such.  Patient presented with dehydration and gastroenteritis.  She is feeling better after the above and tolerating orals well and be discharged home with outpatient management.      Patient was screened and evaluated during this visit.   As a treating physician attending to the patient, I determined, within reasonable clinical confidence and prior to discharge, that an emergency medical condition was not or was no longer present.  There was no indication for further evaluation, treatment or admission on an emergency  basis.       The usual and customary discharge instuctions were discussed given the patient's ER course.  We discussed signs and symptoms that should prompt the patient's immediate return to the emergency department.   Reasonable over the counter and prescription treatment options and Physician follow up plan was discussed.       The patient is discharged in good condition.       This note was prepared using Dragon Medical voice recognition dictation software.  As a result errors may occur.  When identified to these areas have been corrected.  While every attempt is made to correct errors during dictation discrepancies may still exist.  Please contact if there are any errors.        Medical Decision Making      Disposition and Plan     Clinical Impression:  1. Gastroenteritis    2. Dehydration         Disposition:  Discharge  2/19/2025  8:39 am    Follow-up:  Feroz Steven MD  [address] [place] [address]  [phone]    Schedule an appointment as soon as possible for a visit in 2 day(s)            Medications Prescribed:  Current Discharge Medication List        START taking these medications    Details   ondansetron 4 MG Oral Tablet Dispersible Take 1 tablet (4 mg total) by mouth every 4 (four) hours as needed for Nausea.  Qty: 10 tablet, Refills: 0      dicyclomine 20 MG Oral Tab Take 1 tablet (20 mg total) by mouth 4 (four) times daily as needed.  Qty: 30 tablet, Refills: 0                 Supplementary Documentation:

## 2025-02-20 ENCOUNTER — PATIENT OUTREACH (OUTPATIENT)
Dept: CASE MANAGEMENT | Age: 50
End: 2025-02-20

## 2025-02-20 NOTE — PROGRESS NOTES
Patient had recent Emergency Room visit, calling to offer Primary Care Physician follow-up appointment (discharged 02/19)    Dr Feroz Steven  Internal Medicine, IP Consult to Primary Care  [facility] [place] [address]  [phone]    Attempt #1:  Left message on voicemail for patient to call transitions specialist back to schedule follow up appointments. Provided Transitions specialist scheduling phone number [phone].

## 2025-02-21 NOTE — PROGRESS NOTES
ED Hospital Follow up for PCP (Discharge 2/19 edw)       PCP  Feroz Steven  Internal Medicine, IP Consult to Primary Care  [address] [address]  [phone]  unable to contact pt after multiple attempts    Attempt #2:  Left message on voicemail for patient to call transitions specialist back to schedule follow up appointments. Provided Transitions specialist scheduling phone number [phone]. Closing encounter. Will re-open if patient returns call.

## 2025-03-05 NOTE — PROGRESS NOTES
Arely Harris is a 49 year old female.    Chief Complaint   Patient presents with    Follow - Up     Room 11, AS, follow up visit.       HPI:   Here for ER follow up   seen in ER 2/19 with N/V.  Intractable which prompted ER visit.   Labs reviewed.  Leukocytosis.  Abnormal UA but no WBC.  Since ER visit she has been not feeling well.   Increased stomach cramping.  No diarrhea.  No blood in stool.  Cramping is Lower abdomen.  No fever or chills.     vomited again Monday - Tuesday which is 2 weeks since ER visit.   Mild leukocytosis in ER given fluids for dehydration.    Has not had colonoscopy  was schedued 3 times.  Cancelled by [facility] 1 time, her dad passed away the second time and most recently because of ER visit.    Urine pregnancy negative.    On wegovy with Dr Cortez but has not taken in over a month.        Patient Active Problem List   Diagnosis    Anxiety    Viral warts, unspecified    Migraine headache    Hypothyroidism (acquired)    Acne    MTHFR (methylene THF reductase) deficiency and homocystinuria (HCC)    Bell's palsy    Dyslipidemia    Adverse effect of glucocorticoid or synthetic analogue     Current Outpatient Medications   Medication Sig Dispense Refill    semaglutide-weight management 0.5 MG/0.5ML Subcutaneous Solution Auto-injector Inject 0.5 mL (0.5 mg total) into the skin once a week.      SUMAtriptan Succinate 100 MG Oral Tab TAKE 1 TABLET (100 MG TOTAL) BY MOUTH AS NEEDED. MAY REPEAT AFTER 2 HOURS IF NEEDED. MAX 2 TABLETS IN 24 HOURS 18 tablet 3    levothyroxine 175 MCG Oral Tab Take 1 tablet (175 mcg total) by mouth before breakfast. 90 tablet 0    Tretinoin 0.1 % External Cream Apply 1 Application topically nightly.      spironolactone 25 MG Oral Tab Take 1 tablet (25 mg total) by mouth one time. One daily with 50 mg to equal 75 mg      sertraline 50 MG Oral Tab Take 1 tablet (50 mg total) by mouth daily. 90 tablet 3    Azelaic Acid 15 % External Gel Apply 1 Application topically every  morning.      ibuprofen (MOTRIN) 400 MG Oral Tab Take 1 tablet (400 mg total) by mouth every 6 (six) hours as needed for Pain.      Multiple Vitamins-Minerals (MULTIVITAMIN & MINERAL OR) Take  by mouth daily.      dicyclomine 20 MG Oral Tab Take 1 tablet (20 mg total) by mouth 4 (four) times daily as needed. 30 tablet 0      Past Medical History:    Acne    Anxiety state, unspecified    Back pain    L4-5 and L5-S1 lami in 2011    Decorative tattoo    Flatulence/gas pain/belching    Headache disorder    High cholesterol    Hypothyroidism (acquired)    Migraine    MTHFR (methylene THF reductase) deficiency and homocystinuria (HCC)    OTHER DISEASES    mthfr clotting disorder    Other malaise and fatigue    Weight gain      Social History:  Social History     Socioeconomic History    Marital status:     Number of children: 3   Tobacco Use    Smoking status: Never    Smokeless tobacco: Never   Vaping Use    Vaping status: Never Used   Substance and Sexual Activity    Alcohol use: No    Drug use: No    Sexual activity: Yes     Partners: Male   Other Topics Concern    Caffeine Concern No    Exercise Yes   Social History Narrative    ** Merged History Encounter **          Family History   Problem Relation Age of Onset    Heart Attack Maternal Grandmother     Stroke Maternal Grandmother     Breast Cancer Maternal Grandmother 72    Hypertension Father     Heart Disorder Father     Prostate Cancer Father 78        Metastatic lung cancer    Cancer Father 78        LUNG    Other (CAD) Father     Cancer Paternal Grandfather         liver dx age 40-50's    Breast Cancer Paternal Aunt 60        post menopause 60-70 yrs old    Hypertension Mother     Asthma Son     No Known Problems Maternal Grandfather     No Known Problems Paternal Grandmother     No Known Problems Son     No Known Problems Son     Thyroid disease Sister         Hypothyroidism on thyroxine    Diabetes Neg         Allergies  Allergies[1]    REVIEW OF  SYSTEMS:   GENERAL HEALTH: not feeling well.    RESPIRATORY: denies shortness of breath with exertion, no cough  CARDIOVASCULAR: denies chest pain on exertion, no palpatations  GI: as above  MUSCULOSKELETAL:  No arthralgias or myalgias  NEURO: denies headaches,     EXAM:   /78 (BP Location: Right arm, Patient Position: Sitting, Cuff Size: adult)   Pulse 54   Ht 5' 7\" (1.702 m)   Wt 172 lb 3.2 oz (78.1 kg)   LMP 11/18/2024 (Approximate)   SpO2 95%   BMI 26.97 kg/m²   GENERAL: well developed, well nourished,in no apparent distress  LUNGS: normal rate without respiratory distress, lungs clear to auscultation  CARDIO: RRR without murmur  GI: normal bowel sounds, soft.  Generalized bilateral left > right lower quadrant pain with guarding.    EXTREMITIES: no edema, normal strength and tone  PSYCH: alert and oriented x 3    ASSESSMENT AND PLAN:     Encounter Diagnoses   Name Primary?    LLQ pain  stat CT abd/pelvis.  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  Remain NPO. Yes    Nausea and vomiting, unspecified vomiting type          No orders of the defined types were placed in this encounter.      Meds & Refills for this Visit:  Requested Prescriptions      No prescriptions requested or ordered in this encounter       Imaging & Consults:  CT ABDOMEN+PELVIS(CONTRAST ONLY)(CPT=74177)    No follow-ups on file.  There are no Patient Instructions on file for this visit.         [1]   Allergies  Allergen Reactions    Medrol [Methylprednisolone] OTHER (SEE COMMENTS)     Hormone crisis per patient

## 2025-03-06 ENCOUNTER — OFFICE VISIT (OUTPATIENT)
Dept: INTERNAL MEDICINE CLINIC | Facility: CLINIC | Age: 50
End: 2025-03-06
Payer: COMMERCIAL

## 2025-03-06 ENCOUNTER — TELEPHONE (OUTPATIENT)
Dept: INTERNAL MEDICINE CLINIC | Facility: CLINIC | Age: 50
End: 2025-03-06

## 2025-03-06 ENCOUNTER — HOSPITAL ENCOUNTER (OUTPATIENT)
Dept: CT IMAGING | Age: 50
Discharge: HOME | End: 2025-03-06
Attending: NURSE PRACTITIONER
Payer: COMMERCIAL

## 2025-03-06 VITALS
WEIGHT: 172.19 LBS | OXYGEN SATURATION: 95 % | BODY MASS INDEX: 27.02 KG/M2 | HEIGHT: 67 IN | DIASTOLIC BLOOD PRESSURE: 78 MMHG | HEART RATE: 54 BPM | SYSTOLIC BLOOD PRESSURE: 110 MMHG

## 2025-03-06 DIAGNOSIS — R11.2 NAUSEA AND VOMITING, UNSPECIFIED VOMITING TYPE: ICD-10-CM

## 2025-03-06 DIAGNOSIS — R10.32 LLQ PAIN: Primary | ICD-10-CM

## 2025-03-06 DIAGNOSIS — R10.32 LLQ PAIN: ICD-10-CM

## 2025-03-06 PROCEDURE — 74177 CT ABD & PELVIS W/CONTRAST: CPT | Performed by: NURSE PRACTITIONER

## 2025-03-06 PROCEDURE — 3008F BODY MASS INDEX DOCD: CPT | Performed by: NURSE PRACTITIONER

## 2025-03-06 PROCEDURE — 3078F DIAST BP <80 MM HG: CPT | Performed by: NURSE PRACTITIONER

## 2025-03-06 PROCEDURE — 3074F SYST BP LT 130 MM HG: CPT | Performed by: NURSE PRACTITIONER

## 2025-03-06 PROCEDURE — 99214 OFFICE O/P EST MOD 30 MIN: CPT | Performed by: NURSE PRACTITIONER

## 2025-03-06 PROCEDURE — G2211 COMPLEX E/M VISIT ADD ON: HCPCS | Performed by: NURSE PRACTITIONER

## 2025-03-06 RX ORDER — IOHEXOL 350 MG/ML
90 INJECTION, SOLUTION INTRAVENOUS
Status: COMPLETED | OUTPATIENT
Start: 2025-03-06 | End: 2025-03-06

## 2025-03-06 RX ADMIN — IOHEXOL 90 ML: 350 INJECTION, SOLUTION INTRAVENOUS at 15:07:00

## 2025-03-06 NOTE — TELEPHONE ENCOUNTER
Received call from Latoya in radiology with STAT CT results:    Impression   CONCLUSION:  No acute abnormality in the abdomen and pelvis.       Routing to Yoanna Barriga to update on results.

## 2025-03-11 ENCOUNTER — HOSPITAL ENCOUNTER (EMERGENCY)
Age: 50
Discharge: HOME | End: 2025-03-11
Attending: EMERGENCY MEDICINE
Payer: COMMERCIAL

## 2025-03-11 ENCOUNTER — APPOINTMENT (OUTPATIENT)
Dept: CT IMAGING | Age: 50
End: 2025-03-11
Attending: PHYSICIAN ASSISTANT
Payer: COMMERCIAL

## 2025-03-11 ENCOUNTER — APPOINTMENT (OUTPATIENT)
Dept: ULTRASOUND IMAGING | Age: 50
End: 2025-03-11
Attending: PHYSICIAN ASSISTANT
Payer: COMMERCIAL

## 2025-03-11 VITALS
RESPIRATION RATE: 16 BRPM | HEIGHT: 67 IN | SYSTOLIC BLOOD PRESSURE: 102 MMHG | TEMPERATURE: 97 F | WEIGHT: 170 LBS | HEART RATE: 76 BPM | BODY MASS INDEX: 26.68 KG/M2 | DIASTOLIC BLOOD PRESSURE: 69 MMHG | OXYGEN SATURATION: 97 %

## 2025-03-11 DIAGNOSIS — K52.9 ENTERITIS: Primary | ICD-10-CM

## 2025-03-11 LAB
ALBUMIN SERPL-MCNC: 4.9 G/DL (ref 3.2–4.8)
ALBUMIN/GLOB SERPL: 1.4 {RATIO} (ref 1–2)
ALP LIVER SERPL-CCNC: 100 U/L
ALT SERPL-CCNC: 39 U/L
ANION GAP SERPL CALC-SCNC: 11 MMOL/L (ref 0–18)
AST SERPL-CCNC: 27 U/L (ref ?–34)
B-HCG UR QL: NEGATIVE
BASOPHILS # BLD AUTO: 0.07 X10(3) UL (ref 0–0.2)
BASOPHILS NFR BLD AUTO: 0.5 %
BILIRUB SERPL-MCNC: 0.6 MG/DL (ref 0.3–1.2)
BILIRUB UR QL STRIP.AUTO: NEGATIVE
BUN BLD-MCNC: 13 MG/DL (ref 9–23)
CALCIUM BLD-MCNC: 10.6 MG/DL (ref 8.7–10.6)
CHLORIDE SERPL-SCNC: 101 MMOL/L (ref 98–112)
CLARITY UR REFRACT.AUTO: CLEAR
CO2 SERPL-SCNC: 23 MMOL/L (ref 21–32)
COLOR UR AUTO: YELLOW
CREAT BLD-MCNC: 0.97 MG/DL
EGFRCR SERPLBLD CKD-EPI 2021: 72 ML/MIN/1.73M2 (ref 60–?)
EOSINOPHIL # BLD AUTO: 0.01 X10(3) UL (ref 0–0.7)
EOSINOPHIL NFR BLD AUTO: 0.1 %
ERYTHROCYTE [DISTWIDTH] IN BLOOD BY AUTOMATED COUNT: 12.5 %
GLOBULIN PLAS-MCNC: 3.5 G/DL (ref 2–3.5)
GLUCOSE BLD-MCNC: 173 MG/DL (ref 70–99)
GLUCOSE UR STRIP.AUTO-MCNC: NEGATIVE MG/DL
HCT VFR BLD AUTO: 49 %
HGB BLD-MCNC: 17.5 G/DL
IMM GRANULOCYTES # BLD AUTO: 0.08 X10(3) UL (ref 0–1)
IMM GRANULOCYTES NFR BLD: 0.6 %
KETONES UR STRIP.AUTO-MCNC: 40 MG/DL
LEUKOCYTE ESTERASE UR QL STRIP.AUTO: NEGATIVE
LIPASE SERPL-CCNC: 40 U/L (ref 12–53)
LYMPHOCYTES # BLD AUTO: 2.37 X10(3) UL (ref 1–4)
LYMPHOCYTES NFR BLD AUTO: 16.5 %
MCH RBC QN AUTO: 32.7 PG (ref 26–34)
MCHC RBC AUTO-ENTMCNC: 35.7 G/DL (ref 31–37)
MCV RBC AUTO: 91.6 FL
MONOCYTES # BLD AUTO: 0.78 X10(3) UL (ref 0.1–1)
MONOCYTES NFR BLD AUTO: 5.4 %
NEUTROPHILS # BLD AUTO: 11.02 X10 (3) UL (ref 1.5–7.7)
NEUTROPHILS # BLD AUTO: 11.02 X10(3) UL (ref 1.5–7.7)
NEUTROPHILS NFR BLD AUTO: 76.9 %
NITRITE UR QL STRIP.AUTO: NEGATIVE
OSMOLALITY SERPL CALC.SUM OF ELEC: 284 MOSM/KG (ref 275–295)
PH UR STRIP.AUTO: 7 [PH] (ref 5–8)
PLATELET # BLD AUTO: 437 10(3)UL (ref 150–450)
POTASSIUM SERPL-SCNC: 3.3 MMOL/L (ref 3.5–5.1)
PROT SERPL-MCNC: 8.4 G/DL (ref 5.7–8.2)
RBC # BLD AUTO: 5.35 X10(6)UL
RBC #/AREA URNS AUTO: >10 /HPF
SODIUM SERPL-SCNC: 135 MMOL/L (ref 136–145)
SP GR UR STRIP.AUTO: 1.02 (ref 1–1.03)
UROBILINOGEN UR STRIP.AUTO-MCNC: 0.2 MG/DL
WBC # BLD AUTO: 14.3 X10(3) UL (ref 4–11)

## 2025-03-11 PROCEDURE — 80053 COMPREHEN METABOLIC PANEL: CPT

## 2025-03-11 PROCEDURE — 87272 CRYPTOSPORIDIUM AG IF: CPT | Performed by: PHYSICIAN ASSISTANT

## 2025-03-11 PROCEDURE — 87046 STOOL CULTR AEROBIC BACT EA: CPT | Performed by: PHYSICIAN ASSISTANT

## 2025-03-11 PROCEDURE — S0028 INJECTION, FAMOTIDINE, 20 MG: HCPCS | Performed by: PHYSICIAN ASSISTANT

## 2025-03-11 PROCEDURE — 83690 ASSAY OF LIPASE: CPT

## 2025-03-11 PROCEDURE — 85025 COMPLETE CBC W/AUTO DIFF WBC: CPT

## 2025-03-11 PROCEDURE — 87045 FECES CULTURE AEROBIC BACT: CPT | Performed by: PHYSICIAN ASSISTANT

## 2025-03-11 PROCEDURE — 93005 ELECTROCARDIOGRAM TRACING: CPT

## 2025-03-11 PROCEDURE — 99284 EMERGENCY DEPT VISIT MOD MDM: CPT

## 2025-03-11 PROCEDURE — 74177 CT ABD & PELVIS W/CONTRAST: CPT | Performed by: PHYSICIAN ASSISTANT

## 2025-03-11 PROCEDURE — 81001 URINALYSIS AUTO W/SCOPE: CPT | Performed by: EMERGENCY MEDICINE

## 2025-03-11 PROCEDURE — 87427 SHIGA-LIKE TOXIN AG IA: CPT | Performed by: PHYSICIAN ASSISTANT

## 2025-03-11 PROCEDURE — 87015 SPECIMEN INFECT AGNT CONCNTJ: CPT | Performed by: PHYSICIAN ASSISTANT

## 2025-03-11 PROCEDURE — 81015 MICROSCOPIC EXAM OF URINE: CPT | Performed by: EMERGENCY MEDICINE

## 2025-03-11 PROCEDURE — 96361 HYDRATE IV INFUSION ADD-ON: CPT

## 2025-03-11 PROCEDURE — 96374 THER/PROPH/DIAG INJ IV PUSH: CPT

## 2025-03-11 PROCEDURE — 93010 ELECTROCARDIOGRAM REPORT: CPT

## 2025-03-11 PROCEDURE — 87329 GIARDIA AG IA: CPT | Performed by: PHYSICIAN ASSISTANT

## 2025-03-11 PROCEDURE — 81025 URINE PREGNANCY TEST: CPT

## 2025-03-11 PROCEDURE — 76700 US EXAM ABDOM COMPLETE: CPT | Performed by: PHYSICIAN ASSISTANT

## 2025-03-11 PROCEDURE — 96375 TX/PRO/DX INJ NEW DRUG ADDON: CPT

## 2025-03-11 RX ORDER — KETOROLAC TROMETHAMINE 30 MG/ML
30 INJECTION, SOLUTION INTRAMUSCULAR; INTRAVENOUS ONCE
Status: COMPLETED | OUTPATIENT
Start: 2025-03-11 | End: 2025-03-11

## 2025-03-11 RX ORDER — LORAZEPAM 2 MG/ML
0.5 INJECTION INTRAMUSCULAR ONCE
Status: COMPLETED | OUTPATIENT
Start: 2025-03-11 | End: 2025-03-11

## 2025-03-11 RX ORDER — FAMOTIDINE 10 MG/ML
20 INJECTION, SOLUTION INTRAVENOUS ONCE
Status: COMPLETED | OUTPATIENT
Start: 2025-03-11 | End: 2025-03-11

## 2025-03-11 RX ORDER — ONDANSETRON 2 MG/ML
4 INJECTION INTRAMUSCULAR; INTRAVENOUS ONCE
Status: COMPLETED | OUTPATIENT
Start: 2025-03-11 | End: 2025-03-11

## 2025-03-11 RX ORDER — HYDROCODONE BITARTRATE AND ACETAMINOPHEN 5; 325 MG/1; MG/1
1-2 TABLET ORAL EVERY 6 HOURS PRN
Qty: 5 TABLET | Refills: 0 | Status: SHIPPED | OUTPATIENT
Start: 2025-03-11

## 2025-03-11 RX ORDER — ONDANSETRON 4 MG/1
4 TABLET, ORALLY DISINTEGRATING ORAL EVERY 4 HOURS PRN
Qty: 10 TABLET | Refills: 0 | Status: SHIPPED | OUTPATIENT
Start: 2025-03-11 | End: 2025-03-18

## 2025-03-11 NOTE — ED PROVIDER NOTES
Patient Seen in: [facility] [place]      History     Chief Complaint   Patient presents with    Abdominal Pain    Nausea/vomiting     Stated Complaint: abd pain and vomiting couple weeks    Subjective:   HPI    49-year-old female with past medical history of anxiety, hypothyroidism, hyperlipidemia, MTHFR mutation who presents to the ER for abdominal pain and vomiting ongoing for the past 2 or 3 weeks.  Patient reports that these episodes are intermittent and usually worse with eating.  Reports sharp and cramping epigastric abdominal pain, nonradiating and usually associated with nausea and vomiting.  Reports this episode today is similar to prior but now she is experiencing loose stools, 2 episodes so far today without any blood.  Denies any associated fever, urinary symptoms, chest pain, shortness of breath or any other complaints.  Denies any recreational drug use or alcohol.  Reports prior C-sections but no other abdominal surgeries.    Objective:     Past Medical History:    Acne    Anxiety state, unspecified    Back pain    L4-5 and L5-S1 lami in     Decorative tattoo    Flatulence/gas pain/belching    Headache disorder    High cholesterol    Hypothyroidism (acquired)    Migraine    MTHFR (methylene THF reductase) deficiency and homocystinuria (HCC)    OTHER DISEASES    mthfr clotting disorder    Other malaise and fatigue    Weight gain              Past Surgical History:   Procedure Laterality Date    Back surgery            Laser surgery of eye  2006    corneal LASIK    Oral surgery procedure  18yo    wisdom teeth extraction    Spine surgery procedure unlisted      Tubal ligation                  Social History     Socioeconomic History    Marital status:     Number of children: 3   Tobacco Use    Smoking status: Never    Smokeless tobacco: Never   Vaping Use    Vaping status: Never Used   Substance and Sexual Activity    Alcohol use: No    Drug use: No     Sexual activity: Yes     Partners: Male   Other Topics Concern    Caffeine Concern No    Exercise Yes   Social History Narrative    ** Merged History Encounter **                       Physical Exam     ED Triage Vitals   BP 03/11/25 1156 (!) 139/91   Pulse 03/11/25 1156 91   Resp 03/11/25 1156 18   Temp 03/11/25 1224 96.5 °F (35.8 °C)   Temp src 03/11/25 1224 Temporal   SpO2 03/11/25 1156 98 %   O2 Device 03/11/25 1156 None (Room air)       Current Vitals:   Vital Signs  BP: 102/69  Pulse: 76  Resp: 16  Temp: 96.5 °F (35.8 °C)  Temp src: Temporal    Oxygen Therapy  SpO2: 97 %  O2 Device: None (Room air)        Physical Exam  GENERAL APPEARANCE:  AxOx4, appears uncomfortable, vomiting.  HEENT:  NC, AT. MMM. EOMI, clear conjunctiva, oropharynx clear.  NECK:  Supple without lymphadenopathy.  No stiffness or restricted ROM.  HEART:  Normal rate and regular rhythm, normal S1/S1, no m/r/g  LUNGS:  CTAB, moving air well. No crackles or wheezes are heard.  ABDOMEN:  Soft, epigastric tenderness, nondistended with good bowel sounds heard.  BACK: No CVAT, no obvious deformity.  EXTREMITIES:  Without cyanosis, clubbing or edema.  NEUROLOGICAL:  Grossly nonfocal. Alert and oriented, moving all 4 extremities. CN not formally tested but appear grossly intact. Observed to ambulate with normal gait.  Skin:  Warm and dry without any rash.        ED Course     Labs Reviewed   COMP METABOLIC PANEL (14) - Abnormal; Notable for the following components:       Result Value    Glucose 173 (*)     Sodium 135 (*)     Potassium 3.3 (*)     Total Protein 8.4 (*)     Albumin 4.9 (*)     All other components within normal limits   CBC WITH DIFFERENTIAL WITH PLATELET - Abnormal; Notable for the following components:    WBC 14.3 (*)     RBC 5.35 (*)     HGB 17.5 (*)     HCT 49.0 (*)     Neutrophil Absolute Prelim 11.02 (*)     Neutrophil Absolute 11.02 (*)     All other components within normal limits   URINALYSIS, ROUTINE - Abnormal; Notable for  the following components:    Ketones Urine 40 (*)     Blood Urine Moderate (*)     Protein Urine 30 mg/dL (*)     All other components within normal limits   UA MICROSCOPIC ONLY, URINE - Abnormal; Notable for the following components:    RBC Urine >10 (*)     Bacteria Urine 1+ (*)     Squamous Epi. Cells Few (*)     All other components within normal limits   LIPASE - Normal   POCT PREGNANCY URINE - Normal   STOOL CULTURE W/SHIGATOXIN    Narrative:     The following orders were created for panel order Stool Culture W/Shigatoxin.  Procedure                               Abnormality         Status                     ---------                               -----------         ------                     Stool Culture[[id number]]                                                               Shigatoxin[[id number]]                                                                    Please view results for these tests on the individual orders.   GIARDIA + CRYPTO ANTIGEN, STOOL   STOOL CULTURE(P)   SHIGATOXIN            CT ABDOMEN+PELVIS(CONTRAST ONLY)(CPT=74177)    Result Date: 3/11/2025  PROCEDURE:  CT ABDOMEN+PELVIS (CONTRAST ONLY) (CPT=74177)  COMPARISON:  [facility], CT, CT ABDOMEN+PELVIS(CONTRAST ONLY)(CPT=74177), 3/06/2025, 3:07 PM.  INDICATIONS:  abd pain and vomiting couple weeks  TECHNIQUE:  CT scanning was performed from the dome of the diaphragm to the pubic symphysis with non-ionic intravenous contrast material. Post contrast coronal MPR imaging was performed.  Dose reduction techniques were used. Dose information is transmitted to the ACR (American College of Radiology) NRDR (National Radiology Data Registry) which includes the Dose Index Registry.  PATIENT STATED HISTORY:(As transcribed by Technologist)  Patient c/o epigastric pain and N/V for 2 weeks. Patient reports diarrhea started today.   CONTRAST USED:  100cc of Isovue 370  FINDINGS:  LIVER:  No enlargement, atrophy, abnormal density, or  significant focal lesion.  BILIARY:  No visible dilatation or calcification.  PANCREAS:  No lesion, fluid collection, ductal dilatation, or atrophy.  SPLEEN:  No enlargement or focal lesion.  KIDNEYS:  No mass, obstruction, or calcification.  ADRENALS:  No mass or enlargement.  AORTA/VASCULAR:  No aneurysm or dissection.  RETROPERITONEUM:  No mass or adenopathy.  BOWEL/MESENTERY:  There is a small hiatal hernia.  There is diffuse mild fluid-filled distention of the colon and distal small bowel.  The appendix is normal.  There is no evidence of mechanical obstruction.  Findings could represent enteritis or malabsorption. ABDOMINAL WALL:  No mass or hernia.  URINARY BLADDER:  No visible focal wall thickening, lesion, or calculus.  PELVIC NODES:  No adenopathy.  PELVIC ORGANS:  No visible mass.  Pelvic organs appropriate for patient age.  BONES:  There is postoperative change from partial laminectomies at L4-L5 and L5-S1.  There is diffuse degenerative disc disease in the spine. LUNG BASES:  No visible pulmonary or pleural disease.  OTHER:  Negative.             CONCLUSION:  1. Diffuse fluid-filled mild distention of colon and distal small bowel without transition point would be consistent with enteritis or malabsorption.  There is no free air, abscess or free fluid. 2. There is a small hiatal hernia.   LOCATION:  [place]   Dictated by (CST): Westley Estevez MD on 3/11/2025 at 2:22 PM     Finalized by (CST): Westley Estevez MD on 3/11/2025 at 2:28 PM       US ABDOMEN COMPLETE (CPT=76700)    Result Date: 3/11/2025  PROCEDURE:  US ABDOMEN COMPLETE (CPT=76700)  COMPARISON:  [facility], CT, CT ABDOMEN+PELVIS(CONTRAST ONLY)(CPT=74177), 3/06/2025, 3:07 PM.  INDICATIONS:  abd pain and vomiting couple weeks, eval gallbladder  TECHNIQUE:  Real time gray-scale ultrasound was used to evaluate the abdomen.  The exam includes images of the liver, gallbladder, common bile duct, pancreas, spleen, kidneys, IVC, and aorta.   PATIENT STATED HISTORY: (As transcribed by Technologist)  Patient with history of epigastric pain and vomiting.    FINDINGS:  LIVER:  Normal size and echogenicity. No significant masses. BILIARY:  Normal appearing gallbladder, intrahepatic ducts, and common bile duct.  Common bile duct diameter is 3 mm. PANCREAS:  3.3 x 2.2 x 2.5 cm well-circumscribed fluid collection is noted near the pancreatic head.  This could potentially represent a duodenal diverticulum.  Additional evaluation with contrast-enhanced CT is recommended.  There is no pancreatic duct distension detected. SPLEEN:  Normal. KIDNEYS:  Normal.  Right kidney measures 10.5 cm.  Left kidney measures 10.4 cm. AORTA/IVC:  Normal.             CONCLUSION:  1. Possible 3.3 cm fluid collection near pancreatic head versus duodenal diverticulum.  Additional evaluation with CT is recommended. 2. Otherwise unremarkable ultrasound of abdomen.    LOCATION:  [place]    Dictated by (CST): Westley Estevez MD on 3/11/2025 at 1:17 PM     Finalized by (CST): Westley Estevez MD on 3/11/2025 at 1:19 PM       CT ABDOMEN+PELVIS(CONTRAST ONLY)(CPT=74177)    Result Date: 3/6/2025  PROCEDURE:  CT ABDOMEN+PELVIS (CONTRAST ONLY) (CPT=74177)  COMPARISON:  [facility], CT, CT ABD(C/S) / PELVIS(C/S) -SET, 6/11/2009, 10:48 AM.  INDICATIONS:  R11.2 Nausea and vomiting, unspecified vomiting type R10.32 LLQ pain  TECHNIQUE:  CT scanning was performed from the dome of the diaphragm to the pubic symphysis with non-ionic intravenous contrast material. Post contrast coronal MPR imaging was performed.  Dose reduction techniques were used. Dose information is transmitted to the ACR (American College of Radiology) NRDR (National Radiology Data Registry) which includes the Dose Index Registry.  PATIENT STATED HISTORY:(As transcribed by Technologist)  Patient states she has had epigastric and LLQ pain with nausea and vomiting for the past 2 weeks   CONTRAST USED:  90cc of Omnipaque  350  FINDINGS:  LIVER:  No enlargement, atrophy, abnormal density, or significant focal lesion.  BILIARY:  No visible dilatation or calcification.  PANCREAS:  No lesion, fluid collection, ductal dilatation, or atrophy.  SPLEEN:  No enlargement or focal lesion.  KIDNEYS:  No mass, obstruction, or calcification.  ADRENALS:  No mass or enlargement.  AORTA/VASCULAR:  No aneurysm or dissection.  RETROPERITONEUM:  No mass or adenopathy.  BOWEL/MESENTERY:  No visible mass, obstruction, or bowel wall thickening.  Appendix is normal. ABDOMINAL WALL:  No mass or hernia.  URINARY BLADDER:  No visible focal wall thickening, lesion, or calculus.  PELVIC NODES:  No adenopathy.  PELVIC ORGANS:  No visible mass.  Pelvic organs appropriate for patient age.  BONES:  No bony lesion or fracture.  LUNG BASES:  No visible pulmonary or pleural disease.  OTHER:  Negative.             CONCLUSION:  No acute abnormality in the abdomen and pelvis.   LOCATION:  [facility]   Dictated by (CST): Alvarado Banda MD on 3/06/2025 at 3:32 PM     Finalized by (CST): Alvarado Banda MD on 3/06/2025 at 3:35 PM            EKG: EKG sinus bradycardia with rate of 57 with an otherwise normal axis and intervals.  QTc was evaluated and was 453, will hold off on any additional Zofran here.         MDM    49-year-old female who presents to the ER for epigastric abdominal pain associate with vomiting and now loose stools.  Vitals WNL.  Differential diagnosis includes but does not exclude gastroenteritis versus cholelithiasis versus gastritis versus pancreatitis.  Patient had a normal CT abdomen pelvis completed on 3/6 by her primary care doctor.  CBC with slight hemoconcentration consistent with possible dehydration and vomiting.  CMP with glucose of 173, discussed with patient and need for follow-up.  Sodium and potassium very slightly low, discussed electrolytes at home.  UA shows blood, patient is currently on..  Lipase is normal.  Ultrasound shows fluid collection  near pancreas.  CT abdomen pelvis shows signs of enteritis but no other acute findings noted.  Discussed results with patient, if she is feeling much better with nausea and pain medications given here.  Given that her symptoms have been ongoing for several weeks, plan for follow-up with gastroenterology and PCP.  Discussed continued supportive management at home in the meantime and return precautions.  Ready for discharge.        Medical Decision Making      Disposition and Plan     Clinical Impression:  1. Enteritis         Disposition:  Discharge  3/11/2025  3:01 pm    [facility]  [facility] [place] [facility]  [address] [address] [address]  Follow up            Medications Prescribed:  Discharge Medication List as of 3/11/2025  3:07 PM        START taking these medications    Details   HYDROcodone-acetaminophen 5-325 MG Oral Tab Take 1-2 tablets by mouth every 6 (six) hours as needed for Pain., Normal, Disp-5 tablet, R-0                 Supplementary Documentation:

## 2025-03-11 NOTE — ED INITIAL ASSESSMENT (HPI)
50 y/o F arrives to ED with c/o epigastric pain and N/V for 2 weeks. Patient reports diarrhea started today. Patient denies any known sick contact or travel.

## 2025-03-11 NOTE — DISCHARGE INSTRUCTIONS
Obtain stool samples and bring to outpatient lab.  Make an appointment for follow-up with your PCP and gastroenterology.  Take Tylenol for pain as needed.  You have been prescribed a small amount of hydrocodone, use with caution as it can make you drowsy.  You have been prescribed a narcotic medication for acute pain control. Use with caution as it can cause drowsiness, do not drive or operate heavy machinery with this medication and do not take along with any other sedating medications. This medication can cause constipation, supplement with daily Miralax while taking if needed.   Take Zofran as directed for nausea or vomiting.  Eat bland, soft diet for the next several days and advance as tolerated.  Return to the ER for any other new or worsening symptoms.

## 2025-03-12 LAB
ATRIAL RATE: 57 BPM
CRYPTOSP AG STL QL IA: NEGATIVE
G LAMBLIA AG STL QL IA: NEGATIVE
P AXIS: 57 DEGREES
P-R INTERVAL: 152 MS
Q-T INTERVAL: 466 MS
QRS DURATION: 84 MS
QTC CALCULATION (BEZET): 453 MS
R AXIS: 51 DEGREES
T AXIS: 64 DEGREES
VENTRICULAR RATE: 57 BPM

## 2025-03-13 ENCOUNTER — PATIENT OUTREACH (OUTPATIENT)
Dept: CASE MANAGEMENT | Age: 50
End: 2025-03-13

## 2025-03-13 NOTE — PROGRESS NOTES
Patient had recent Emergency Room visit, calling to offer Primary Care Physician follow-up appointment (discharged 03/11)     Dr Feroz Steven  Internal Medicine, IP Consult to Primary Care  [facility] [place] [address]  [phone]    Attempt #1:  Left message on voicemail for patient to call transitions specialist back to schedule follow up appointments. Provided Transitions specialist scheduling phone number [phone].

## 2025-03-14 NOTE — PROGRESS NOTES
ED Hospital Follow up for PCP  (Discharge 3/11 edw)     PCP  Feroz Steven  Internal Medicine, IP Consult to Primary Care  [address] [address]  [phone]  unable to contact pt after multiple attempt   Attempt #2:  Left message on voicemail for patient to call transitions specialist back to schedule follow up appointments. Provided Transitions specialist scheduling phone number [phone]. Closing encounter. Will re-open if patient returns call.

## 2025-03-26 ENCOUNTER — PATIENT MESSAGE (OUTPATIENT)
Dept: INTERNAL MEDICINE CLINIC | Facility: CLINIC | Age: 50
End: 2025-03-26

## 2025-03-26 DIAGNOSIS — E03.9 HYPOTHYROIDISM (ACQUIRED): ICD-10-CM

## 2025-03-26 DIAGNOSIS — L70.0 ACNE VULGARIS: Primary | ICD-10-CM

## 2025-03-26 DIAGNOSIS — K62.9: ICD-10-CM

## 2025-03-26 PROBLEM — R63.4 LOSS OF WEIGHT: Status: ACTIVE | Noted: 2025-03-26

## 2025-03-26 PROBLEM — R11.2 NAUSEA WITH VOMITING: Status: ACTIVE | Noted: 2025-03-26

## 2025-03-26 PROBLEM — D12.5 BENIGN NEOPLASM OF SIGMOID COLON: Status: ACTIVE | Noted: 2025-03-26

## 2025-03-26 PROCEDURE — 88305 TISSUE EXAM BY PATHOLOGIST: CPT | Performed by: STUDENT IN AN ORGANIZED HEALTH CARE EDUCATION/TRAINING PROGRAM

## 2025-03-27 NOTE — TELEPHONE ENCOUNTER
She needs to decide who she will be seeing for dermatology.  Dr Bennett or Dr Ng.  She is not able to see both.    Also, Dr Ng is [place] and likley no longer part of her [facility] group.

## 2025-04-02 ENCOUNTER — HOSPITAL ENCOUNTER (OUTPATIENT)
Dept: CT IMAGING | Facility: HOSPITAL | Age: 50
Discharge: HOME | End: 2025-04-02
Attending: STUDENT IN AN ORGANIZED HEALTH CARE EDUCATION/TRAINING PROGRAM
Payer: COMMERCIAL

## 2025-04-02 DIAGNOSIS — R63.4 WEIGHT LOSS, UNINTENTIONAL: ICD-10-CM

## 2025-04-02 PROCEDURE — 74177 CT ABD & PELVIS W/CONTRAST: CPT | Performed by: STUDENT IN AN ORGANIZED HEALTH CARE EDUCATION/TRAINING PROGRAM

## 2025-05-06 DIAGNOSIS — Z13.0 SCREENING FOR BLOOD DISEASE: Primary | ICD-10-CM

## 2025-05-06 DIAGNOSIS — Z13.1 SCREENING FOR DIABETES MELLITUS: ICD-10-CM

## 2025-05-06 DIAGNOSIS — Z13.220 SCREENING, LIPID: ICD-10-CM

## 2025-05-06 DIAGNOSIS — E03.9 HYPOTHYROIDISM (ACQUIRED): ICD-10-CM

## 2025-05-07 RX ORDER — LEVOTHYROXINE SODIUM 175 UG/1
175 TABLET ORAL
Qty: 90 TABLET | Refills: 1 | Status: SHIPPED | OUTPATIENT
Start: 2025-05-07

## 2025-05-12 ENCOUNTER — HOSPITAL ENCOUNTER (OUTPATIENT)
Dept: NUCLEAR MEDICINE | Facility: HOSPITAL | Age: 50
Discharge: HOME | End: 2025-05-12
Attending: NURSE PRACTITIONER
Payer: COMMERCIAL

## 2025-05-12 DIAGNOSIS — R11.2 NAUSEA AND VOMITING, UNSPECIFIED VOMITING TYPE: ICD-10-CM

## 2025-05-12 PROCEDURE — 78227 HEPATOBIL SYST IMAGE W/DRUG: CPT | Performed by: NURSE PRACTITIONER

## 2025-05-21 ENCOUNTER — TELEPHONE (OUTPATIENT)
Dept: INTERNAL MEDICINE CLINIC | Facility: CLINIC | Age: 50
End: 2025-05-21

## 2025-05-21 DIAGNOSIS — Z13.0 SCREENING FOR DISORDER OF BLOOD AND BLOOD-FORMING ORGANS: ICD-10-CM

## 2025-05-21 DIAGNOSIS — Z13.29 SCREENING FOR THYROID DISORDER: ICD-10-CM

## 2025-05-21 DIAGNOSIS — Z13.220 SCREENING FOR LIPID DISORDERS: ICD-10-CM

## 2025-05-21 DIAGNOSIS — Z00.00 ROUTINE GENERAL MEDICAL EXAMINATION AT A HEALTH CARE FACILITY: Primary | ICD-10-CM

## 2025-05-21 DIAGNOSIS — Z13.228 SCREENING FOR METABOLIC DISORDER: ICD-10-CM

## 2025-05-21 NOTE — TELEPHONE ENCOUNTER
Patient called request labs prior to their annual physical.  Annual physical scheduled for   Future Appointments   Date Time Provider Department Center   8/6/2025  7:30 AM Brigitte Barriga APRN [facility]       Please order labs. Patient preferred lab is [facility]  Patient informed request was sent to clinical team.  Patient informed to fast for labs.  No callback required.

## 2025-06-26 ENCOUNTER — OFFICE VISIT (OUTPATIENT)
Dept: INTERNAL MEDICINE CLINIC | Facility: CLINIC | Age: 50
End: 2025-06-26
Payer: COMMERCIAL

## 2025-06-26 VITALS
WEIGHT: 176 LBS | DIASTOLIC BLOOD PRESSURE: 78 MMHG | HEART RATE: 89 BPM | BODY MASS INDEX: 27.62 KG/M2 | SYSTOLIC BLOOD PRESSURE: 120 MMHG | HEIGHT: 67 IN | RESPIRATION RATE: 18 BRPM

## 2025-06-26 DIAGNOSIS — E03.9 HYPOTHYROIDISM (ACQUIRED): ICD-10-CM

## 2025-06-26 DIAGNOSIS — E78.5 DYSLIPIDEMIA: ICD-10-CM

## 2025-06-26 DIAGNOSIS — Z51.81 ENCOUNTER FOR THERAPEUTIC DRUG MONITORING: Primary | ICD-10-CM

## 2025-06-26 DIAGNOSIS — E66.3 OVERWEIGHT (BMI 25.0-29.9): ICD-10-CM

## 2025-06-26 PROBLEM — G51.0 BELL'S PALSY: Status: RESOLVED | Noted: 2020-04-13 | Resolved: 2025-06-26

## 2025-06-26 PROCEDURE — 3074F SYST BP LT 130 MM HG: CPT | Performed by: INTERNAL MEDICINE

## 2025-06-26 PROCEDURE — 3078F DIAST BP <80 MM HG: CPT | Performed by: INTERNAL MEDICINE

## 2025-06-26 PROCEDURE — 99214 OFFICE O/P EST MOD 30 MIN: CPT | Performed by: INTERNAL MEDICINE

## 2025-06-26 PROCEDURE — 3008F BODY MASS INDEX DOCD: CPT | Performed by: INTERNAL MEDICINE

## 2025-06-26 RX ORDER — PANCRELIPASE LIPASE, PANCRELIPASE PROTEASE, PANCRELIPASE AMYLASE 252600; 60000; 189600 [USP'U]/1; [USP'U]/1; [USP'U]/1
1 CAPSULE, DELAYED RELEASE ORAL DAILY
Qty: 30 CAPSULE | Refills: 0 | COMMUNITY
Start: 2025-06-26

## 2025-06-26 RX ORDER — PANCRELIPASE LIPASE, PANCRELIPASE PROTEASE, PANCRELIPASE AMYLASE 252600; 60000; 189600 [USP'U]/1; [USP'U]/1; [USP'U]/1
1 CAPSULE, DELAYED RELEASE ORAL
Qty: 540 CAPSULE | Refills: 1 | Status: SHIPPED | OUTPATIENT
Start: 2025-06-26

## 2025-06-26 RX ORDER — ONDANSETRON 4 MG/1
4 TABLET, ORALLY DISINTEGRATING ORAL EVERY 8 HOURS PRN
COMMUNITY
Start: 2025-05-24

## 2025-06-26 RX ORDER — SPIRONOLACTONE 100 MG/1
100 TABLET, FILM COATED ORAL DAILY
COMMUNITY
Start: 2025-06-13

## 2025-06-26 RX ORDER — TIRZEPATIDE 2.5 MG/.5ML
2.5 INJECTION, SOLUTION SUBCUTANEOUS WEEKLY
Qty: 2 ML | Refills: 0 | Status: SHIPPED | OUTPATIENT
Start: 2025-06-26

## 2025-06-26 NOTE — PROGRESS NOTES
The following individual(s) verbally consented to be recorded using ambient AI listening technology and understand that they can each withdraw their consent to this listening technology at any point by asking the clinician to turn off or pause the recording:    Patient name: Arely Harris  Additional names:

## 2025-06-26 NOTE — PROGRESS NOTES
HISTORY OF PRESENT ILLNESS  Chief Complaint   Patient presents with    Weight Check     Down 1        Arely Harris is a 49 year old female here for follow up in medical weight loss program.   [facility] Follow Up    General Information  Success Moment: N/a  Challenging Moment: Possible Intolerance to GLP-1  Nutrition Recall  Breakfast: Dry cheerios Lunch: Veggie sandwich   Dinner: Chicken and rice Snacks: None   Fluids: Water coffee diet soda Dining Out: 3   Exercise   Patient stated exercises # days/week: 5  Patient stated perceived level of   exertion: 3 Anaerobic Days: 1   Aerobic Days: 4   Patient stated average level of stress: 5  Sleep   Patient stated # hours uninterrupted sleep: 6   Patient stated feels   restful: Yes      Goals: Review options              History of Present Illness  Arely Harris is a 49 year old female who presents with gastrointestinal side effects after discontinuing Wegovy.    In February 2025, she began experiencing intractable nausea, leading to multiple emergency room visits. Despite extensive imaging, no definitive cause was identified. Since discontinuing Wegovy, she has not experienced vomiting for the past two months.    Initially, she lost 15 pounds due to nausea and lack of appetite but has since regained the weight after her appetite returned. She reports no current issues with diarrhea or constipation and only occasional nausea without bloating.    Her current medication regimen does not include Wegovy, and she has not been on any other weight management medications since discontinuing it.    Wt Readings from Last 6 Encounters:   06/26/25 176 lb (79.8 kg)   05/01/25 172 lb (78 kg)   03/18/25 170 lb (77.1 kg)   03/11/25 170 lb (77.1 kg)   03/06/25 172 lb 3.2 oz (78.1 kg)   02/19/25 165 lb (74.8 kg)              Breakfast Lunch Dinner Snacks Fluids   Reviewed           REVIEW OF SYSTEMS  GENERAL HEALTH: feels well otherwise, denied any fevers chills or night sweats    RESPIRATORY: denies shortness of breath   CARDIOVASCULAR: denies chest pain  GI: denies abdominal pain    EXAM  /78   Pulse 89   Resp 18   Ht 5' 7\" (1.702 m)   Wt 176 lb (79.8 kg)   LMP 03/10/2025 (Approximate)   BMI 27.57 kg/m²   GENERAL: well developed, well nourished,in no apparent distress, A/O x3  SKIN: no rashes,no suspicious lesions  HEENT: atraumatic, normocephalic, OP-clear, PERRL  NECK: supple,no adenopathy  LUNGS: clear to auscultation bilaterally   CARDIO: RRR without murmur  GI: good BS's,NT/ND, no masses or HSM  EXTREMITIES: no cyanosis, no clubbing, no edema    Lab Results   Component Value Date    WBC 14.3 (H) 03/11/2025    RBC 5.35 (H) 03/11/2025    HGB 17.5 (H) 03/11/2025    HCT 49.0 (H) 03/11/2025    MCV 91.6 03/11/2025    MCH 32.7 03/11/2025    MCHC 35.7 03/11/2025    RDW 12.5 03/11/2025    .0 03/11/2025    MPV 10.5 (H) 10/22/2011     Lab Results   Component Value Date     (H) 03/11/2025    BUN 13 03/11/2025    BUNCREA 16.3 03/27/2021    CREATSERUM 0.97 03/11/2025    ANIONGAP 11 03/11/2025    GFR 85 12/12/2017    GFRNAA 82 03/27/2021    GFRAA 95 03/27/2021    CA 10.6 03/11/2025    OSMOCALC 284 03/11/2025    ALKPHO 100 03/11/2025    AST 27 03/11/2025    ALT 39 03/11/2025    BILT 0.6 03/11/2025    TP 8.4 (H) 03/11/2025    ALB 4.9 (H) 03/11/2025    GLOBULIN 3.5 03/11/2025    AGRATIO 1.5 01/12/2016     (L) 03/11/2025    K 3.3 (L) 03/11/2025     03/11/2025    CO2 23.0 03/11/2025     Lab Results   Component Value Date     02/24/2023    A1C 5.4 02/24/2023     Lab Results   Component Value Date    CHOLEST 213 (H) 05/27/2023    TRIG 101 05/27/2023    HDL 52 05/27/2023     (H) 05/27/2023    VLDL 19 05/27/2023    TCHDLRATIO 2.64 04/10/2018    NONHDLC 161 (H) 05/27/2023     Lab Results   Component Value Date    T4F 1.5 05/17/2024    TSH 0.340 (L) 05/17/2024    TSHT4 0.58 12/15/2015     Lab Results   Component Value Date    B12 586 03/27/2021     Lab  Results   Component Value Date    VITD 26.3 (L) 03/27/2021       Medications Ordered Prior to Encounter[1]    ASSESSMENT  Analyzed weight data:     [facility] Information  Patient type: Non-Surgical   Initial Body Fat %: 38.1      Date of Initial Weight: 10/17/19 Initial Weight: 196        Have any comorbidities improved since the last visit?   Hypertension DM 2 Dyslipidemia Osteoarthritis Sleep Apnea                    Diagnoses and all orders for this visit:    Encounter for therapeutic drug monitoring  -     Tirzepatide-Weight Management (ZEPBOUND) 2.5 MG/0.5ML Subcutaneous Solution Auto-injector; Inject 2.5 mg into the skin once a week.    Overweight (BMI 25.0-29.9)  -     Tirzepatide-Weight Management (ZEPBOUND) 2.5 MG/0.5ML Subcutaneous Solution Auto-injector; Inject 2.5 mg into the skin once a week.    Dyslipidemia  -     Tirzepatide-Weight Management (ZEPBOUND) 2.5 MG/0.5ML Subcutaneous Solution Auto-injector; Inject 2.5 mg into the skin once a week.    Hypothyroidism (acquired)  -     Tirzepatide-Weight Management (ZEPBOUND) 2.5 MG/0.5ML Subcutaneous Solution Auto-injector; Inject 2.5 mg into the skin once a week.    Other orders  -     Pancrelipase, Lip-Prot-Amyl, (ZENPEP) [id number] units Oral Cap DR Particles; Take 1 capsule by mouth 3 (three) times daily before meals. Mobile          [phone]  -     Pancrelipase, Lip-Prot-Amyl, (ZENPEP) [id number] units Oral Cap DR Particles; Take 1 tablet by mouth daily.        PLAN  Assessment & Plan  Overweight (BMI 25.0-29.9)  Total time spent on chart review, pre-charting, obtaining history, counseling, and educating, reviewing labs was 30 minutes.  Discontinued Wegovy due to nausea and vomiting. Considering  Zepbound for weight management. Zepbound preferred due to potential for fewer gastrointestinal side effects. Zenpep may help mitigate these effects.  -Monitor for any GI side effects  - Initiate Zepbound 2.5 mg weekly injection.  - Provide Zenpep samples  for pretreatment.  - Send prescription to [facility].  - Consider mail order pharmacy [facility] if insurance issues arise.  - Continue 2.5 mg dose if losing weight without side effects.  - Increase to 5 mg dose if not losing weight and no side effects.  - Educated on injection technique and site selection.  - Discussed potential use of savings card for copay reduction.    There are no Patient Instructions on file for this visit.    No follow-ups on file.    Patient verbalizes understanding.    Valerie Cortez MD           [1]   Current Outpatient Medications on File Prior to Visit   Medication Sig Dispense Refill    ondansetron 4 MG Oral Tablet Dispersible Take 1 tablet (4 mg total) by mouth every 8 (eight) hours as needed for Nausea.      spironolactone 100 MG Oral Tab Take 1 tablet (100 mg total) by mouth daily.      levothyroxine 175 MCG Oral Tab Take 1 tablet (175 mcg total) by mouth before breakfast. 90 tablet 1    ondansetron (ZOFRAN) 4 mg tablet Take 1 tablet (4 mg total) by mouth every 12 (twelve) hours as needed for Nausea. 20 tablet 0    SUMAtriptan Succinate 100 MG Oral Tab TAKE 1 TABLET (100 MG TOTAL) BY MOUTH AS NEEDED. MAY REPEAT AFTER 2 HOURS IF NEEDED. MAX 2 TABLETS IN 24 HOURS 18 tablet 3    Tretinoin 0.1 % External Cream Apply 1 Application topically nightly.      sertraline 50 MG Oral Tab Take 1 tablet (50 mg total) by mouth daily. 90 tablet 3    Azelaic Acid 15 % External Gel Apply 1 Application topically every morning.      Multiple Vitamins-Minerals (MULTIVITAMIN & MINERAL OR) Take  by mouth daily.       No current facility-administered medications on file prior to visit.

## 2025-07-10 ENCOUNTER — HOSPITAL ENCOUNTER (OUTPATIENT)
Dept: NUCLEAR MEDICINE | Facility: HOSPITAL | Age: 50
Discharge: HOME | End: 2025-07-10
Attending: NURSE PRACTITIONER
Payer: COMMERCIAL

## 2025-07-10 ENCOUNTER — LAB ENCOUNTER (OUTPATIENT)
Dept: LAB | Facility: HOSPITAL | Age: 50
End: 2025-07-10
Attending: NURSE PRACTITIONER
Payer: COMMERCIAL

## 2025-07-10 DIAGNOSIS — Z13.29 SCREENING FOR THYROID DISORDER: ICD-10-CM

## 2025-07-10 DIAGNOSIS — Z13.228 SCREENING FOR METABOLIC DISORDER: ICD-10-CM

## 2025-07-10 DIAGNOSIS — E03.9 HYPOTHYROIDISM (ACQUIRED): ICD-10-CM

## 2025-07-10 DIAGNOSIS — Z13.220 SCREENING, LIPID: ICD-10-CM

## 2025-07-10 DIAGNOSIS — E78.5 DYSLIPIDEMIA: ICD-10-CM

## 2025-07-10 DIAGNOSIS — Z13.0 SCREENING FOR BLOOD DISEASE: ICD-10-CM

## 2025-07-10 DIAGNOSIS — Z13.1 SCREENING FOR DIABETES MELLITUS: ICD-10-CM

## 2025-07-10 DIAGNOSIS — Z13.220 SCREENING FOR LIPID DISORDERS: ICD-10-CM

## 2025-07-10 DIAGNOSIS — Z13.0 SCREENING FOR DISORDER OF BLOOD AND BLOOD-FORMING ORGANS: ICD-10-CM

## 2025-07-10 DIAGNOSIS — Z00.00 ROUTINE GENERAL MEDICAL EXAMINATION AT A HEALTH CARE FACILITY: ICD-10-CM

## 2025-07-10 DIAGNOSIS — R11.2 NAUSEA AND VOMITING, UNSPECIFIED VOMITING TYPE: ICD-10-CM

## 2025-07-10 LAB
ALBUMIN SERPL-MCNC: 4.4 G/DL (ref 3.2–4.8)
ALBUMIN/GLOB SERPL: 1.6 {RATIO} (ref 1–2)
ALP LIVER SERPL-CCNC: 71 U/L (ref 39–100)
ALT SERPL-CCNC: 18 U/L (ref 10–49)
ANION GAP SERPL CALC-SCNC: <0 MMOL/L (ref 0–18)
AST SERPL-CCNC: 20 U/L (ref ?–34)
BASOPHILS # BLD AUTO: 0.06 X10(3) UL (ref 0–0.2)
BASOPHILS NFR BLD AUTO: 0.7 %
BILIRUB SERPL-MCNC: 0.5 MG/DL (ref 0.3–1.2)
BUN BLD-MCNC: 10 MG/DL (ref 9–23)
CALCIUM BLD-MCNC: 10.2 MG/DL (ref 8.7–10.6)
CHLORIDE SERPL-SCNC: 105 MMOL/L (ref 98–112)
CHOLEST SERPL-MCNC: 237 MG/DL (ref ?–200)
CO2 SERPL-SCNC: 35 MMOL/L (ref 21–32)
CREAT BLD-MCNC: 0.81 MG/DL (ref 0.55–1.02)
EGFRCR SERPLBLD CKD-EPI 2021: 89 ML/MIN/1.73M2 (ref 60–?)
EOSINOPHIL # BLD AUTO: 0.12 X10(3) UL (ref 0–0.7)
EOSINOPHIL NFR BLD AUTO: 1.4 %
ERYTHROCYTE [DISTWIDTH] IN BLOOD BY AUTOMATED COUNT: 11.9 %
FASTING PATIENT LIPID ANSWER: YES
FASTING STATUS PATIENT QL REPORTED: YES
GLOBULIN PLAS-MCNC: 2.7 G/DL (ref 2–3.5)
GLUCOSE BLD-MCNC: 95 MG/DL (ref 70–99)
HCT VFR BLD AUTO: 42.7 % (ref 35–48)
HDLC SERPL-MCNC: 55 MG/DL (ref 40–59)
HGB BLD-MCNC: 15.1 G/DL (ref 12–16)
IMM GRANULOCYTES # BLD AUTO: 0.03 X10(3) UL (ref 0–1)
IMM GRANULOCYTES NFR BLD: 0.4 %
LDLC SERPL CALC-MCNC: 160 MG/DL (ref ?–100)
LYMPHOCYTES # BLD AUTO: 2.69 X10(3) UL (ref 1–4)
LYMPHOCYTES NFR BLD AUTO: 32.3 %
MCH RBC QN AUTO: 31.6 PG (ref 26–34)
MCHC RBC AUTO-ENTMCNC: 35.4 G/DL (ref 31–37)
MCV RBC AUTO: 89.3 FL (ref 80–100)
MONOCYTES # BLD AUTO: 0.61 X10(3) UL (ref 0.1–1)
MONOCYTES NFR BLD AUTO: 7.3 %
NEUTROPHILS # BLD AUTO: 4.83 X10 (3) UL (ref 1.5–7.7)
NEUTROPHILS # BLD AUTO: 4.83 X10(3) UL (ref 1.5–7.7)
NEUTROPHILS NFR BLD AUTO: 57.9 %
NONHDLC SERPL-MCNC: 182 MG/DL (ref ?–130)
OSMOLALITY SERPL CALC.SUM OF ELEC: 287 MOSM/KG (ref 275–295)
PLATELET # BLD AUTO: 318 10(3)UL (ref 150–450)
POTASSIUM SERPL-SCNC: 4.6 MMOL/L (ref 3.5–5.1)
PROT SERPL-MCNC: 7.1 G/DL (ref 5.7–8.2)
RBC # BLD AUTO: 4.78 X10(6)UL (ref 3.8–5.3)
SODIUM SERPL-SCNC: 139 MMOL/L (ref 136–145)
T4 FREE SERPL-MCNC: 1.7 NG/DL (ref 0.8–1.7)
TRIGL SERPL-MCNC: 121 MG/DL (ref 30–149)
TSI SER-ACNC: 0.28 UIU/ML (ref 0.55–4.78)
VLDLC SERPL CALC-MCNC: 24 MG/DL (ref 0–30)
WBC # BLD AUTO: 8.3 X10(3) UL (ref 4–11)

## 2025-07-10 PROCEDURE — 84443 ASSAY THYROID STIM HORMONE: CPT

## 2025-07-10 PROCEDURE — 85025 COMPLETE CBC W/AUTO DIFF WBC: CPT

## 2025-07-10 PROCEDURE — 78264 GASTRIC EMPTYING IMG STUDY: CPT | Performed by: NURSE PRACTITIONER

## 2025-07-10 PROCEDURE — 80061 LIPID PANEL: CPT

## 2025-07-10 PROCEDURE — 36415 COLL VENOUS BLD VENIPUNCTURE: CPT

## 2025-07-10 PROCEDURE — 80053 COMPREHEN METABOLIC PANEL: CPT

## 2025-07-10 PROCEDURE — 84439 ASSAY OF FREE THYROXINE: CPT

## 2025-07-27 DIAGNOSIS — Z51.81 ENCOUNTER FOR THERAPEUTIC DRUG MONITORING: ICD-10-CM

## 2025-07-27 DIAGNOSIS — E03.9 HYPOTHYROIDISM (ACQUIRED): ICD-10-CM

## 2025-07-27 DIAGNOSIS — E78.5 DYSLIPIDEMIA: ICD-10-CM

## 2025-07-27 DIAGNOSIS — E66.3 OVERWEIGHT (BMI 25.0-29.9): ICD-10-CM

## 2025-07-28 RX ORDER — TIRZEPATIDE 5 MG/.5ML
5 INJECTION, SOLUTION SUBCUTANEOUS WEEKLY
Qty: 2 ML | Refills: 0 | Status: SHIPPED | OUTPATIENT
Start: 2025-07-28 | End: 2025-08-19

## 2025-07-28 NOTE — TELEPHONE ENCOUNTER
Requesting   Requested Prescriptions     Pending Prescriptions Disp Refills    Tirzepatide-Weight Management (ZEPBOUND) 5 MG/0.5ML Subcutaneous Solution Auto-injector [Pharmacy Med Name: ZEPBOUND 2.5 MG/0.5 ML PEN] 2 mL 0     Sig: Inject 5 mg into the skin once a week for 4 doses.      LOV: 06/26/25  RTC: 09/17/25  Filled: 06/26/25 #2 with 0 refills    Future Appointments   Date Time Provider Department Center   8/19/2025  7:30 AM Brigitte Barriga APRN [facility]   9/12/2025  3:00 PM Roxana Rivas DO [id number] [place]   9/17/2025 12:20 PM Valerie Cortez MD [facility]   12/17/2025 12:20 PM Valerie Cortez MD [facility]

## 2025-07-31 DIAGNOSIS — Z51.81 ENCOUNTER FOR THERAPEUTIC DRUG MONITORING: ICD-10-CM

## 2025-07-31 DIAGNOSIS — E03.9 HYPOTHYROIDISM (ACQUIRED): ICD-10-CM

## 2025-07-31 DIAGNOSIS — E78.5 DYSLIPIDEMIA: ICD-10-CM

## 2025-07-31 DIAGNOSIS — E66.3 OVERWEIGHT (BMI 25.0-29.9): ICD-10-CM

## 2025-08-04 RX ORDER — TIRZEPATIDE 5 MG/.5ML
5 INJECTION, SOLUTION SUBCUTANEOUS WEEKLY
Qty: 2 ML | Refills: 0 | OUTPATIENT
Start: 2025-08-04 | End: 2025-08-26

## 2025-08-19 ENCOUNTER — OFFICE VISIT (OUTPATIENT)
Dept: INTERNAL MEDICINE CLINIC | Facility: CLINIC | Age: 50
End: 2025-08-19

## 2025-08-19 VITALS
DIASTOLIC BLOOD PRESSURE: 70 MMHG | OXYGEN SATURATION: 98 % | SYSTOLIC BLOOD PRESSURE: 104 MMHG | WEIGHT: 168.38 LBS | HEART RATE: 60 BPM | TEMPERATURE: 97 F | HEIGHT: 67 IN | RESPIRATION RATE: 16 BRPM | BODY MASS INDEX: 26.43 KG/M2

## 2025-08-19 DIAGNOSIS — Z12.31 ENCOUNTER FOR SCREENING MAMMOGRAM FOR MALIGNANT NEOPLASM OF BREAST: ICD-10-CM

## 2025-08-19 DIAGNOSIS — F41.9 ANXIETY: ICD-10-CM

## 2025-08-19 DIAGNOSIS — E72.12 MTHFR (METHYLENE THF REDUCTASE) DEFICIENCY AND HOMOCYSTINURIA (HCC): ICD-10-CM

## 2025-08-19 DIAGNOSIS — Z00.00 ROUTINE GENERAL MEDICAL EXAMINATION AT A HEALTH CARE FACILITY: Primary | ICD-10-CM

## 2025-08-19 DIAGNOSIS — E03.9 HYPOTHYROIDISM (ACQUIRED): ICD-10-CM

## 2025-08-19 DIAGNOSIS — E72.11 MTHFR (METHYLENE THF REDUCTASE) DEFICIENCY AND HOMOCYSTINURIA (HCC): ICD-10-CM

## 2025-08-19 DIAGNOSIS — E78.5 DYSLIPIDEMIA: ICD-10-CM

## 2025-08-19 DIAGNOSIS — G43.009 MIGRAINE WITHOUT AURA AND WITHOUT STATUS MIGRAINOSUS, NOT INTRACTABLE: ICD-10-CM

## 2025-08-19 DIAGNOSIS — M25.552 LEFT HIP PAIN: ICD-10-CM

## 2025-08-19 PROBLEM — R11.2 NAUSEA WITH VOMITING: Status: RESOLVED | Noted: 2025-03-26 | Resolved: 2025-08-19

## 2025-08-19 PROBLEM — R63.4 LOSS OF WEIGHT: Status: RESOLVED | Noted: 2025-03-26 | Resolved: 2025-08-19

## 2025-08-19 RX ORDER — ROSUVASTATIN CALCIUM 5 MG/1
5 TABLET, COATED ORAL NIGHTLY
Qty: 90 TABLET | Refills: 1 | Status: SHIPPED | OUTPATIENT
Start: 2025-08-19

## (undated) NOTE — LETTER
Date & Time: 11/14/2023, 8:22 AM  Patient: Parag Care  Encounter Provider(s):    JÚNIOR Smith       To Whom It May Concern:    Eduar Abernathy was seen and treated in our department on 11/14/2023. She can return to work 11/15/2023.     If you have any questions or concerns, please do not hesitate to call.        _____________________________   Physician/APC Signature

## (undated) NOTE — MR AVS SNAPSHOT
[facility] [address] [phone]               Thank you for choosing us for your health care visit with WAN Ma.   We are glad to serve you and happy to provide you with this summar BP Pulse Temp Height Weight BMI    114/68 mmHg 72 98.3 °F (36.8 °C) (Oral) 67\" 177 lb 9.6 oz 27.81 kg/m2         Current Medications          This list is accurate as of: 6/23/17 10:09 AM.  Always use your most recent med list.                [id number] You can access your MyChart to more actively manage your health care and view more details from this visit by going to [email or web address].   If you've recently had a stay at the Hospital you can access your discharge instructions in [address] by jenn

## (undated) NOTE — LETTER
05/29/20        [address]  [place] [address]      Dear Gabriela Prado records indicate that you have outstanding lab work and or testing that was ordered for you and has not yet been completed:  Orders Placed This Encounter

## (undated) NOTE — LETTER
[address] [address] [address]  [place] [phone]     Patient: Mirella Yates   YOB: 1975   Date of Visit: 11/9/2020     Dear Employer,        November 12, 2020    At [facility], we are ta Persons infected with SARS-CoV-2 who never develop COVID-19 symptoms may discontinue isolation and other precautions 10 days after the date of their first positive RT-PCR test for SARS-CoV-2 RNA.     Persons who are asymptomatic but have been exposed, CDC r

## (undated) NOTE — LETTER
03/15/21        [address]  [place] [address]      Dear Mariella Kingfisher records indicate that you have outstanding lab work and or testing that was ordered for you and has not yet been completed:  Orders Placed This Encounter

## (undated) NOTE — LETTER
Date & Time: 2/12/2024, 1:18 PM  Patient: Arely Harris  Encounter Provider(s):    Shaye Serra APRN       To Whom It May Concern:    Arely Harris was seen and treated in our department on 2/12/2024. She should not return to work until 2/15/24 and should wear a mask for the next 5 days .    If you have any questions or concerns, please do not hesitate to call.        _____________________________  Physician/APC Signature

## (undated) NOTE — LETTER
Date & Time: 2/19/2025, 9:03 AM  Patient: Arely Harris  Encounter Provider(s):    Ranjit Anderson MD       To Whom It May Concern:    Arely Harris was seen and treated in our department on 2/19/2025.   If you have any questions or concerns, please do not hesitate to call.        _____________________________  Physician/APC Signature

## (undated) NOTE — LETTER
Date & Time: 9/19/2022, 5:12 PM  Patient: Zainab Lundy  Encounter Provider(s):    Eveline Kidd MD       To Whom It May Concern:    Francisco Russo was seen and treated in our department on 9/19/2022. She may return on Wednesday 9/21/2022.     If you have any questions or concerns, please do not hesitate to call.        _____________________________  Physician/APC Signature

## (undated) NOTE — LETTER
[facility]  [address] [facility] [address]  [phone]     Patient: Arely Harris   YOB: 1975   Date of Visit: 2/12/2024     Dear Employer,        February 12, 2024    At [facility], we are taking special precautions and doing everything we can to prevent the spread of COVID-19. During this time, we ask for your assistance regarding physician documentation for employees to return to work following a respiratory illness.     The Centers for Disease Control (CDC) recommends the following:    Ensure that sick leave policies are flexible and consistent with public health guidance, and employees are aware of and understand these policies.   Maintain flexible policies that permit employees to stay home to care for a sick family member or to take care of children due to school and  closures.   Employers should not require a COVID-19 test result or a healthcare provider’s note for sick employees to validate their illness, qualify for sick leave or return to work.   Be aware that healthcare provider offices and medical facilities may be extremely busy and not able to provide documentation in a timely manner. Most people with COVID-19 have mild illness, can recover at home without medical care and can follow CDC recommendations to determine when to discontinue home isolation and return to work.      Individuals with COVID-19 symptoms directed to care for themselves at home should:  Isolate for five days. If individuals are asymptomatic or symptoms are resolving (without fever for 24 hours), isolation may be discontinued on day six. Onset of symptoms is considered day zero.   Follow isolation by five days of mask wearing when around others to minimize the risk of infection.     Individuals infected with SARS-CoV-2 who never develop COVID-19 symptoms:    Day of positive test is considered day zero.   Isolate for five days.  Can discontinue isolation on day  six.   Follow isolation by five days of wearing a mask when around others to minimize the risk of infection.    Individuals who are asymptomatic but have been exposed:  For people who are unvaccinated or are more than six months out from their second mRNA dose (or more than two months after the J&J vaccine) and not yet boosted, CDC now recommends quarantine for five days followed by strict, mask use for an additional five days. Alternatively, if a five-day quarantine is not feasible, it is imperative that an exposed person wear a well-fitting mask at all times when around others for 10 days after exposure.   Individuals who have received their booster shot do not need to quarantine following an exposure but should wear a mask for 10 days after the exposure.    Best practice would also include a test on day five after exposure.   If symptoms occur, individuals should immediately quarantine until a negative test confirms symptoms are not attributable to COVID-19.     Please visit the CDC website for further information and details to assist you during this challenging time.     Sincerely,     No att. providers found

## (undated) NOTE — LETTER
Date & Time: 11/12/2020, 12:50 PM  Patient: Delaney Garcia  Encounter Provider(s):    JÚNIOR Banks       To Whom It May Concern:    Katherine Duncan was seen and treated in our department on 11/9/2020.  She tested POSITIVE for COVID 1

## (undated) NOTE — LETTER
[address] [place] [address] [address] [phone]     Patient: Elvin Victor   YOB: 1975   Date of Visit: 11/9/2020     Dear Employer,        November 9, 2020    At [facility], we are bola ? At least 10 days (20 days for those with severe illness requiring hospitalization) have passed since symptom onset and  ? At least 24 hours have passed since resolution of fever without the use of fever-reducing medications and  ?  Other symptoms have imp

## (undated) NOTE — ED AVS SNAPSHOT
THE [facility] in [facility] [address] [address]    Phone:  [phone]    Fax:  [phone]   MRN: [id number]    Department:  THE [facility] in [facility]   Date of Visit:  4/6/2017           [facility], HOW IT WORKS (ENGLISH)      Disclosure     Insurance plans vary and the physician(s) referred by the Immediate Care may not be covered by your plan. Please contact your insurance company to determine coverage for follow-up care and referrals.     Carmella If you have been prescribed any medication(s), please fill your prescription right away and begin taking the medication(s) as directed.     If the Immediate Care Provider has read X-rays, these will be re-interpreted by a radiologist.  If there is a signifi can help with your Affordable Care Act coverage, as well as all types of Medicaid plans. To get signed up and covered, please call [phone] and ask to get set up for an insurance coverage that is in-network with [facility] Arte[facility]